# Patient Record
Sex: FEMALE | Race: WHITE | NOT HISPANIC OR LATINO | Employment: FULL TIME | URBAN - METROPOLITAN AREA
[De-identification: names, ages, dates, MRNs, and addresses within clinical notes are randomized per-mention and may not be internally consistent; named-entity substitution may affect disease eponyms.]

---

## 2018-10-18 ENCOUNTER — OFFICE VISIT (OUTPATIENT)
Dept: URGENT CARE | Facility: CLINIC | Age: 68
End: 2018-10-18
Payer: MEDICARE

## 2018-10-18 ENCOUNTER — APPOINTMENT (OUTPATIENT)
Dept: RADIOLOGY | Facility: CLINIC | Age: 68
End: 2018-10-18
Payer: MEDICARE

## 2018-10-18 VITALS
WEIGHT: 125 LBS | HEIGHT: 63 IN | RESPIRATION RATE: 20 BRPM | BODY MASS INDEX: 22.15 KG/M2 | HEART RATE: 74 BPM | SYSTOLIC BLOOD PRESSURE: 120 MMHG | DIASTOLIC BLOOD PRESSURE: 70 MMHG | TEMPERATURE: 98.7 F | OXYGEN SATURATION: 98 %

## 2018-10-18 DIAGNOSIS — R07.81 RIB PAIN: ICD-10-CM

## 2018-10-18 DIAGNOSIS — R07.81 RIB PAIN: Primary | ICD-10-CM

## 2018-10-18 PROCEDURE — 99203 OFFICE O/P NEW LOW 30 MIN: CPT | Performed by: PHYSICIAN ASSISTANT

## 2018-10-18 PROCEDURE — G0463 HOSPITAL OUTPT CLINIC VISIT: HCPCS | Performed by: PHYSICIAN ASSISTANT

## 2018-10-18 PROCEDURE — 71101 X-RAY EXAM UNILAT RIBS/CHEST: CPT

## 2018-10-18 RX ORDER — IBUPROFEN 600 MG/1
600 TABLET ORAL EVERY 8 HOURS SCHEDULED
Qty: 21 TABLET | Refills: 0 | Status: SHIPPED | OUTPATIENT
Start: 2018-10-18 | End: 2019-07-02

## 2018-10-18 RX ORDER — MULTIVITAMIN
TABLET ORAL DAILY
COMMUNITY

## 2018-10-18 NOTE — PROGRESS NOTES
[address] [facility]        NAME: Gianfranco Silvestre is a 77 y o  female  : 1950    MRN: [id number]  DATE: 2018  TIME: 11:05 AM    Assessment and Plan   Rib pain [R07 81]  1  Rib pain  XR ribs right w pa chest min 3 views    ibuprofen (MOTRIN) 600 mg tablet         Patient Instructions     Xray- no rib fracture  Ibuprofen OTC, take three tablets three times a day for 5-7 days  Ice 20min every 3-4 hours  Follow up with PCP in 3-5 days  Proceed to  ER if symptoms worsen  Chief Complaint     Chief Complaint   Patient presents with    Fall     fell on step yesterday and hurt thoracic area of back         History of Present Illness       77 y o  Female presents with right sided rib pain x 1 day  Pt states it is painful to take a deep breath in  She fell on her back deck down a step landing on her back  Did not hit her head  Denies loss of consciousness  Denies chest pain  Or SOB  Review of Systems   Review of Systems   Constitutional: Negative for chills, fatigue and fever  HENT: Negative for congestion, ear pain, sinus pain, sore throat and trouble swallowing  Eyes: Negative for pain, discharge and redness  Respiratory: Negative for cough, chest tightness, shortness of breath and wheezing  Cardiovascular: Negative for chest pain, palpitations and leg swelling  Gastrointestinal: Negative for abdominal pain, diarrhea, nausea and vomiting  Musculoskeletal: Negative for arthralgias, joint swelling and myalgias  Rib pain   Skin: Negative for rash  Neurological: Negative for dizziness, weakness, numbness and headaches           Current Medications       Current Outpatient Prescriptions:     ibuprofen (MOTRIN) 600 mg tablet, Take 1 tablet (600 mg total) by mouth every 8 (eight) hours for 7 days, Disp: 21 tablet, Rfl: 0    Multiple Vitamin (MULTI-VITAMIN DAILY) TABS, Take by mouth daily, Disp: , Rfl:     Current Allergies     Allergies as of 10/18/2018    (No Known Allergies)            The following portions of the patient's history were reviewed and updated as appropriate: allergies, current medications, past family history, past medical history, past social history, past surgical history and problem list      History reviewed  No pertinent past medical history  History reviewed  No pertinent surgical history  No family history on file  Medications have been verified  Objective   /70   Pulse 74   Temp 98 7 °F (37 1 °C) (Tympanic)   Resp 20   Ht 5' 3" (1 6 m)   Wt 56 7 kg (125 lb)   SpO2 98%   BMI 22 14 kg/m²        Physical Exam     Physical Exam   Constitutional: She is oriented to person, place, and time  She appears well-developed and well-nourished  No distress  HENT:   Head: Normocephalic  Right Ear: External ear normal    Left Ear: External ear normal    Mouth/Throat: Oropharynx is clear and moist    Eyes: Pupils are equal, round, and reactive to light  Conjunctivae and EOM are normal    Neck: Normal range of motion  Neck supple  Cardiovascular: Normal rate, regular rhythm and normal heart sounds  No murmur heard  Pulmonary/Chest: Effort normal and breath sounds normal  No respiratory distress  She has no wheezes  Abdominal: Soft  Bowel sounds are normal  There is no tenderness  Musculoskeletal: Normal range of motion  Cervical back: Normal         Thoracic back: Normal         Lumbar back: Normal         Arms:  Lymphadenopathy:     She has no cervical adenopathy  Neurological: She is alert and oriented to person, place, and time  She has normal reflexes  Skin: Skin is warm and dry  Psychiatric: She has a normal mood and affect  Nursing note and vitals reviewed          Xray- no rib fracture or pneumothorax

## 2018-12-27 ENCOUNTER — OFFICE VISIT (OUTPATIENT)
Dept: URGENT CARE | Facility: CLINIC | Age: 68
End: 2018-12-27
Payer: MEDICARE

## 2018-12-27 VITALS
HEART RATE: 73 BPM | WEIGHT: 123 LBS | HEIGHT: 63 IN | SYSTOLIC BLOOD PRESSURE: 116 MMHG | OXYGEN SATURATION: 95 % | BODY MASS INDEX: 21.79 KG/M2 | RESPIRATION RATE: 20 BRPM | DIASTOLIC BLOOD PRESSURE: 70 MMHG | TEMPERATURE: 97.3 F

## 2018-12-27 DIAGNOSIS — J01.10 ACUTE FRONTAL SINUSITIS, RECURRENCE NOT SPECIFIED: Primary | ICD-10-CM

## 2018-12-27 PROCEDURE — G0463 HOSPITAL OUTPT CLINIC VISIT: HCPCS | Performed by: NURSE PRACTITIONER

## 2018-12-27 PROCEDURE — 99213 OFFICE O/P EST LOW 20 MIN: CPT | Performed by: NURSE PRACTITIONER

## 2018-12-27 RX ORDER — AMOXICILLIN 875 MG/1
875 TABLET, COATED ORAL 2 TIMES DAILY
Qty: 20 TABLET | Refills: 0 | Status: SHIPPED | OUTPATIENT
Start: 2018-12-27 | End: 2019-01-06

## 2018-12-27 NOTE — PROGRESS NOTES
[facility]        NAME: Sander Luis is a 77 y o  female  : 1950    MRN: [id number]  DATE: 2018  TIME: 5:24 PM    Assessment and Plan   Acute frontal sinusitis, recurrence not specified [J01 10]  1  Acute frontal sinusitis, recurrence not specified  amoxicillin (AMOXIL) 875 mg tablet         Patient Instructions     I have prescribed an antibiotic for the infection  Please take the antibiotic as prescribed and finish the entire prescription  I recommend that the patient takes an over the counter probiotic or eats yogurt with live cultures in it [place]) to keep good bacteria in the gut and help prevent diarrhea  Wash hands frequently to prevent the spread of infection  Can use over the counter cough and cold medications to help with symptoms  Ibuprofen and/or tylenol as needed for pain or fever  If not improving over the next 7-10 days, follow up with PCP  Follow up with PCP in 3-5 days  Proceed to  ER if symptoms worsen  Chief Complaint     Chief Complaint   Patient presents with    Swollen Glands     swollen gland near right ear with cough for 3 days         History of Present Illness       61-year-old female presents to urgent care with chief complaint of sinus pressure, sinus congestion, postnasal drainage and dry nonproductive cough which is worse at night for the past 3 days  Has used over-the-counter decongestants no relief noted in symptoms denies any fevers or chills  URI    This is a new problem  The current episode started in the past 7 days  The problem has been unchanged  There has been no fever  Associated symptoms include congestion, coughing, a plugged ear sensation, rhinorrhea and sinus pain  Pertinent negatives include no abdominal pain, chest pain, diarrhea, dysuria, ear pain, headaches, joint pain, joint swelling, nausea, neck pain, rash, sneezing, sore throat, swollen glands, vomiting or wheezing   She has tried decongestant for the symptoms  The treatment provided mild relief  Review of Systems   Review of Systems   Constitutional: Negative  HENT: Positive for congestion, postnasal drip, rhinorrhea, sinus pain and sinus pressure  Negative for dental problem, drooling, ear discharge, ear pain, facial swelling, hearing loss, mouth sores, nosebleeds, sneezing, sore throat, tinnitus, trouble swallowing and voice change  Eyes: Negative  Respiratory: Positive for cough  Negative for apnea, choking, chest tightness, shortness of breath, wheezing and stridor  Cardiovascular: Negative for chest pain, palpitations and leg swelling  Gastrointestinal: Negative  Negative for abdominal distention, abdominal pain, anal bleeding, blood in stool, constipation, diarrhea, nausea, rectal pain and vomiting  Endocrine: Negative  Genitourinary: Negative  Negative for dysuria  Musculoskeletal: Negative  Negative for joint pain and neck pain  Skin: Negative  Negative for rash  Allergic/Immunologic: Negative  Neurological: Negative  Negative for headaches  Hematological: Negative  Psychiatric/Behavioral: Negative  Current Medications       Current Outpatient Prescriptions:     Multiple Vitamin (MULTI-VITAMIN DAILY) TABS, Take by mouth daily, Disp: , Rfl:     amoxicillin (AMOXIL) 875 mg tablet, Take 1 tablet (875 mg total) by mouth 2 (two) times a day for 10 days, Disp: 20 tablet, Rfl: 0    ibuprofen (MOTRIN) 600 mg tablet, Take 1 tablet (600 mg total) by mouth every 8 (eight) hours for 7 days, Disp: 21 tablet, Rfl: 0    Current Allergies     Allergies as of 12/27/2018    (No Known Allergies)            The following portions of the patient's history were reviewed and updated as appropriate: allergies, current medications, past family history, past medical history, past social history, past surgical history and problem list      History reviewed  No pertinent past medical history  History reviewed   No pertinent surgical history  History reviewed  No pertinent family history  Medications have been verified  Objective   /70   Pulse 73   Temp (!) 97 3 °F (36 3 °C) (Tympanic)   Resp 20   Ht 5' 3" (1 6 m)   Wt 55 8 kg (123 lb)   SpO2 95%   BMI 21 79 kg/m²        Physical Exam     Physical Exam   Constitutional: She is oriented to person, place, and time  Vital signs are normal  She appears well-developed and well-nourished  HENT:   Head: Normocephalic and atraumatic  Right Ear: Hearing, tympanic membrane, external ear and ear canal normal    Left Ear: Hearing, tympanic membrane, external ear and ear canal normal    Nose: Rhinorrhea and sinus tenderness present  Right sinus exhibits frontal sinus tenderness  Left sinus exhibits frontal sinus tenderness  Mouth/Throat: Uvula is midline and mucous membranes are normal  Posterior oropharyngeal erythema present  Eyes: Pupils are equal, round, and reactive to light  Conjunctivae and EOM are normal    Neck: Trachea normal, normal range of motion and full passive range of motion without pain  Cardiovascular: Normal rate and regular rhythm  Pulmonary/Chest: Effort normal and breath sounds normal    Abdominal: Soft  Normal appearance  Musculoskeletal: Normal range of motion  Lymphadenopathy:     She has cervical adenopathy  Right cervical: Superficial cervical adenopathy present  Left cervical: Superficial cervical adenopathy present  Neurological: She is alert and oriented to person, place, and time  Nursing note and vitals reviewed

## 2019-07-02 ENCOUNTER — HOSPITAL ENCOUNTER (EMERGENCY)
Facility: HOSPITAL | Age: 69
Discharge: HOME | End: 2019-07-02
Attending: FAMILY MEDICINE | Admitting: FAMILY MEDICINE
Payer: MEDICARE

## 2019-07-02 VITALS
RESPIRATION RATE: 28 BRPM | WEIGHT: 123.02 LBS | DIASTOLIC BLOOD PRESSURE: 65 MMHG | HEART RATE: 73 BPM | BODY MASS INDEX: 21.79 KG/M2 | TEMPERATURE: 96.1 F | OXYGEN SATURATION: 96 % | SYSTOLIC BLOOD PRESSURE: 135 MMHG

## 2019-07-02 DIAGNOSIS — T78.40XA ALLERGIC REACTION, INITIAL ENCOUNTER: Primary | ICD-10-CM

## 2019-07-02 PROCEDURE — 99285 EMERGENCY DEPT VISIT HI MDM: CPT

## 2019-07-02 PROCEDURE — 96374 THER/PROPH/DIAG INJ IV PUSH: CPT

## 2019-07-02 PROCEDURE — 94760 N-INVAS EAR/PLS OXIMETRY 1: CPT

## 2019-07-02 PROCEDURE — 93005 ELECTROCARDIOGRAM TRACING: CPT

## 2019-07-02 PROCEDURE — 94640 AIRWAY INHALATION TREATMENT: CPT

## 2019-07-02 PROCEDURE — 96375 TX/PRO/DX INJ NEW DRUG ADDON: CPT

## 2019-07-02 PROCEDURE — 96361 HYDRATE IV INFUSION ADD-ON: CPT

## 2019-07-02 RX ORDER — METHYLPREDNISOLONE SODIUM SUCCINATE 125 MG/2ML
125 INJECTION, POWDER, LYOPHILIZED, FOR SOLUTION INTRAMUSCULAR; INTRAVENOUS ONCE
Status: COMPLETED | OUTPATIENT
Start: 2019-07-02 | End: 2019-07-02

## 2019-07-02 RX ORDER — ALBUTEROL SULFATE 2.5 MG/3ML
2.5 SOLUTION RESPIRATORY (INHALATION) ONCE
Status: COMPLETED | OUTPATIENT
Start: 2019-07-02 | End: 2019-07-02

## 2019-07-02 RX ORDER — DIPHENHYDRAMINE HYDROCHLORIDE 50 MG/ML
25 INJECTION INTRAMUSCULAR; INTRAVENOUS ONCE
Status: COMPLETED | OUTPATIENT
Start: 2019-07-02 | End: 2019-07-02

## 2019-07-02 RX ORDER — PREDNISONE 20 MG/1
20 TABLET ORAL DAILY
Qty: 5 TABLET | Refills: 0 | Status: SHIPPED | OUTPATIENT
Start: 2019-07-02 | End: 2019-07-07

## 2019-07-02 RX ORDER — DIPHENHYDRAMINE HCL 25 MG
25 TABLET ORAL AS NEEDED
COMMUNITY

## 2019-07-02 RX ADMIN — SODIUM CHLORIDE 1000 ML: 0.9 INJECTION, SOLUTION INTRAVENOUS at 21:29

## 2019-07-02 RX ADMIN — METHYLPREDNISOLONE SODIUM SUCCINATE 125 MG: 125 INJECTION, POWDER, FOR SOLUTION INTRAMUSCULAR; INTRAVENOUS at 21:34

## 2019-07-02 RX ADMIN — ALBUTEROL SULFATE 2.5 MG: 2.5 SOLUTION RESPIRATORY (INHALATION) at 21:32

## 2019-07-02 RX ADMIN — DIPHENHYDRAMINE HYDROCHLORIDE 25 MG: 50 INJECTION INTRAMUSCULAR; INTRAVENOUS at 21:31

## 2019-07-03 LAB
ATRIAL RATE: 57 BPM
P AXIS: 76 DEGREES
PR INTERVAL: 150 MS
QRS AXIS: 73 DEGREES
QRSD INTERVAL: 90 MS
QT INTERVAL: 482 MS
QTC INTERVAL: 469 MS
T WAVE AXIS: 67 DEGREES
VENTRICULAR RATE: 57 BPM

## 2019-07-03 PROCEDURE — 93010 ELECTROCARDIOGRAM REPORT: CPT | Performed by: INTERNAL MEDICINE

## 2019-07-03 NOTE — ED PROVIDER NOTES
History  Chief Complaint   Patient presents with    Chest Pain    Allergic Reaction    Dizziness       History provided by:  Patient   used: No    Allergic Reaction   Presenting symptoms: itching, rash and wheezing    Itching:     Location:  Full body    Severity:  Moderate    Onset quality:  Sudden    Duration:  1 hour    Timing:  Constant    Progression:  Improving  Severity:  Mild  Duration:  1 hour  Prior allergic episodes:  No prior episodes  Context: insect bite/sting    Relieved by: Antihistamines  Worsened by:  Nothing  Ineffective treatments:  Antihistamines      Prior to Admission Medications   Prescriptions Last Dose Informant Patient Reported? Taking? Multiple Vitamin (MULTI-VITAMIN DAILY) TABS 7/2/2019 at Unknown time  Yes Yes   Sig: Take by mouth daily   diphenhydrAMINE (BENADRYL) 25 mg tablet  Self Yes Yes   Sig: Take 25 mg by mouth every 6 (six) hours as needed for itching      Facility-Administered Medications: None       History reviewed  No pertinent past medical history  History reviewed  No pertinent surgical history  History reviewed  No pertinent family history  I have reviewed and agree with the history as documented  Social History     Tobacco Use    Smoking status: Current Every Day Smoker     Packs/day: 0 50     Types: Cigarettes    Smokeless tobacco: Never Used   Substance Use Topics    Alcohol use: No    Drug use: No        Review of Systems   Constitutional: Negative  HENT: Negative  Respiratory: Positive for chest tightness and wheezing  Cardiovascular: Negative  Gastrointestinal: Negative  Genitourinary: Negative  Musculoskeletal: Negative  Skin: Positive for itching and rash  Pallor: Hives  Physical Exam  Physical Exam   Constitutional: She appears well-developed and well-nourished  HENT:   Head: Normocephalic and atraumatic  Eyes: Pupils are equal, round, and reactive to light   EOM are normal    Neck: Normal range of motion  Neck supple  Cardiovascular: Normal rate, regular rhythm and normal heart sounds  Pulmonary/Chest: She has wheezes  Abdominal: Soft  Bowel sounds are normal    Skin: Rash (There is diffuse urticaria rash present on the entire body which is improving since patient has been the ED  Rash is consistent with allergic reaction secondary to bee sting ) noted  Psychiatric: She has a normal mood and affect  Nursing note and vitals reviewed  Vital Signs  ED Triage Vitals [07/02/19 2111]   Temperature Pulse Respirations Blood Pressure SpO2   (!) 96 1 °F (35 6 °C) 65 16 90/54 93 %      Temp Source Heart Rate Source Patient Position - Orthostatic VS BP Location FiO2 (%)   Temporal Monitor -- -- --      Pain Score       No Pain           Vitals:    07/02/19 2111   BP: 90/54   Pulse: 65         Visual Acuity      ED Medications  Medications   famotidine (PEPCID) injection 20 mg (has no administration in time range)   methylPREDNISolone sodium succinate (Solu-MEDROL) injection 125 mg (125 mg Intravenous Given 7/2/19 2134)   diphenhydrAMINE (BENADRYL) injection 25 mg (25 mg Intravenous Given 7/2/19 2131)   albuterol inhalation solution 2 5 mg (2 5 mg Nebulization Given 7/2/19 2132)   sodium chloride 0 9 % bolus 1,000 mL (0 mL Intravenous Stopped 7/2/19 2224)       Diagnostic Studies  Results Reviewed     None                 No orders to display              Procedures  Procedures       ED Course  ED Course as of Jul 02 2226   Tue Jul 02, 2019   2157 Patient states she is feeling much better lung exam has improved lungs clear to auscultation  MDM    Disposition  Final diagnoses:    Allergic reaction, initial encounter     Time reflects when diagnosis was documented in both MDM as applicable and the Disposition within this note     Time User Action Codes Description Comment    7/2/2019  9:29 PM Jonas Aly Add [T78 40XA] Allergic reaction, initial encounter       ED Disposition     ED Disposition Condition Date/Time Comment    Discharge Stable Tue Jul 2, 2019 10:25 PM Vy Pichardo discharge to home/self care  Follow-up Information     Follow up With Specialties Details Why Contact Info    Nuria Paredes MD Internal Medicine, Emergency Medicine Schedule an appointment as soon as possible for a visit in 2 days If symptoms worsen [address] [place] [address] [phone]            Patient's Medications   Discharge Prescriptions    PREDNISONE 20 MG TABLET    Take 1 tablet (20 mg total) by mouth daily for 5 days       Start Date: 7/2/2019  End Date: 7/7/2019       Order Dose: 20 mg       Quantity: 5 tablet    Refills: 0     No discharge procedures on file      ED Provider  Electronically Signed by           Raheem Kumar MD  07/02/19 1028

## 2019-07-03 NOTE — ED NOTES
Patient was stung by a bee 2x at 78  Patient developed hives on her back, trunk, and arms  While in the waiting room in the ED the patient developed chest pain/discomfort, dizziness, and weakness        Estela Herndon RN  07/02/19 9583

## 2020-02-03 ENCOUNTER — APPOINTMENT (OUTPATIENT)
Dept: LAB | Facility: CLINIC | Age: 70
End: 2020-02-03
Payer: MEDICARE

## 2020-02-03 ENCOUNTER — APPOINTMENT (OUTPATIENT)
Dept: RADIOLOGY | Facility: CLINIC | Age: 70
End: 2020-02-03
Payer: MEDICARE

## 2020-02-03 ENCOUNTER — TRANSCRIBE ORDERS (OUTPATIENT)
Dept: LAB | Facility: CLINIC | Age: 70
End: 2020-02-03

## 2020-02-03 DIAGNOSIS — R05.9 COUGH: ICD-10-CM

## 2020-02-03 DIAGNOSIS — Z79.899 ENCOUNTER FOR LONG-TERM (CURRENT) USE OF OTHER MEDICATIONS: ICD-10-CM

## 2020-02-03 DIAGNOSIS — R73.9 HYPERGLYCEMIA: ICD-10-CM

## 2020-02-03 DIAGNOSIS — F17.200 SMOKER: ICD-10-CM

## 2020-02-03 DIAGNOSIS — E78.5 HYPERLIPIDEMIA, UNSPECIFIED HYPERLIPIDEMIA TYPE: Primary | ICD-10-CM

## 2020-02-03 DIAGNOSIS — E55.9 VITAMIN D DEFICIENCY: ICD-10-CM

## 2020-02-03 DIAGNOSIS — Z11.59 NEED FOR HEPATITIS C SCREENING TEST: ICD-10-CM

## 2020-02-03 DIAGNOSIS — R53.83 FATIGUE, UNSPECIFIED TYPE: ICD-10-CM

## 2020-02-03 LAB
25(OH)D3 SERPL-MCNC: 21.8 NG/ML (ref 30–100)
ALBUMIN SERPL BCP-MCNC: 4 G/DL (ref 3.5–5)
ALP SERPL-CCNC: 104 U/L (ref 46–116)
ALT SERPL W P-5'-P-CCNC: 21 U/L (ref 12–78)
ANION GAP SERPL CALCULATED.3IONS-SCNC: 3 MMOL/L (ref 4–13)
AST SERPL W P-5'-P-CCNC: 12 U/L (ref 5–45)
BILIRUB SERPL-MCNC: 0.3 MG/DL (ref 0.2–1)
BUN SERPL-MCNC: 16 MG/DL (ref 5–25)
CALCIUM SERPL-MCNC: 9.6 MG/DL (ref 8.3–10.1)
CHLORIDE SERPL-SCNC: 106 MMOL/L (ref 100–108)
CHOLEST SERPL-MCNC: 191 MG/DL (ref 50–200)
CO2 SERPL-SCNC: 27 MMOL/L (ref 21–32)
CREAT SERPL-MCNC: 0.77 MG/DL (ref 0.6–1.3)
ERYTHROCYTE [DISTWIDTH] IN BLOOD BY AUTOMATED COUNT: 14 % (ref 11.6–15.1)
EST. AVERAGE GLUCOSE BLD GHB EST-MCNC: 120 MG/DL
GFR SERPL CREATININE-BSD FRML MDRD: 78 ML/MIN/1.73SQ M
GLUCOSE P FAST SERPL-MCNC: 84 MG/DL (ref 65–99)
HBA1C MFR BLD: 5.8 % (ref 4.2–6.3)
HBV SURFACE AG SER QL: NORMAL
HCT VFR BLD AUTO: 45 % (ref 34.8–46.1)
HDLC SERPL-MCNC: 76 MG/DL
HGB BLD-MCNC: 14.6 G/DL (ref 11.5–15.4)
LDLC SERPL CALC-MCNC: 100 MG/DL (ref 0–100)
MCH RBC QN AUTO: 30 PG (ref 26.8–34.3)
MCHC RBC AUTO-ENTMCNC: 32.4 G/DL (ref 31.4–37.4)
MCV RBC AUTO: 93 FL (ref 82–98)
NONHDLC SERPL-MCNC: 115 MG/DL
PLATELET # BLD AUTO: 216 THOUSANDS/UL (ref 149–390)
PMV BLD AUTO: 9.9 FL (ref 8.9–12.7)
POTASSIUM SERPL-SCNC: 4.1 MMOL/L (ref 3.5–5.3)
PROT SERPL-MCNC: 7.8 G/DL (ref 6.4–8.2)
RBC # BLD AUTO: 4.86 MILLION/UL (ref 3.81–5.12)
SODIUM SERPL-SCNC: 136 MMOL/L (ref 136–145)
T4 FREE SERPL-MCNC: 1.12 NG/DL (ref 0.76–1.46)
TRIGL SERPL-MCNC: 76 MG/DL
TSH SERPL DL<=0.05 MIU/L-ACNC: 2.14 UIU/ML (ref 0.36–3.74)
WBC # BLD AUTO: 8 THOUSAND/UL (ref 4.31–10.16)

## 2020-02-03 PROCEDURE — 85027 COMPLETE CBC AUTOMATED: CPT

## 2020-02-03 PROCEDURE — 36415 COLL VENOUS BLD VENIPUNCTURE: CPT

## 2020-02-03 PROCEDURE — 87340 HEPATITIS B SURFACE AG IA: CPT

## 2020-02-03 PROCEDURE — 84482 T3 REVERSE: CPT

## 2020-02-03 PROCEDURE — 80053 COMPREHEN METABOLIC PANEL: CPT

## 2020-02-03 PROCEDURE — 82306 VITAMIN D 25 HYDROXY: CPT

## 2020-02-03 PROCEDURE — 83036 HEMOGLOBIN GLYCOSYLATED A1C: CPT

## 2020-02-03 PROCEDURE — 71046 X-RAY EXAM CHEST 2 VIEWS: CPT

## 2020-02-03 PROCEDURE — 84443 ASSAY THYROID STIM HORMONE: CPT

## 2020-02-03 PROCEDURE — 84439 ASSAY OF FREE THYROXINE: CPT

## 2020-02-03 PROCEDURE — 80061 LIPID PANEL: CPT

## 2020-02-06 LAB — T3REVERSE SERPL-MCNC: 24.9 NG/DL (ref 9.2–24.1)

## 2020-07-23 ENCOUNTER — APPOINTMENT (OUTPATIENT)
Dept: LAB | Facility: CLINIC | Age: 70
End: 2020-07-23
Payer: MEDICARE

## 2020-07-23 ENCOUNTER — TRANSCRIBE ORDERS (OUTPATIENT)
Dept: LAB | Facility: CLINIC | Age: 70
End: 2020-07-23

## 2020-07-23 DIAGNOSIS — E55.9 VITAMIN D DEFICIENCY: Primary | ICD-10-CM

## 2020-07-23 LAB — 25(OH)D3 SERPL-MCNC: 33.1 NG/ML (ref 30–100)

## 2020-07-23 PROCEDURE — 82306 VITAMIN D 25 HYDROXY: CPT

## 2020-07-23 PROCEDURE — 36415 COLL VENOUS BLD VENIPUNCTURE: CPT

## 2020-09-24 ENCOUNTER — TRANSCRIBE ORDERS (OUTPATIENT)
Dept: ADMINISTRATIVE | Facility: HOSPITAL | Age: 70
End: 2020-09-24

## 2020-09-24 DIAGNOSIS — H53.9 VISUAL DISTURBANCE: ICD-10-CM

## 2020-09-24 DIAGNOSIS — R42 LIGHT HEADEDNESS: Primary | ICD-10-CM

## 2020-09-24 DIAGNOSIS — H53.123 TRANSIENT VISUAL LOSS, BILATERAL: ICD-10-CM

## 2020-09-24 DIAGNOSIS — H53.8 BLURRED VISION: ICD-10-CM

## 2020-09-25 ENCOUNTER — HOSPITAL ENCOUNTER (OUTPATIENT)
Dept: CT IMAGING | Facility: HOSPITAL | Age: 70
Discharge: HOME | End: 2020-09-25
Attending: INTERNAL MEDICINE
Payer: MEDICARE

## 2020-09-25 DIAGNOSIS — H53.8 BLURRED VISION: ICD-10-CM

## 2020-09-25 DIAGNOSIS — R42 LIGHT HEADEDNESS: ICD-10-CM

## 2020-09-25 PROCEDURE — 70450 CT HEAD/BRAIN W/O DYE: CPT

## 2020-09-25 PROCEDURE — G1004 CDSM NDSC: HCPCS

## 2020-09-30 ENCOUNTER — HOSPITAL ENCOUNTER (OUTPATIENT)
Dept: NON INVASIVE DIAGNOSTICS | Facility: HOSPITAL | Age: 70
Discharge: HOME | End: 2020-09-30
Attending: INTERNAL MEDICINE
Payer: MEDICARE

## 2020-09-30 DIAGNOSIS — R42 LIGHT HEADEDNESS: ICD-10-CM

## 2020-09-30 DIAGNOSIS — H53.8 BLURRED VISION: ICD-10-CM

## 2020-09-30 DIAGNOSIS — H53.123 TRANSIENT VISUAL LOSS, BILATERAL: ICD-10-CM

## 2020-09-30 PROCEDURE — 93880 EXTRACRANIAL BILAT STUDY: CPT | Performed by: SURGERY

## 2020-09-30 PROCEDURE — 93880 EXTRACRANIAL BILAT STUDY: CPT

## 2020-10-01 ENCOUNTER — LAB (OUTPATIENT)
Dept: LAB | Facility: CLINIC | Age: 70
End: 2020-10-01
Payer: MEDICARE

## 2020-10-01 ENCOUNTER — TRANSCRIBE ORDERS (OUTPATIENT)
Dept: URGENT CARE | Facility: CLINIC | Age: 70
End: 2020-10-01

## 2020-10-01 DIAGNOSIS — R53.83 FATIGUE, UNSPECIFIED TYPE: ICD-10-CM

## 2020-10-01 DIAGNOSIS — H53.8 BLURRED VISION: ICD-10-CM

## 2020-10-01 DIAGNOSIS — Z79.899 ENCOUNTER FOR LONG-TERM (CURRENT) USE OF OTHER MEDICATIONS: Primary | ICD-10-CM

## 2020-10-01 DIAGNOSIS — E03.9 HYPOTHYROIDISM, UNSPECIFIED TYPE: ICD-10-CM

## 2020-10-01 LAB
ALBUMIN SERPL BCP-MCNC: 3.8 G/DL (ref 3.5–5)
ALP SERPL-CCNC: 103 U/L (ref 46–116)
ALT SERPL W P-5'-P-CCNC: 18 U/L (ref 12–78)
ANION GAP SERPL CALCULATED.3IONS-SCNC: 2 MMOL/L (ref 4–13)
AST SERPL W P-5'-P-CCNC: 11 U/L (ref 5–45)
BILIRUB SERPL-MCNC: 0.3 MG/DL (ref 0.2–1)
BUN SERPL-MCNC: 15 MG/DL (ref 5–25)
CALCIUM SERPL-MCNC: 9.5 MG/DL (ref 8.3–10.1)
CHLORIDE SERPL-SCNC: 107 MMOL/L (ref 100–108)
CO2 SERPL-SCNC: 29 MMOL/L (ref 21–32)
CREAT SERPL-MCNC: 0.8 MG/DL (ref 0.6–1.3)
ERYTHROCYTE [DISTWIDTH] IN BLOOD BY AUTOMATED COUNT: 14.2 % (ref 11.6–15.1)
EST. AVERAGE GLUCOSE BLD GHB EST-MCNC: 111 MG/DL
GFR SERPL CREATININE-BSD FRML MDRD: 74 ML/MIN/1.73SQ M
GLUCOSE SERPL-MCNC: 82 MG/DL (ref 65–140)
HBA1C MFR BLD: 5.5 %
HCT VFR BLD AUTO: 45.1 % (ref 34.8–46.1)
HGB BLD-MCNC: 14.3 G/DL (ref 11.5–15.4)
MCH RBC QN AUTO: 29.8 PG (ref 26.8–34.3)
MCHC RBC AUTO-ENTMCNC: 31.7 G/DL (ref 31.4–37.4)
MCV RBC AUTO: 94 FL (ref 82–98)
PLATELET # BLD AUTO: 253 THOUSANDS/UL (ref 149–390)
PMV BLD AUTO: 9.7 FL (ref 8.9–12.7)
POTASSIUM SERPL-SCNC: 4.2 MMOL/L (ref 3.5–5.3)
PROT SERPL-MCNC: 7.5 G/DL (ref 6.4–8.2)
RBC # BLD AUTO: 4.8 MILLION/UL (ref 3.81–5.12)
SODIUM SERPL-SCNC: 138 MMOL/L (ref 136–145)
T4 FREE SERPL-MCNC: 1.06 NG/DL (ref 0.76–1.46)
TSH SERPL DL<=0.05 MIU/L-ACNC: 1.46 UIU/ML (ref 0.36–3.74)
WBC # BLD AUTO: 7.06 THOUSAND/UL (ref 4.31–10.16)

## 2020-10-01 PROCEDURE — 86618 LYME DISEASE ANTIBODY: CPT

## 2020-10-01 PROCEDURE — 84439 ASSAY OF FREE THYROXINE: CPT

## 2020-10-01 PROCEDURE — 84443 ASSAY THYROID STIM HORMONE: CPT

## 2020-10-01 PROCEDURE — 80053 COMPREHEN METABOLIC PANEL: CPT

## 2020-10-01 PROCEDURE — 85027 COMPLETE CBC AUTOMATED: CPT

## 2020-10-01 PROCEDURE — 36415 COLL VENOUS BLD VENIPUNCTURE: CPT

## 2020-10-01 PROCEDURE — 83036 HEMOGLOBIN GLYCOSYLATED A1C: CPT

## 2020-10-02 LAB — B BURGDOR IGG+IGM SER-ACNC: <0.91 ISR (ref 0–0.9)

## 2020-10-27 ENCOUNTER — HOSPITAL ENCOUNTER (OUTPATIENT)
Dept: MRI IMAGING | Facility: HOSPITAL | Age: 70
Discharge: HOME | End: 2020-10-27
Attending: INTERNAL MEDICINE
Payer: MEDICARE

## 2020-10-27 DIAGNOSIS — H53.9 VISUAL DISTURBANCE: ICD-10-CM

## 2020-10-27 DIAGNOSIS — H53.8 BLURRED VISION: ICD-10-CM

## 2020-10-27 PROCEDURE — 70553 MRI BRAIN STEM W/O & W/DYE: CPT

## 2020-10-27 PROCEDURE — A9577 INJ MULTIHANCE: HCPCS | Performed by: INTERNAL MEDICINE

## 2020-10-27 PROCEDURE — G1004 CDSM NDSC: HCPCS

## 2020-10-27 RX ADMIN — GADOBENATE DIMEGLUMINE 6 ML: 529 INJECTION, SOLUTION INTRAVENOUS at 11:08

## 2020-11-03 ENCOUNTER — TELEPHONE (OUTPATIENT)
Dept: NEUROLOGY | Facility: CLINIC | Age: 70
End: 2020-11-03

## 2021-01-25 ENCOUNTER — TRANSCRIBE ORDERS (OUTPATIENT)
Dept: ADMINISTRATIVE | Facility: HOSPITAL | Age: 71
End: 2021-01-25

## 2021-01-25 DIAGNOSIS — J44.9 COPD (CHRONIC OBSTRUCTIVE PULMONARY DISEASE) (HCC): Primary | ICD-10-CM

## 2021-02-08 ENCOUNTER — HOSPITAL ENCOUNTER (OUTPATIENT)
Dept: CT IMAGING | Facility: HOSPITAL | Age: 71
Discharge: HOME | End: 2021-02-08
Attending: INTERNAL MEDICINE
Payer: MEDICARE

## 2021-02-08 DIAGNOSIS — J44.9 COPD (CHRONIC OBSTRUCTIVE PULMONARY DISEASE) (HCC): ICD-10-CM

## 2021-02-08 PROCEDURE — 71271 CT THORAX LUNG CANCER SCR C-: CPT

## 2021-02-09 ENCOUNTER — LAB (OUTPATIENT)
Dept: LAB | Facility: CLINIC | Age: 71
End: 2021-02-09
Payer: MEDICARE

## 2021-02-09 ENCOUNTER — CONSULT (OUTPATIENT)
Dept: NEUROLOGY | Facility: CLINIC | Age: 71
End: 2021-02-09
Payer: MEDICARE

## 2021-02-09 ENCOUNTER — TRANSCRIBE ORDERS (OUTPATIENT)
Dept: LAB | Facility: CLINIC | Age: 71
End: 2021-02-09

## 2021-02-09 VITALS
HEIGHT: 63 IN | SYSTOLIC BLOOD PRESSURE: 120 MMHG | DIASTOLIC BLOOD PRESSURE: 60 MMHG | RESPIRATION RATE: 14 BRPM | WEIGHT: 121.4 LBS | BODY MASS INDEX: 21.51 KG/M2 | HEART RATE: 60 BPM

## 2021-02-09 DIAGNOSIS — G45.9 TIA (TRANSIENT ISCHEMIC ATTACK): ICD-10-CM

## 2021-02-09 DIAGNOSIS — H54.3 VISION LOSS, BILATERAL: ICD-10-CM

## 2021-02-09 DIAGNOSIS — I82.C13: ICD-10-CM

## 2021-02-09 DIAGNOSIS — H53.9 VISION DISTURBANCE: ICD-10-CM

## 2021-02-09 DIAGNOSIS — H54.3 VISION LOSS, BILATERAL: Primary | ICD-10-CM

## 2021-02-09 LAB
CRP SERPL QL: <3 MG/L
ERYTHROCYTE [SEDIMENTATION RATE] IN BLOOD: 16 MM/HOUR (ref 0–29)

## 2021-02-09 PROCEDURE — 85305 CLOT INHIBIT PROT S TOTAL: CPT

## 2021-02-09 PROCEDURE — 85705 THROMBOPLASTIN INHIBITION: CPT

## 2021-02-09 PROCEDURE — 85732 THROMBOPLASTIN TIME PARTIAL: CPT

## 2021-02-09 PROCEDURE — 85303 CLOT INHIBIT PROT C ACTIVITY: CPT

## 2021-02-09 PROCEDURE — 36415 COLL VENOUS BLD VENIPUNCTURE: CPT

## 2021-02-09 PROCEDURE — 86146 BETA-2 GLYCOPROTEIN ANTIBODY: CPT

## 2021-02-09 PROCEDURE — 86140 C-REACTIVE PROTEIN: CPT

## 2021-02-09 PROCEDURE — 99205 OFFICE O/P NEW HI 60 MIN: CPT | Performed by: PSYCHIATRY & NEUROLOGY

## 2021-02-09 PROCEDURE — 85613 RUSSELL VIPER VENOM DILUTED: CPT

## 2021-02-09 PROCEDURE — 85306 CLOT INHIBIT PROT S FREE: CPT

## 2021-02-09 PROCEDURE — 85670 THROMBIN TIME PLASMA: CPT

## 2021-02-09 PROCEDURE — 85652 RBC SED RATE AUTOMATED: CPT

## 2021-02-09 PROCEDURE — 86147 CARDIOLIPIN ANTIBODY EA IG: CPT

## 2021-02-09 PROCEDURE — 85300 ANTITHROMBIN III ACTIVITY: CPT

## 2021-02-09 RX ORDER — FLUTICASONE FUROATE, UMECLIDINIUM BROMIDE AND VILANTEROL TRIFENATATE 200; 62.5; 25 UG/1; UG/1; UG/1
POWDER RESPIRATORY (INHALATION)
COMMUNITY
Start: 2021-01-25 | End: 2022-07-05 | Stop reason: SDUPTHER

## 2021-02-09 RX ORDER — GLUCOSAMINE/MSM/CHONDROIT SULF 500-166.6
1 TABLET ORAL DAILY
COMMUNITY

## 2021-02-09 RX ORDER — ASPIRIN 81 MG/1
81 TABLET, CHEWABLE ORAL DAILY
COMMUNITY

## 2021-02-09 RX ORDER — ALBUTEROL SULFATE 90 UG/1
AEROSOL, METERED RESPIRATORY (INHALATION)
COMMUNITY

## 2021-02-09 NOTE — PROGRESS NOTES
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, bilateral ([address] )         Relevant Orders    Thrombosis Panel           Mrs Reed  Has presented to [facility] multiple [address] for evaluation of vision loss  Patient described her events to place in 2020, when she was watching TV she lost vision for 10- 15 minutes  Patient described no other focal neurological deficit and since that time she has not had any additional focal neurological deficit  Patient has been taking aspirin 81 mg since then, she has ophthalmology evaluation with known mild dry macular degeneration, with concern was brought for ischemic optic neuropathy  At this point patient concern for transient ischemic attack is very high, she has complete resolution of her symptoms with no other visual dysfunction reported  Patient brain MRI was completed and we personally reviewed her studies, with no intracranial pathology appreciated, no acute or chronic ischemic or hemorrhagic changes has been seen, no neoplasm or demyelination noted  Patient also completed carotid Doppler ultrasound with no significant pathology noted  Patient has no diabetes with  mg/dL,  As well as will control blood pressure at this point  Patient was advised to complete her workup by Neurology with having serological evaluation for ESR/CR /Thrombosis panel    Patient is to continue her current aspirin 81 mg daily and in circumstances patient develops focal neurological deficit, she may take up to 4 aspirins at once  Patient was advised to follow with primary care physician for 2DEcho/ Holter  Monitoring  Patient has never had colonoscopy with last mammogram was completed many years ago  We extensively discussed hypercoagulable state in the setting of underlying cancer, similar presentation to place with her son with DVT  Patient had nonfocal neurologic exam today, non concerning  Patient has intermittent dizziness likely due to visual dysfunction as well as age-related vestibular system malfunctioning,  No concerning  patient is to follow with [facility] on as-needed basis  Patient is to continue practicing safety measures during the novel coronovirus public health emergency  Subjective:    HPI    Mrs Lissette Rutherford is a 64 yo  Female who was referred to [facility] multiple [address] for evaluation of visual dysfunction  Consult for visual changes (eye aura) per pt opthalmologist, neck pain, balance issues, lightheaded and anxiety, no concerns  Patient had brief event of complete loss of vision in both eyes, no scotoma or peripheral vision dysfunction has been described at that time  Patient is so color white for 10-15 minutes with complete resolution of her symptoms reported  Patient described pain in her  Neck 1 week prior to vision loss, with carotid Doppler ultrasound completed and normal vertebral arteries appreciated bilaterally  Patient described no other focal neurological deficit since her vision loss in September 2020  MRI brain 10/27/2020: There is no discrete mass, mass effect or midline shift  There is no intracranial hemorrhage  Normal posterior fossa    Diffusion imaging is unremarkable        Small scattered hyperintensities on T2/FLAIR imaging are noted in the periventricular and subcortical white matter demonstrating an appearance that is statistically most likely to represent mild microangiopathic change      Postcontrast imaging of the brain demonstrates no abnormal enhancement  VAS carotid complete study: <50% stenosis b/l ICA  HbA1C 5 5; TSH 1 460        The following portions of the patient's history were reviewed and updated as appropriate:   She  has no past medical history on file  She   Patient Active Problem List    Diagnosis Date Noted    Vision disturbance 02/09/2021    Vision loss, bilateral 02/09/2021    TIA (transient ischemic attack) 02/09/2021     She  has no past surgical history on file  Her family history is not on file  She  reports that she has been smoking cigarettes  She has been smoking about 0 50 packs per day  She has never used smokeless tobacco  She reports that she does not drink alcohol or use drugs  Current Outpatient Medications   Medication Sig Dispense Refill    albuterol (PROVENTIL HFA,VENTOLIN HFA) 90 mcg/act inhaler albuterol sulfate HFA 90 mcg/actuation aerosol inhaler      Ascorbic Acid (Vitamin C) 125 MG CHEW Chew 1 tablet daily      aspirin 81 mg chewable tablet Chew 81 mg daily      BIOTIN PO Take 1 tablet by mouth daily      Calcium Carbonate-Vit D-Min (CALTRATE 600+D PLUS MINERALS PO) Take 1 tablet by mouth daily      Cetirizine HCl (ZYRTEC ALLERGY PO) Take 1 tablet by mouth as needed      Multiple Vitamin (MULTI-VITAMIN DAILY) TABS Take by mouth daily      Trelegy Ellipta 200-62 5-25 MCG/INH AEPB inhaler       VITAMIN D PO Take 1 tablet by mouth daily      Zinc Sulfate (ZINC-220 PO) Take 1 tablet by mouth daily      diphenhydrAMINE (BENADRYL) 25 mg tablet Take 25 mg by mouth as needed for itching        No current facility-administered medications for this visit        Current Outpatient Medications on File Prior to Visit   Medication Sig    Ascorbic Acid (Vitamin C) 125 MG CHEW Chew 1 tablet daily    aspirin 81 mg chewable tablet Chew 81 mg daily    BIOTIN PO Take 1 tablet by mouth daily    Calcium Carbonate-Vit D-Min (CALTRATE 600+D PLUS MINERALS PO) Take 1 tablet by mouth daily    Cetirizine HCl (ZYRTEC ALLERGY PO) Take 1 tablet by mouth as needed    Multiple Vitamin (MULTI-VITAMIN DAILY) TABS Take by mouth daily    VITAMIN D PO Take 1 tablet by mouth daily    Zinc Sulfate (ZINC-220 PO) Take 1 tablet by mouth daily    diphenhydrAMINE (BENADRYL) 25 mg tablet Take 25 mg by mouth as needed for itching      No current facility-administered medications on file prior to visit  She has No Known Allergies            Objective:    Blood pressure 120/60, pulse 60, resp  rate 14, height 5' 3" (1 6 m), weight 55 1 kg (121 lb 6 4 oz)  Physical Exam    Neurological Exam  CONSTITUTIONAL: NAD, pleasant  NECK: supple, no lymphadenopathy, no thyromegaly, no JVD  CARDIOVASCULAR: RRR, normal S1S2, no murmurs, no rubs  RESP: clear to auscultation bilaterally, no wheezes/rhonchi/rales  ABDOMEN: soft, non tender, non distended  SKIN: no rash or skin lesions  EXTREMITIES: no edema, pulses 2+bilaterally  PSYCH: appropriate mood and affect  NEUROLOGIC COMPREHENSIVE EXAM: Patient is oriented to person, place and time, NAD; appropriate affect  CN II, III, IV, V, VI, VII,VIII,IX,X,XI-XII intact with EOMI, PERRLA, OKN intact, VF grossly intact, fundi poorly visualized secondary to pupillary constriction; symmetric face noted  Motor: 5/5 UE/LE bilateral symmetric; Sensory: intact to light touch and pinprick bilaterally; normal vibration sensation feet bilaterally; Coordination within normal limits on FTN and KIMBERLEE testing; DTR: 2++/4 through, no Babinski, no clonus  Tandem gait is intact  Romberg: negative  ROS:  12 points of review of system was reviewed with the patient and was unremarkable with exception: see HPI  Review of Systems   Constitutional: Negative  Negative for appetite change and fever  HENT: Negative  Negative for hearing loss, tinnitus, trouble swallowing and voice change      Eyes: Positive for visual disturbance  Negative for photophobia and pain  Respiratory: Negative  Negative for shortness of breath  Cardiovascular: Negative  Negative for palpitations  Gastrointestinal: Negative  Negative for nausea and vomiting  Endocrine: Negative  Negative for cold intolerance  Genitourinary: Negative  Negative for dysuria, frequency and urgency  Musculoskeletal: Positive for gait problem and neck pain  Negative for myalgias  Skin: Negative  Negative for rash  Allergic/Immunologic: Negative  Neurological: Positive for light-headedness  Negative for dizziness, tremors, seizures, syncope, facial asymmetry, speech difficulty, weakness, numbness and headaches  Hematological: Negative  Does not bruise/bleed easily  Psychiatric/Behavioral: Negative  Negative for confusion, hallucinations and sleep disturbance          Anxiety

## 2021-02-10 DIAGNOSIS — H54.3 VISION LOSS, BILATERAL: ICD-10-CM

## 2021-02-10 DIAGNOSIS — R76.0 ANTI-CARDIOLIPIN ANTIBODY POSITIVE: Primary | ICD-10-CM

## 2021-02-10 LAB
CARDIOLIPIN IGA SER IA-ACNC: <9 APL U/ML (ref 0–11)
CARDIOLIPIN IGG SER IA-ACNC: 115 GPL U/ML (ref 0–14)
CARDIOLIPIN IGM SER IA-ACNC: <9 MPL U/ML (ref 0–12)
DEPRECATED AT III PPP: 95 % OF NORMAL (ref 92–136)

## 2021-02-11 ENCOUNTER — TELEPHONE (OUTPATIENT)
Dept: NEUROLOGY | Facility: CLINIC | Age: 71
End: 2021-02-11

## 2021-02-11 ENCOUNTER — TELEPHONE (OUTPATIENT)
Dept: SURGICAL ONCOLOGY | Facility: CLINIC | Age: 71
End: 2021-02-11

## 2021-02-11 LAB
APTT SCREEN TO CONFIRM RATIO: 1.06 RATIO (ref 0–1.4)
CONFIRM APTT/NORMAL: 40.2 SEC (ref 0–55)
LA PPP-IMP: NORMAL
PROT C AG ACT/NOR PPP IA: 144 % OF NORMAL (ref 60–150)
PROT S ACT/NOR PPP: 57 % (ref 57–157)
PROT S ACT/NOR PPP: 78 % (ref 68–108)
PROT S PPP-ACNC: 80 % (ref 60–150)
SCREEN APTT: 40.2 SEC (ref 0–51.9)
SCREEN DRVVT: 36.3 SEC (ref 0–47)
THROMBIN TIME: 18.4 SEC (ref 0–23)

## 2021-02-11 NOTE — TELEPHONE ENCOUNTER
----- Message from Benjie Dugan MD sent at 2/10/2021  9:36 PM EST -----  Please review abnormally elevated anti cardiolipin IgG with the patient- she is to follow with hematology and referral is provided

## 2021-02-11 NOTE — TELEPHONE ENCOUNTER
kelsey    Pt called and advised of the below  She verbalized understanding  States that she already took care of this and her hematology appt is scheduled on 3/11/21 w/ Dr Srinivasa Stephens

## 2021-02-11 NOTE — TELEPHONE ENCOUNTER
New Patient Encounter    New Patient Intake Form   Patient Details:  John Ronquillo  1950  [id number]    Background Information:  [address] starts by opening a telephone encounter and gathering the following information   Who is calling to schedule? If not self, relationship to patient? self   Referring Provider Dr Raiza Mueller   What is the diagnosis? R76 0 (ICD-10-CM) - Anti-cardiolipin antibody positive    Is this diagnosis confirmed? Yes   When was the diagnosis? 2/2021   Is there a confirmed diagnosis from a biopsy/tissue reviewed by pathology? Were outside slides requested? NA   Is patient aware of diagnosis? Yes   Is there a personal history and what kind? Is there a family history and what kind? Reason for visit? New Diagnosis   Have you had any imaging or labs done? If so: when, where? yes  [facility]   Are records in [facility]? yes   If patient has a prior history of breast cancer were old records obtained? NA   Was the patient told to bring a disk? no   Does the patient smoke or Vape? no   If yes, how many packs or cartridges per day? Scheduling Information:   Preferred [place]:  [place]     Are there any dates/time the patient cannot be seen? Miscellaneous:    After completing the above information, please route to Financial Counselor and the appropriate Nurse Navigator for review

## 2021-02-12 LAB
B2 GLYCOPROT1 IGA SER-ACNC: <9 GPI IGA UNITS (ref 0–25)
B2 GLYCOPROT1 IGG SER-ACNC: <9 GPI IGG UNITS (ref 0–20)
B2 GLYCOPROT1 IGM SER-ACNC: <9 GPI IGM UNITS (ref 0–32)
F2 GENE MUT ANL BLD/T: NORMAL

## 2021-02-15 LAB — F5 GENE MUT ANL BLD/T: NORMAL

## 2021-02-18 ENCOUNTER — OFFICE VISIT (OUTPATIENT)
Dept: INTERNAL MEDICINE CLINIC | Facility: CLINIC | Age: 71
End: 2021-02-18
Payer: MEDICARE

## 2021-02-18 VITALS
WEIGHT: 121.7 LBS | DIASTOLIC BLOOD PRESSURE: 78 MMHG | SYSTOLIC BLOOD PRESSURE: 108 MMHG | HEIGHT: 63 IN | OXYGEN SATURATION: 96 % | BODY MASS INDEX: 21.56 KG/M2 | HEART RATE: 80 BPM | TEMPERATURE: 97.1 F

## 2021-02-18 DIAGNOSIS — Z12.11 SCREENING FOR COLON CANCER: ICD-10-CM

## 2021-02-18 DIAGNOSIS — E78.5 HYPERLIPIDEMIA, UNSPECIFIED HYPERLIPIDEMIA TYPE: ICD-10-CM

## 2021-02-18 DIAGNOSIS — Z78.0 POSTMENOPAUSAL: ICD-10-CM

## 2021-02-18 DIAGNOSIS — G45.9 TRANSIENT ISCHEMIC ATTACK: Primary | ICD-10-CM

## 2021-02-18 DIAGNOSIS — Z12.31 VISIT FOR SCREENING MAMMOGRAM: ICD-10-CM

## 2021-02-18 DIAGNOSIS — F17.200 SMOKER: ICD-10-CM

## 2021-02-18 DIAGNOSIS — Z00.00 MEDICARE ANNUAL WELLNESS VISIT, SUBSEQUENT: ICD-10-CM

## 2021-02-18 DIAGNOSIS — E55.9 VITAMIN D DEFICIENCY: ICD-10-CM

## 2021-02-18 DIAGNOSIS — R73.9 HYPERGLYCEMIA: ICD-10-CM

## 2021-02-18 DIAGNOSIS — S22.000A COMPRESSION FRACTURE OF BODY OF THORACIC VERTEBRA (HCC): ICD-10-CM

## 2021-02-18 DIAGNOSIS — I49.49 ECTOPIC BEAT: ICD-10-CM

## 2021-02-18 DIAGNOSIS — J44.9 SEVERE CHRONIC OBSTRUCTIVE PULMONARY DISEASE (HCC): ICD-10-CM

## 2021-02-18 PROBLEM — G45.3 AMAUROSIS FUGAX: Status: ACTIVE | Noted: 2021-02-18

## 2021-02-18 PROBLEM — C44.91 BASAL CELL CARCINOMA OF SKIN: Status: ACTIVE | Noted: 2020-02-03

## 2021-02-18 PROCEDURE — 1123F ACP DISCUSS/DSCN MKR DOCD: CPT | Performed by: FAMILY MEDICINE

## 2021-02-18 PROCEDURE — G0438 PPPS, INITIAL VISIT: HCPCS | Performed by: FAMILY MEDICINE

## 2021-02-18 PROCEDURE — 99214 OFFICE O/P EST MOD 30 MIN: CPT | Performed by: FAMILY MEDICINE

## 2021-02-18 NOTE — PATIENT INSTRUCTIONS
Medicare Preventive Visit Patient Instructions  Thank you for completing your Welcome to Medicare Visit or Medicare Annual Wellness Visit today  Your next wellness visit will be due in one year (2/18/2022)  The screening/preventive services that you may require over the next 5-10 years are detailed below  Some tests may not apply to you based off risk factors and/or age  Screening tests ordered at today's visit but not completed yet may show as past due  Also, please note that scanned in results may not display below  Preventive Screenings:  Service Recommendations Previous Testing/Comments   Colorectal Cancer Screening  * Colonoscopy    * Fecal Occult Blood Test (FOBT)/Fecal Immunochemical Test (FIT)  * Fecal DNA/Cologuard Test  * Flexible Sigmoidoscopy Age: 54-65 years old   Colonoscopy: every 10 years (may be performed more frequently if at higher risk)  OR  FOBT/FIT: every 1 year  OR  Cologuard: every 3 years  OR  Sigmoidoscopy: every 5 years  Screening may be recommended earlier than age 48 if at higher risk for colorectal cancer  Also, an individualized decision between you and your healthcare provider will decide whether screening between the ages of 74-80 would be appropriate  Colonoscopy: Not on file  FOBT/FIT: Not on file  Cologuard: Not on file  Sigmoidoscopy: Not on file         Breast Cancer Screening Age: 36 years old  Frequency: every 1-2 years  Not required if history of left and right mastectomy Mammogram: Not on file       Cervical Cancer Screening Between the ages of 21-29, pap smear recommended once every 3 years  Between the ages of 33-67, can perform pap smear with HPV co-testing every 5 years     Recommendations may differ for women with a history of total hysterectomy, cervical cancer, or abnormal pap smears in past  Pap Smear: Not on file    Screening Not Indicated   Hepatitis C Screening Once for adults born between [facility]  More frequently in patients at high risk for Hepatitis C Hep C Antibody: Not on file       Diabetes Screening 1-2 times per year if you're at risk for diabetes or have pre-diabetes Fasting glucose: 84 mg/dL   A1C: 5 5 %    Screening Current   Cholesterol Screening Once every 5 years if you don't have a lipid disorder  May order more often based on risk factors  Lipid panel: 02/03/2020    Screening Current     Other Preventive Screenings Covered by Medicare:  1  Abdominal Aortic Aneurysm (AAA) Screening: covered once if your at risk  You're considered to be at risk if you have a family history of AAA  2  Lung Cancer Screening: covers low dose CT scan once per year if you meet all of the following conditions: (1) Age 50-69; (2) No signs or symptoms of lung cancer; (3) Current smoker or have quit smoking within the last 15 years; (4) You have a tobacco smoking history of at least 30 pack years (packs per day multiplied by number of years you smoked); (5) You get a written order from a healthcare provider  3  Glaucoma Screening: covered annually if you're considered high risk: (1) You have diabetes OR (2) Family history of glaucoma OR (3)  aged 48 and older OR (3)  American aged 72 and older  3  Osteoporosis Screening: covered every 2 years if you meet one of the following conditions: (1) You're estrogen deficient and at risk for osteoporosis based off medical history and other findings; (2) Have a vertebral abnormality; (3) On glucocorticoid therapy for more than 3 months; (4) Have primary hyperparathyroidism; (5) On osteoporosis medications and need to assess response to drug therapy  · Last bone density test (DXA Scan): Not on file  5  HIV Screening: covered annually if you're between the age of 12-76  Also covered annually if you are younger than 13 and older than 72 with risk factors for HIV infection  For pregnant patients, it is covered up to 3 times per pregnancy      Immunizations:  Immunization Recommendations   Influenza Vaccine Annual influenza vaccination during flu season is recommended for all persons aged >= 6 months who do not have contraindications   Pneumococcal Vaccine (Prevnar and Pneumovax)  * Prevnar = PCV13  * Pneumovax = PPSV23   Adults 25-60 years old: 1-3 doses may be recommended based on certain risk factors  Adults 72 years old: Prevnar (PCV13) vaccine recommended followed by Pneumovax (PPSV23) vaccine  If already received PPSV23 since turning 65, then PCV13 recommended at least one year after PPSV23 dose  Hepatitis B Vaccine 3 dose series if at intermediate or high risk (ex: diabetes, end stage renal disease, liver disease)   Tetanus (Td) Vaccine - COST NOT COVERED BY MEDICARE PART B Following completion of primary series, a booster dose should be given every 10 years to maintain immunity against tetanus  Td may also be given as tetanus wound prophylaxis  Tdap Vaccine - COST NOT COVERED BY MEDICARE PART B Recommended at least once for all adults  For pregnant patients, recommended with each pregnancy  Shingles Vaccine (Shingrix) - COST NOT COVERED BY MEDICARE PART B  2 shot series recommended in those aged 48 and above     Health Maintenance Due:      Topic Date Due    Hepatitis C Screening  1950    MAMMOGRAM  1950    Colorectal Cancer Screening  09/03/1998     Immunizations Due:      Topic Date Due    DTaP,Tdap,and Td Vaccines (1 - Tdap) 09/03/1969    Influenza Vaccine (1) 09/01/2020     Advance Directives   What are advance directives? Advance directives are legal documents that state your wishes and plans for medical care  These plans are made ahead of time in case you lose your ability to make decisions for yourself  Advance directives can apply to any medical decision, such as the treatments you want, and if you want to donate organs  What are the types of advance directives? There are many types of advance directives, and each state has rules about how to use them   You may choose a combination of any of the following:  · Living will: This is a written record of the treatment you want  You can also choose which treatments you do not want, which to limit, and which to stop at a certain time  This includes surgery, medicine, IV fluid, and tube feedings  · Durable power of  for healthcare [facility]): This is a written record that states who you want to make healthcare choices for you when you are unable to make them for yourself  This person, called a proxy, is usually a family member or a friend  You may choose more than 1 proxy  · Do not resuscitate (DNR) order:  A DNR order is used in case your heart stops beating or you stop breathing  It is a request not to have certain forms of treatment, such as CPR  A DNR order may be included in other types of advance directives  · Medical directive: This covers the care that you want if you are in a coma, near death, or unable to make decisions for yourself  You can list the treatments you want for each condition  Treatment may include pain medicine, surgery, blood transfusions, dialysis, IV or tube feedings, and a ventilator (breathing machine)  · Values history: This document has questions about your views, beliefs, and how you feel and think about life  This information can help others choose the care that you would choose  Why are advance directives important? An advance directive helps you control your care  Although spoken wishes may be used, it is better to have your wishes written down  Spoken wishes can be misunderstood, or not followed  Treatments may be given even if you do not want them  An advance directive may make it easier for your family to make difficult choices about your care  Urinary Incontinence   Urinary incontinence (UI)  is when you lose control of your bladder  UI develops because your bladder cannot store or empty urine properly   The 3 most common types of UI are stress incontinence, urge incontinence, or both   Medicines:   · May be given to help strengthen your bladder control  Report any side effects of medication to your healthcare provider  Do pelvic muscle exercises often:  Your pelvic muscles help you stop urinating  Squeeze these muscles tight for 5 seconds, then relax for 5 seconds  Gradually work up to squeezing for 10 seconds  Do 3 sets of 15 repetitions a day, or as directed  This will help strengthen your pelvic muscles and improve bladder control  Train your bladder:  Go to the bathroom at set times, such as every 2 hours, even if you do not feel the urge to go  You can also try to hold your urine when you feel the urge to go  For example, hold your urine for 5 minutes when you feel the urge to go  As that becomes easier, hold your urine for 10 minutes  Self-care:   · Keep a UI record  Write down how often you leak urine and how much you leak  Make a note of what you were doing when you leaked urine  · Drink liquids as directed  You may need to limit the amount of liquid you drink to help control your urine leakage  Do not drink any liquid right before you go to bed  Limit or do not have drinks that contain caffeine or alcohol  · Prevent constipation  Eat a variety of high-fiber foods  Good examples are high-fiber cereals, beans, vegetables, and whole-grain breads  Walking is the best way to trigger your intestines to have a bowel movement  · Exercise regularly and maintain a healthy weight  Weight loss and exercise will decrease pressure on your bladder and help you control your leakage  · Use a catheter as directed  to help empty your bladder  A catheter is a tiny, plastic tube that is put into your bladder to drain your urine  · Go to behavior therapy as directed  Behavior therapy may be used to help you learn to control your urge to urinate      Cigarette Smoking and Your Health   Risks to your health if you smoke:  Nicotine and other chemicals found in tobacco damage every cell in your body  Even if you are a light smoker, you have an increased risk for cancer, heart disease, and lung disease  If you are pregnant or have diabetes, smoking increases your risk for complications  Benefits to your health if you stop smoking:   · You decrease respiratory symptoms such as coughing, wheezing, and shortness of breath  · You reduce your risk for cancers of the lung, mouth, throat, kidney, bladder, pancreas, stomach, and cervix  If you already have cancer, you increase the benefits of chemotherapy  You also reduce your risk for cancer returning or a second cancer from developing  · You reduce your risk for heart disease, blood clots, heart attack, and stroke  · You reduce your risk for lung infections, and diseases such as pneumonia, asthma, chronic bronchitis, and emphysema  · Your circulation improves  More oxygen can be delivered to your body  If you have diabetes, you lower your risk for complications, such as kidney, artery, and eye diseases  You also lower your risk for nerve damage  Nerve damage can lead to amputations, poor vision, and blindness  · You improve your body's ability to heal and to fight infections  For more information and support to stop smoking:   · [employer]  gov  Phone: 4- 145 - 822-4982  Web Address: www OrderWithMe  [address] [place] [address] Information is for End User's use only and may not be sold, redistributed or otherwise used for commercial purposes   All illustrations and images included in CareNotes® are the copyrighted property of [employer]  or [address] [place] [address]

## 2021-02-18 NOTE — PROGRESS NOTES
Assessment and Plan:     Problem List Items Addressed This Visit        Respiratory    Severe chronic obstructive pulmonary disease ([address] )    Relevant Orders    CBC and differential    Comprehensive metabolic panel       Cardiovascular and Mediastinum    Transient ischemic attack - Primary    Relevant Orders    Echo complete with contrast if indicated    Holter monitor - 48 hour    CBC and differential    Comprehensive metabolic panel    Lipid panel    TSH, 3rd generation    Ectopic beat    Relevant Orders    Echo complete with contrast if indicated    Holter monitor - 48 hour    CBC and differential    Comprehensive metabolic panel    TSH, 3rd generation       Musculoskeletal and Integument    Compression fracture of body of thoracic vertebra (HCC)    Relevant Orders    CBC and differential    Comprehensive metabolic panel    TSH, 3rd generation    DXA bone density spine hip and pelvis       Other    Hyperglycemia    Relevant Orders    Hemoglobin A1C    Hyperlipidemia    Relevant Orders    Lipid panel    TSH, 3rd generation    Smoker    Relevant Orders    CBC and differential    Comprehensive metabolic panel    Vitamin D deficiency    Relevant Orders    CBC and differential    Comprehensive metabolic panel    Vitamin D 25 hydroxy      Other Visit Diagnoses     Visit for screening mammogram        Relevant Orders    Mammo screening bilateral w 3d & cad    Postmenopausal        Relevant Orders    CBC and differential    Comprehensive metabolic panel    DXA bone density spine hip and pelvis    Screening for colon cancer        Relevant Orders    Cologuard Medicare annual wellness visit, subsequent              Depression Screening and Follow-up Plan: Patient's depression screening was positive with a PHQ-2 score of 0  Clincally patient does not have depression  No treatment is required  Tobacco Cessation Counseling: Tobacco cessation counseling was provided   The patient is sincerely urged to quit consumption of tobacco  She is ready to quit tobacco  Medication options and side effects of medication discussed  Preventive health issues were discussed with patient, and age appropriate screening tests were ordered as noted in patient's After Visit Summary  Personalized health advice and appropriate referrals for health education or preventive services given if needed, as noted in patient's After Visit Summary  History of Present Illness:     Patient presents for Welcome to Medicare visit  Patient Care Team:  Anh Samuel as PCP - General (Family Medicine)     Review of Systems:     Review of Systems   Problem List:     Patient Active Problem List   Diagnosis    Vision disturbance    Vision loss, bilateral    Transient ischemic attack    Basal cell carcinoma of skin    Hyperglycemia    Hyperlipidemia    Severe chronic obstructive pulmonary disease ([facility] )    Smoker    Vitamin D deficiency    Amaurosis fugax    Compression fracture of body of thoracic vertebra (HCC)    Ectopic beat      Past Medical and Surgical History:     Past Medical History:   Diagnosis Date    Clotting disorder ([place] [address] )     COPD (chronic obstructive pulmonary disease) ([place] [address] )      History reviewed  No pertinent surgical history  Family History:     No family history on file  Social History:        Social History     Socioeconomic History    Marital status:       Spouse name: None    Number of children: None    Years of education: None    Highest education level: None   Occupational History    None   Social Needs    Financial resource strain: None    Food insecurity     Worry: None     Inability: None    Transportation needs     Medical: None     Non-medical: None   Tobacco Use    Smoking status: Current Every Day Smoker     Packs/day: 0 50     Types: Cigarettes    Smokeless tobacco: Never Used   Substance and Sexual Activity    Alcohol use: No    Drug use: No    Sexual activity: None   Lifestyle    Physical activity     Days per week: None     Minutes per session: None    Stress: None   Relationships    Social connections     Talks on phone: None     Gets together: None     Attends Baptism service: None     Active member of club or organization: None     Attends meetings of clubs or organizations: None     Relationship status: None    Intimate partner violence     Fear of current or ex partner: None     Emotionally abused: None     Physically abused: None     Forced sexual activity: None   Other Topics Concern    None   Social History Narrative    None      Medications and Allergies:     Current Outpatient Medications   Medication Sig Dispense Refill    albuterol (PROVENTIL HFA,VENTOLIN HFA) 90 mcg/act inhaler albuterol sulfate HFA 90 mcg/actuation aerosol inhaler      Ascorbic Acid (Vitamin C) 125 MG CHEW Chew 1 tablet daily      aspirin 81 mg chewable tablet Chew 81 mg daily      BIOTIN PO Take 1 tablet by mouth daily      Calcium Carbonate-Vit D-Min (CALTRATE 600+D PLUS MINERALS PO) Take 1 tablet by mouth daily      Cetirizine HCl (ZYRTEC ALLERGY PO) Take 1 tablet by mouth as needed      diphenhydrAMINE (BENADRYL) 25 mg tablet Take 25 mg by mouth as needed for itching       Multiple Vitamin (MULTI-VITAMIN DAILY) TABS Take by mouth daily      Trelegy Ellipta 200-62 5-25 MCG/INH AEPB inhaler       VITAMIN D PO Take 1 tablet by mouth daily      vitamin E 100 UNIT capsule Take 100 Units by mouth daily      Zinc Sulfate (ZINC-220 PO) Take 1 tablet by mouth daily       No current facility-administered medications for this visit        No Known Allergies   Immunizations:     Immunization History   Administered Date(s) Administered    Pneumococcal Polysaccharide PPV23 12/25/2014      Health Maintenance:         Topic Date Due    Hepatitis C Screening  1950    MAMMOGRAM  1950    Colorectal Cancer Screening  09/03/1998         Topic Date Due    DTaP,Tdap,and Td Vaccines (1 - Tdap) 09/03/1969    Influenza Vaccine (1) 09/01/2020      Medicare Screening Tests and Risk Assessments:     Mj Elias is here for her Initial Wellness visit  Health Risk Assessment:   Patient rates overall health as excellent  Patient feels that their physical health rating is same  Eyesight was rated as slightly worse  Hearing was rated as same  Patient feels that their emotional and mental health rating is slightly better  Pain experienced in the last 7 days has been none  Patient states that she has experienced no weight loss or gain in last 6 months  Depression Screening:   PHQ-2 Score: 0      Fall Risk Screening: In the past year, patient has experienced: no history of falling in past year      Urinary Incontinence Screening:   Patient has leaked urine accidently in the last six months  Home Safety:  Patient does not have trouble with stairs inside or outside of their home  Patient has working smoke alarms and has working carbon monoxide detector  Home safety hazards include: none  Nutrition:   Current diet is Regular  Medications:   Patient is currently taking over-the-counter supplements  OTC medications include: see medication list  Patient is able to manage medications  Activities of Daily Living (ADLs)/Instrumental Activities of Daily Living (IADLs):   Walk and transfer into and out of bed and chair?: Yes  Dress and groom yourself?: Yes    Bathe or shower yourself?: Yes    Feed yourself?  Yes  Do your laundry/housekeeping?: Yes  Manage your money, pay your bills and track your expenses?: Yes  Make your own meals?: Yes    Do your own shopping?: Yes    Advance Care Planning:   Living will: Yes    Advanced directive: Yes    Provider agrees with end of life decisions: Yes      PREVENTIVE SCREENINGS      Cardiovascular Screening:    General: Screening Current      Diabetes Screening:     General: Screening Current      Colorectal Cancer Screening:     General: Risks and Benefits Discussed Due for: Cologuard      Breast Cancer Screening:     General: Risks and Benefits Discussed    Due for: Mammogram        Cervical Cancer Screening:    General: Screening Not Indicated      Osteoporosis Screening:    General: Risks and Benefits Discussed    Due for: DXA Axial      Abdominal Aortic Aneurysm (AAA) Screening:        General: Screening Not Indicated      Lung Cancer Screening:     General: Screening Not Indicated and Screening Current      Hepatitis C Screening:    General: Risks and Benefits Discussed    No exam data present     Physical Exam:     /78   Pulse 80   Temp (!) 97 1 °F (36 2 °C)   Ht 5' 3" (1 6 m)   Wt 55 2 kg (121 lb 11 2 oz)   SpO2 96%   BMI 21 56 kg/m²     Physical Exam     Jenny Feliciano MD

## 2021-02-19 NOTE — PROGRESS NOTES
Assessment/Plan:    No problem-specific Assessment & Plan notes found for this encounter  Diagnoses and all orders for this visit:    Transient ischemic attack  -     Echo complete with contrast if indicated; Future  -     Holter monitor - 48 hour; Future  -     CBC and differential; Future  -     Comprehensive metabolic panel; Future  -     Lipid panel; Future  -     TSH, 3rd generation; Future    Vitamin D deficiency  -     CBC and differential; Future  -     Comprehensive metabolic panel; Future  -     Vitamin D 25 hydroxy; Future    Severe chronic obstructive pulmonary disease (HCC)  -     CBC and differential; Future  -     Comprehensive metabolic panel; Future    Compression fracture of body of thoracic vertebra (HCC)  -     CBC and differential; Future  -     Comprehensive metabolic panel; Future  -     TSH, 3rd generation; Future  -     DXA bone density spine hip and pelvis; Future    Smoker  -     CBC and differential; Future  -     Comprehensive metabolic panel; Future    Visit for screening mammogram  -     Mammo screening bilateral w 3d & cad; Future    Postmenopausal  -     CBC and differential; Future  -     Comprehensive metabolic panel; Future  -     DXA bone density spine hip and pelvis; Future    Screening for colon cancer  -     Cologuard; Future    Ectopic beat  -     Echo complete with contrast if indicated; Future  -     Holter monitor - 48 hour; Future  -     CBC and differential; Future  -     Comprehensive metabolic panel; Future  -     TSH, 3rd generation; Future    Hyperglycemia  -     Hemoglobin A1C; Future    Hyperlipidemia, unspecified hyperlipidemia type  -     Lipid panel; Future  -     TSH, 3rd generation; Future    Other orders  -     vitamin E 100 UNIT capsule; Take 100 Units by mouth daily        Orders and recommendations as noted above  Available previous records reviewed  Reviewed the symptoms of her TIA from September with her  Discussed the potential causes of this  Some of the evaluation  Has already been completed by her previous physician and Neurology  Discussed with her getting a Holter monitor as well as an echocardiogram for further investigation  She does have some ectopy heard on exam   This is, however, overall regular and does not sound consistent with atrial fibrillation  Continue follow-up with Neurology  Continue follow-up as scheduled with Hematology  Slip given for laboratory testing but advised her to hold off until she sees Hematology since they will likely will order additional laboratory testing  Smoking cessation discussed  Discussed with her methods for smoking cessation but she wishes to just try continuing to cut back rather than patches or other options  Reviewed the testing that she had done previously with her  It did show a compression fracture in the T4 vertebrae  Discussed with her getting a bone density for further investigation  Based on her small body frame as well as her being fair skinned and fair eyed, she is likely at high risk for significant osteoporosis  She has not had a bone density done in many years  Continue with calcium and vitamin-D supplementation  Try to remain as active as possible  Weightbearing exercise such as walking recommended  Be very careful to avoid falls  Will check vitamin-D level with upcoming laboratory testing  Slip given for mammogram since it has been many years since this was done  Discussed with her colorectal cancer screening  She wishes to proceed with the Cologuard  Wishes to hold off on colonoscopy  Discussed with her the Trelegy  She has not had any significant symptoms of COPD at present  She wishes to hold off on this medication  Discussed with her getting pulmonary function testing in the future  Continue follow-up with Ophthalmology on a regular basis  Discussed pneumonia vaccine with her  She has had the 1 pneumonia vaccine    Discussed the Prevnar 13 but she wishes to hold off at present until she can not complete coronavirus vaccine  Will have her follow up in about 3-5 months or sooner if needed depending on the results of the testing  Subjective:      Patient ID: Alvin Esparza is a 65 y o  female  She presents to establish as a new patient to the practice  She has had some issues this past year  She developed sudden onset of vision changes in the fall  Was subsequently diagnosed with TIA to the eye  She had imaging completed and ophthalmology evaluation  Some mild plaquing that was not significant was seen in the carotids  MRI just showed small-vessel changes  She was referred to Neurology and they had done some additional testing which showed some increased risk for a hypercoagulable state  They had referred her to Hematology and she will see them in the near future  Has not had any recurrent symptoms  Vision has not completely returned to normal   She has been trying to cut back on smoking to decrease her risk for subsequent TIAs or strokes  She had cut back to half pack a day but has since gone back up  She is trying again for this  She has occasional shortness of breath in does have some issues with recurrent bronchitis in the past   Denies any significant cough or any recent shortness of breath or changes in activity level  Was given Trelegy by her previous PCP but has not been taking it regularly  She wishes to avoid medications if at all possible  Continues with vitamin supplementation  Does have a history of a low vitamin-D level in the past   She did have 1 pneumonia vaccine but not the 2nd  Does not typically get the flu shot  She is interested in the coronavirus vaccine when it is available to her  Did have of fall over the last few years and had pain in her back  Was not told that there was any abnormalities when she had x-rays done  During the workup for the TIA she was found to have an old T4 compression fracture    Has not had any recent bone densities done  No recent mammograms  Has not had colorectal cancer screening  Denies any chest pain or palpitations  She was told by Neurology that she should have a Holter monitor for further investigation  Has not had echocardiogram      The following portions of the patient's history were reviewed and updated as appropriate:   She  has a past medical history of Clotting disorder ([facility] ) and COPD (chronic obstructive pulmonary disease) ([place] [address] )  She   Patient Active Problem List    Diagnosis Date Noted    Amaurosis fugax 02/18/2021    Compression fracture of body of thoracic vertebra ([facility] ) 02/18/2021    Vision disturbance 02/09/2021    Vision loss, bilateral 02/09/2021    Severe chronic obstructive pulmonary disease ([place] [address] ) 01/25/2021    Transient ischemic attack 10/29/2020    Hyperglycemia 04/27/2020    Hyperlipidemia 04/27/2020    Vitamin D deficiency 04/27/2020    Basal cell carcinoma of skin 02/03/2020    Smoker 02/03/2020     She  has no past surgical history on file  Her family history is not on file  She  reports that she has been smoking cigarettes  She has been smoking about 0 50 packs per day  She has never used smokeless tobacco  She reports that she does not drink alcohol or use drugs    Current Outpatient Medications   Medication Sig Dispense Refill    albuterol (PROVENTIL HFA,VENTOLIN HFA) 90 mcg/act inhaler albuterol sulfate HFA 90 mcg/actuation aerosol inhaler      Ascorbic Acid (Vitamin C) 125 MG CHEW Chew 1 tablet daily      aspirin 81 mg chewable tablet Chew 81 mg daily      BIOTIN PO Take 1 tablet by mouth daily      Calcium Carbonate-Vit D-Min (CALTRATE 600+D PLUS MINERALS PO) Take 1 tablet by mouth daily      Cetirizine HCl (ZYRTEC ALLERGY PO) Take 1 tablet by mouth as needed      diphenhydrAMINE (BENADRYL) 25 mg tablet Take 25 mg by mouth as needed for itching       Multiple Vitamin (MULTI-VITAMIN DAILY) TABS Take by mouth daily      Trelegy Ellipta 200-62 5-25 MCG/INH AEPB inhaler       VITAMIN D PO Take 1 tablet by mouth daily      vitamin E 100 UNIT capsule Take 100 Units by mouth daily      Zinc Sulfate (ZINC-220 PO) Take 1 tablet by mouth daily       No current facility-administered medications for this visit  Current Outpatient Medications on File Prior to Visit   Medication Sig    albuterol (PROVENTIL HFA,VENTOLIN HFA) 90 mcg/act inhaler albuterol sulfate HFA 90 mcg/actuation aerosol inhaler    Ascorbic Acid (Vitamin C) 125 MG CHEW Chew 1 tablet daily    aspirin 81 mg chewable tablet Chew 81 mg daily    BIOTIN PO Take 1 tablet by mouth daily    Calcium Carbonate-Vit D-Min (CALTRATE 600+D PLUS MINERALS PO) Take 1 tablet by mouth daily    Cetirizine HCl (ZYRTEC ALLERGY PO) Take 1 tablet by mouth as needed    diphenhydrAMINE (BENADRYL) 25 mg tablet Take 25 mg by mouth as needed for itching     Multiple Vitamin (MULTI-VITAMIN DAILY) TABS Take by mouth daily    Trelegy Ellipta 200-62 5-25 MCG/INH AEPB inhaler     VITAMIN D PO Take 1 tablet by mouth daily    vitamin E 100 UNIT capsule Take 100 Units by mouth daily    Zinc Sulfate (ZINC-220 PO) Take 1 tablet by mouth daily     No current facility-administered medications on file prior to visit  She has No Known Allergies       Review of Systems   Constitutional: Negative for activity change, appetite change, chills and fever  HENT: Negative for congestion and rhinorrhea  Eyes: Positive for visual disturbance  Respiratory: Negative for cough, chest tightness and shortness of breath  Cardiovascular: Negative for chest pain and palpitations  Gastrointestinal: Negative for abdominal pain, blood in stool, diarrhea, nausea and vomiting  Endocrine: Negative for polydipsia, polyphagia and polyuria  Genitourinary: Negative for dysuria, frequency and urgency  Musculoskeletal: Negative for gait problem  Skin: Negative for color change     Neurological: Negative for dizziness, speech difficulty, weakness, light-headedness, numbness and headaches  Hematological: Does not bruise/bleed easily  Psychiatric/Behavioral: Negative for confusion, decreased concentration and sleep disturbance  The patient is not nervous/anxious  Objective:      /78   Pulse 80   Temp (!) 97 1 °F (36 2 °C)   Ht 5' 3" (1 6 m)   Wt 55 2 kg (121 lb 11 2 oz)   SpO2 96%   BMI 21 56 kg/m²          Physical Exam  Vitals signs and nursing note reviewed  Constitutional:       General: She is not in acute distress  Appearance: She is well-developed, well-groomed and normal weight  HENT:      Head: Normocephalic and atraumatic  Comments:  minimal cerumen  Eyes:      General:         Right eye: No discharge  Left eye: No discharge  Conjunctiva/sclera: Conjunctivae normal       Pupils: Pupils are equal, round, and reactive to light  Neck:      Thyroid: No thyromegaly  Vascular: No carotid bruit  Cardiovascular:      Rate and Rhythm: Normal rate and regular rhythm  Heart sounds: Normal heart sounds  No murmur  No friction rub  No gallop  Pulmonary:      Effort: No respiratory distress  Breath sounds: Decreased breath sounds present  No wheezing or rales  Abdominal:      General: Bowel sounds are normal  There is no distension  Tenderness: There is no abdominal tenderness  Lymphadenopathy:      Cervical: No cervical adenopathy  Skin:     General: Skin is warm and dry  Neurological:      General: No focal deficit present  Mental Status: She is alert and oriented to person, place, and time  Psychiatric:         Mood and Affect: Mood and affect normal          Speech: Speech normal          Behavior: Behavior normal  Behavior is cooperative

## 2021-03-10 ENCOUNTER — TELEPHONE (OUTPATIENT)
Dept: HEMATOLOGY ONCOLOGY | Facility: CLINIC | Age: 71
End: 2021-03-10

## 2021-03-10 DIAGNOSIS — Z23 ENCOUNTER FOR IMMUNIZATION: ICD-10-CM

## 2021-03-10 NOTE — TELEPHONE ENCOUNTER
Patient called to do COVID-19 pre screening   Patient answered no to COVID-19 pre screening questions

## 2021-03-11 ENCOUNTER — CONSULT (OUTPATIENT)
Dept: HEMATOLOGY ONCOLOGY | Facility: CLINIC | Age: 71
End: 2021-03-11
Payer: MEDICARE

## 2021-03-11 VITALS
TEMPERATURE: 97.3 F | HEIGHT: 63 IN | DIASTOLIC BLOOD PRESSURE: 62 MMHG | OXYGEN SATURATION: 97 % | WEIGHT: 120.2 LBS | BODY MASS INDEX: 21.3 KG/M2 | HEART RATE: 69 BPM | SYSTOLIC BLOOD PRESSURE: 132 MMHG | RESPIRATION RATE: 16 BRPM

## 2021-03-11 DIAGNOSIS — H54.3 VISION LOSS, BILATERAL: ICD-10-CM

## 2021-03-11 DIAGNOSIS — R76.0 ANTI-CARDIOLIPIN ANTIBODY POSITIVE: Primary | ICD-10-CM

## 2021-03-11 DIAGNOSIS — R26.81 UNSTEADINESS ON FEET: ICD-10-CM

## 2021-03-11 PROCEDURE — 99204 OFFICE O/P NEW MOD 45 MIN: CPT | Performed by: INTERNAL MEDICINE

## 2021-03-11 NOTE — PROGRESS NOTES
Oncology Consult Note  Ishmael Lind 65 y o  female MRN: [id number]  Unit/Bed#:  Encounter: [id number]      Presenting Complaint: elevated anticardiolipin IgG antibodies, history of TIA    History of Presenting Illness:   70-year-old  female who had brief event of complete loss of vision in both eyes for 10-15 minutes with complete resolution of her symptoms in September 2020 MRI of the brain on 10/27/2020 was reported normal no evidence of intracranial hemorrhage, masses    There was small scattered hyper intensities in the periventricular and subcortical white matter most likely represent mild microangiopathic changes    Carotid studies showed less than 50% stenosis bilaterally    TSH, hemoglobin A1c, CBC, CMP were reported normal    Initiated on aspirin 81 mg p o  daily and diagnosed with TIA    Subsequently she was found to have vitamin-D deficiency, she never had any screening tests in her life including mammogram, colonoscopy    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    Thrombophilia profile showed elevated anticardiolipin IgG antibodies at 115 ( 0-14)    She smokes 1 pack of cigarettes daily, she does not drink ALK    She is to report floaters not anymore     Denies any headache blurred vision nausea vomiting diarrhea constipation dysuria hematuria melena hematochezia vaginal bleeding, weight loss, night sweats       Review of Systems - As stated in the HPI otherwise the fourteen point review of systems was negative  Past Medical History:   Diagnosis Date    Clotting disorder ([address] )     COPD (chronic obstructive pulmonary disease) ([facility])        Social History     Socioeconomic History    Marital status:       Spouse name: None    Number of children: None    Years of education: None    Highest education level: None   Occupational History    None   Social Needs    Financial resource strain: None    Food insecurity     Worry: None     Inability: None    Transportation needs     Medical: None     Non-medical: None   Tobacco Use    Smoking status: Current Every Day Smoker     Packs/day: 0 50     Types: Cigarettes    Smokeless tobacco: Never Used   Substance and Sexual Activity    Alcohol use: No    Drug use: No    Sexual activity: None   Lifestyle    Physical activity     Days per week: None     Minutes per session: None    Stress: None   Relationships    Social connections     Talks on phone: None     Gets together: None     Attends Gnosticism service: None     Active member of club or organization: None     Attends meetings of clubs or organizations: None     Relationship status: None    Intimate partner violence     Fear of current or ex partner: None     Emotionally abused: None     Physically abused: None     Forced sexual activity: None   Other Topics Concern    None   Social History Narrative    None       No family history on file      No Known Allergies      Current Outpatient Medications:     albuterol (PROVENTIL HFA,VENTOLIN HFA) 90 mcg/act inhaler, albuterol sulfate HFA 90 mcg/actuation aerosol inhaler, Disp: , Rfl:     Ascorbic Acid (Vitamin C) 125 MG CHEW, Chew 1 tablet daily, Disp: , Rfl:     aspirin 81 mg chewable tablet, Chew 81 mg daily, Disp: , Rfl:     BIOTIN PO, Take 1 tablet by mouth daily, Disp: , Rfl:     Calcium Carbonate-Vit D-Min (CALTRATE 600+D PLUS MINERALS PO), Take 1 tablet by mouth daily, Disp: , Rfl:     Cetirizine HCl (ZYRTEC ALLERGY PO), Take 1 tablet by mouth as needed, Disp: , Rfl:     diphenhydrAMINE (BENADRYL) 25 mg tablet, Take 25 mg by mouth as needed for itching , Disp: , Rfl:     Multiple Vitamin (MULTI-VITAMIN DAILY) TABS, Take by mouth daily, Disp: , Rfl:     Trelegy Ellipta 200-62 5-25 MCG/INH AEPB inhaler, , Disp: , Rfl:     VITAMIN D PO, Take 1 tablet by mouth daily, Disp: , Rfl:     vitamin E 100 UNIT capsule, Take 100 Units by mouth daily, Disp: , Rfl:     Zinc Sulfate (ZINC-220 PO), Take 1 tablet by mouth daily, Disp: , Rfl:       /62 (BP Location: Left arm, Patient Position: Sitting, Cuff Size: Adult)   Pulse 69   Temp (!) 97 3 °F (36 3 °C) (Tympanic)   Resp 16   Ht 5' 3" (1 6 m)   Wt 54 5 kg (120 lb 3 2 oz)   SpO2 97%   BMI 21 29 kg/m²       General Appearance:    Alert, oriented        Eyes:    PERRL   Ears:    Normal external ear canals, both ears   Nose:   Nares normal, septum midline   Throat:   Mucosa moist  Pharynx without injection  Neck:   Supple       Lungs:     Clear to auscultation bilaterally   Chest Wall:    No tenderness or deformity    Heart:    Regular rate and rhythm       Abdomen:     Soft, non-tender, bowel sounds +, no organomegaly           Extremities:   Extremities no cyanosis or edema       Skin:   no rash or icterus  Lymph nodes:   Cervical, supraclavicular, and axillary nodes normal   Neurologic:   CNII-XII intact, normal strength, sensation and reflexes     Throughout               No results found for this or any previous visit (from the past 48 hour(s))  No results found  ECOG :0      Assessment and plan:     TIA in September 2020 in a patient who never been on aspirin, used to smoke 1 pack of cigarettes daily for many years    Recovered completely     MRI of the brain showed microangiopathic changes    Initiated on aspirin 81 mg p o  daily, thrombophilia profile showed anticardiolipin IgG antibodies at 115     Normal CBC, CMP     1  I agree with screening tests appropriate for her age, she had screening CT scan of the chest showed no evidence of masses    Scheduled for Cologuard and colonoscopy    2  Stop smoking     3   Continue aspirin 81 mg p o  daily and repeat anticardiolipin antibodies IgG in about 3 months, will add homocystine level to rule out elevated homocystine

## 2021-03-15 ENCOUNTER — IMMUNIZATIONS (OUTPATIENT)
Dept: FAMILY MEDICINE CLINIC | Facility: HOSPITAL | Age: 71
End: 2021-03-15

## 2021-03-15 DIAGNOSIS — Z23 ENCOUNTER FOR IMMUNIZATION: Primary | ICD-10-CM

## 2021-03-15 PROCEDURE — 0011A SARS-COV-2 / COVID-19 MRNA VACCINE (MODERNA) 100 MCG: CPT

## 2021-03-15 PROCEDURE — 91301 SARS-COV-2 / COVID-19 MRNA VACCINE (MODERNA) 100 MCG: CPT

## 2021-03-16 ENCOUNTER — HOSPITAL ENCOUNTER (OUTPATIENT)
Dept: NON INVASIVE DIAGNOSTICS | Facility: HOSPITAL | Age: 71
Discharge: HOME | End: 2021-03-16
Payer: MEDICARE

## 2021-03-16 DIAGNOSIS — I49.49 ECTOPIC BEAT: ICD-10-CM

## 2021-03-16 DIAGNOSIS — G45.9 TRANSIENT ISCHEMIC ATTACK: ICD-10-CM

## 2021-03-16 PROCEDURE — 93306 TTE W/DOPPLER COMPLETE: CPT | Performed by: INTERNAL MEDICINE

## 2021-03-16 PROCEDURE — 93225 XTRNL ECG REC<48 HRS REC: CPT

## 2021-03-16 PROCEDURE — 93226 XTRNL ECG REC<48 HR SCAN A/R: CPT

## 2021-03-16 PROCEDURE — 93306 TTE W/DOPPLER COMPLETE: CPT

## 2021-03-30 PROCEDURE — 93227 XTRNL ECG REC<48 HR R&I: CPT | Performed by: INTERNAL MEDICINE

## 2021-04-14 ENCOUNTER — IMMUNIZATIONS (OUTPATIENT)
Dept: FAMILY MEDICINE CLINIC | Facility: HOSPITAL | Age: 71
End: 2021-04-14

## 2021-04-14 DIAGNOSIS — Z23 ENCOUNTER FOR IMMUNIZATION: Primary | ICD-10-CM

## 2021-04-14 PROCEDURE — 0012A SARS-COV-2 / COVID-19 MRNA VACCINE (MODERNA) 100 MCG: CPT

## 2021-04-14 PROCEDURE — 91301 SARS-COV-2 / COVID-19 MRNA VACCINE (MODERNA) 100 MCG: CPT

## 2021-04-19 ENCOUNTER — HOSPITAL ENCOUNTER (OUTPATIENT)
Dept: MAMMOGRAPHY | Facility: HOSPITAL | Age: 71
Discharge: HOME | End: 2021-04-19
Payer: MEDICARE

## 2021-04-19 ENCOUNTER — HOSPITAL ENCOUNTER (OUTPATIENT)
Dept: BONE DENSITY | Facility: HOSPITAL | Age: 71
Discharge: HOME | End: 2021-04-19
Payer: MEDICARE

## 2021-04-19 VITALS — WEIGHT: 120 LBS | HEIGHT: 63 IN | BODY MASS INDEX: 21.26 KG/M2

## 2021-04-19 DIAGNOSIS — Z78.0 POSTMENOPAUSAL: ICD-10-CM

## 2021-04-19 DIAGNOSIS — S22.000A COMPRESSION FRACTURE OF BODY OF THORACIC VERTEBRA (HCC): ICD-10-CM

## 2021-04-19 DIAGNOSIS — Z12.31 VISIT FOR SCREENING MAMMOGRAM: ICD-10-CM

## 2021-04-19 PROCEDURE — 77067 SCR MAMMO BI INCL CAD: CPT

## 2021-04-19 PROCEDURE — 77063 BREAST TOMOSYNTHESIS BI: CPT

## 2021-04-19 PROCEDURE — 77080 DXA BONE DENSITY AXIAL: CPT

## 2021-05-02 DIAGNOSIS — S22.000A COMPRESSION FRACTURE OF BODY OF THORACIC VERTEBRA (HCC): Primary | ICD-10-CM

## 2021-05-02 DIAGNOSIS — M81.0 AGE-RELATED OSTEOPOROSIS WITHOUT CURRENT PATHOLOGICAL FRACTURE: ICD-10-CM

## 2021-05-02 RX ORDER — ALENDRONATE SODIUM 70 MG/1
70 TABLET ORAL
Qty: 12 TABLET | Refills: 3 | Status: SHIPPED | OUTPATIENT
Start: 2021-05-02 | End: 2021-06-21 | Stop reason: SDUPTHER

## 2021-05-02 RX ORDER — ALENDRONATE SODIUM 70 MG/1
70 TABLET ORAL
Qty: 12 TABLET | Refills: 3 | Status: SHIPPED | OUTPATIENT
Start: 2021-05-02 | End: 2021-05-02 | Stop reason: SDUPTHER

## 2021-06-02 ENCOUNTER — APPOINTMENT (OUTPATIENT)
Dept: LAB | Facility: CLINIC | Age: 71
End: 2021-06-02
Payer: MEDICARE

## 2021-06-02 ENCOUNTER — TRANSCRIBE ORDERS (OUTPATIENT)
Dept: LAB | Facility: CLINIC | Age: 71
End: 2021-06-02

## 2021-06-02 DIAGNOSIS — R73.9 HYPERGLYCEMIA: ICD-10-CM

## 2021-06-02 DIAGNOSIS — Z78.0 POSTMENOPAUSAL: ICD-10-CM

## 2021-06-02 DIAGNOSIS — G45.9 TRANSIENT ISCHEMIC ATTACK: ICD-10-CM

## 2021-06-02 DIAGNOSIS — H54.3 VISION LOSS, BILATERAL: ICD-10-CM

## 2021-06-02 DIAGNOSIS — F17.200 SMOKER: ICD-10-CM

## 2021-06-02 DIAGNOSIS — I49.49 ECTOPIC BEAT: ICD-10-CM

## 2021-06-02 DIAGNOSIS — E78.5 HYPERLIPIDEMIA, UNSPECIFIED HYPERLIPIDEMIA TYPE: ICD-10-CM

## 2021-06-02 DIAGNOSIS — R26.81 UNSTEADINESS ON FEET: ICD-10-CM

## 2021-06-02 DIAGNOSIS — E55.9 VITAMIN D DEFICIENCY: ICD-10-CM

## 2021-06-02 DIAGNOSIS — R76.0 ANTI-CARDIOLIPIN ANTIBODY POSITIVE: ICD-10-CM

## 2021-06-02 DIAGNOSIS — J44.9 SEVERE CHRONIC OBSTRUCTIVE PULMONARY DISEASE (HCC): ICD-10-CM

## 2021-06-02 DIAGNOSIS — S22.000A COMPRESSION FRACTURE OF BODY OF THORACIC VERTEBRA (HCC): ICD-10-CM

## 2021-06-02 LAB
25(OH)D3 SERPL-MCNC: 58.7 NG/ML (ref 30–100)
ALBUMIN SERPL BCP-MCNC: 3.6 G/DL (ref 3.5–5)
ALP SERPL-CCNC: 111 U/L (ref 46–116)
ALT SERPL W P-5'-P-CCNC: 25 U/L (ref 12–78)
ANION GAP SERPL CALCULATED.3IONS-SCNC: 5 MMOL/L (ref 4–13)
AST SERPL W P-5'-P-CCNC: 18 U/L (ref 5–45)
BASOPHILS # BLD AUTO: 0.08 THOUSANDS/ΜL (ref 0–0.1)
BASOPHILS NFR BLD AUTO: 1 % (ref 0–1)
BILIRUB SERPL-MCNC: 0.29 MG/DL (ref 0.2–1)
BUN SERPL-MCNC: 13 MG/DL (ref 5–25)
CALCIUM SERPL-MCNC: 8.8 MG/DL (ref 8.3–10.1)
CHLORIDE SERPL-SCNC: 107 MMOL/L (ref 100–108)
CHOLEST SERPL-MCNC: 184 MG/DL (ref 50–200)
CO2 SERPL-SCNC: 26 MMOL/L (ref 21–32)
CREAT SERPL-MCNC: 0.69 MG/DL (ref 0.6–1.3)
EOSINOPHIL # BLD AUTO: 0.48 THOUSAND/ΜL (ref 0–0.61)
EOSINOPHIL NFR BLD AUTO: 6 % (ref 0–6)
ERYTHROCYTE [DISTWIDTH] IN BLOOD BY AUTOMATED COUNT: 15 % (ref 11.6–15.1)
EST. AVERAGE GLUCOSE BLD GHB EST-MCNC: 117 MG/DL
GFR SERPL CREATININE-BSD FRML MDRD: 87 ML/MIN/1.73SQ M
GLUCOSE P FAST SERPL-MCNC: 78 MG/DL (ref 65–99)
HBA1C MFR BLD: 5.7 %
HCT VFR BLD AUTO: 40.5 % (ref 34.8–46.1)
HCYS SERPL-SCNC: 7.9 UMOL/L (ref 3.7–11.2)
HDLC SERPL-MCNC: 78 MG/DL
HGB BLD-MCNC: 12.9 G/DL (ref 11.5–15.4)
IMM GRANULOCYTES # BLD AUTO: 0.01 THOUSAND/UL (ref 0–0.2)
IMM GRANULOCYTES NFR BLD AUTO: 0 % (ref 0–2)
LDLC SERPL CALC-MCNC: 94 MG/DL (ref 0–100)
LYMPHOCYTES # BLD AUTO: 2.56 THOUSANDS/ΜL (ref 0.6–4.47)
LYMPHOCYTES NFR BLD AUTO: 33 % (ref 14–44)
MCH RBC QN AUTO: 28.9 PG (ref 26.8–34.3)
MCHC RBC AUTO-ENTMCNC: 31.9 G/DL (ref 31.4–37.4)
MCV RBC AUTO: 91 FL (ref 82–98)
MONOCYTES # BLD AUTO: 0.67 THOUSAND/ΜL (ref 0.17–1.22)
MONOCYTES NFR BLD AUTO: 9 % (ref 4–12)
NEUTROPHILS # BLD AUTO: 4.04 THOUSANDS/ΜL (ref 1.85–7.62)
NEUTS SEG NFR BLD AUTO: 51 % (ref 43–75)
NONHDLC SERPL-MCNC: 106 MG/DL
NRBC BLD AUTO-RTO: 0 /100 WBCS
PLATELET # BLD AUTO: 273 THOUSANDS/UL (ref 149–390)
PMV BLD AUTO: 9.7 FL (ref 8.9–12.7)
POTASSIUM SERPL-SCNC: 4.3 MMOL/L (ref 3.5–5.3)
PROT SERPL-MCNC: 7.3 G/DL (ref 6.4–8.2)
RBC # BLD AUTO: 4.47 MILLION/UL (ref 3.81–5.12)
SODIUM SERPL-SCNC: 138 MMOL/L (ref 136–145)
TRIGL SERPL-MCNC: 59 MG/DL
TSH SERPL DL<=0.05 MIU/L-ACNC: 1.8 UIU/ML (ref 0.36–3.74)
WBC # BLD AUTO: 7.84 THOUSAND/UL (ref 4.31–10.16)

## 2021-06-02 PROCEDURE — 85025 COMPLETE CBC W/AUTO DIFF WBC: CPT

## 2021-06-02 PROCEDURE — 83090 ASSAY OF HOMOCYSTEINE: CPT

## 2021-06-02 PROCEDURE — 86147 CARDIOLIPIN ANTIBODY EA IG: CPT

## 2021-06-02 PROCEDURE — 82306 VITAMIN D 25 HYDROXY: CPT

## 2021-06-02 PROCEDURE — 84443 ASSAY THYROID STIM HORMONE: CPT

## 2021-06-02 PROCEDURE — 80053 COMPREHEN METABOLIC PANEL: CPT

## 2021-06-02 PROCEDURE — 83036 HEMOGLOBIN GLYCOSYLATED A1C: CPT

## 2021-06-02 PROCEDURE — 80061 LIPID PANEL: CPT

## 2021-06-02 PROCEDURE — 36415 COLL VENOUS BLD VENIPUNCTURE: CPT

## 2021-06-03 LAB
CARDIOLIPIN IGA SER IA-ACNC: 14
CARDIOLIPIN IGG SER IA-ACNC: 127
CARDIOLIPIN IGM SER IA-ACNC: <0.8

## 2021-06-10 ENCOUNTER — OFFICE VISIT (OUTPATIENT)
Dept: HEMATOLOGY ONCOLOGY | Facility: CLINIC | Age: 71
End: 2021-06-10
Payer: MEDICARE

## 2021-06-10 VITALS
WEIGHT: 116.6 LBS | BODY MASS INDEX: 20.66 KG/M2 | RESPIRATION RATE: 16 BRPM | OXYGEN SATURATION: 95 % | HEIGHT: 63 IN | DIASTOLIC BLOOD PRESSURE: 61 MMHG | SYSTOLIC BLOOD PRESSURE: 123 MMHG | TEMPERATURE: 96.4 F | HEART RATE: 72 BPM

## 2021-06-10 DIAGNOSIS — R76.0 ANTI-CARDIOLIPIN ANTIBODY POSITIVE: Primary | ICD-10-CM

## 2021-06-10 PROCEDURE — 99213 OFFICE O/P EST LOW 20 MIN: CPT | Performed by: PHYSICIAN ASSISTANT

## 2021-06-10 NOTE — PROGRESS NOTES
Hematology/Oncology Outpatient Follow- up Note  Armen Harman 65 y o  female MRN: @ Encounter: [id number]        Date:  6/10/2021      Assessment / Plan:    TIA in September 2020 in a patient who never had been on aspirin, recovered completely  MRI of the brain showed microangiopathic changes  Initiated on aspirin 81 mg p o  daily, thrombophilia profile 2/2021 showed anticardiolipin IgG antibodies at 115  Normal CBC, CMP   Age appropriate cancer screening along with  tobacco avoidance encouraged  At her 3/2021 office visit, she was advised to continue aspirin 81 mg p o  daily  Repeat anticardiolipin IgG antibody level was still elevated greater than 120 May 2021  Homocystine level normal        Given that she was not on ASA at the time of TIA and has never had a DVT, will defer prescription anticoagulation  Giver her persistently elevated IgG antibody, however, have a low threshold for prescribing blood thinner in the future if she were to ever have a clotting event  History of Presenting Illness:  Erica Gil is a 75-year-old  female seen initially 3/11/21 regarding elevated anticardiolipin IgG antibodies, history of TIA  She had a brief event of complete loss of vision in both eyes for 10-15 minutes with complete resolution of her symptoms in September 2020  MRI of the brain on 10/27/2020 was reported normal, no evidence of intracranial hemorrhage, masses  There were small scattered hyper intensities in the periventricular and subcortical white matter most likely representative of mild microangiopathic changes  Carotid studies showed less than 50% stenosis bilaterally        TSH, hemoglobin A1c, CBC, CMP were reported normal     Initiated on aspirin 81 mg p o  daily and diagnosed with TIA     Subsequently she was found to have vitamin-D deficiency, she never had any screening tests in her life including mammogram, colonoscopy     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  She states when she was pregnant with her son, born in 65, she may have had a superficial clot  Thrombophilia profile 29/21 showed elevated anticardiolipin IgG antibodies at 115 ( 0-14)     She smokes 1 pack of cigarettes daily, she does not drink alcohol  6/2/21:  Anticardiolipin IgG still elevated at 127  Homocysteine normal 7 9    Interval History:        Test Results:        Labs:   Lab Results   Component Value Date    HGB 12 9 06/02/2021    HCT 40 5 06/02/2021    MCV 91 06/02/2021     06/02/2021    WBC 7 84 06/02/2021    NRBC 0 06/02/2021     Lab Results   Component Value Date    K 4 3 06/02/2021     06/02/2021    CO2 26 06/02/2021    BUN 13 06/02/2021    CREATININE 0 69 06/02/2021    GLUF 78 06/02/2021    CALCIUM 8 8 06/02/2021    AST 18 06/02/2021    ALT 25 06/02/2021    ALKPHOS 111 06/02/2021    EGFR 87 06/02/2021       Imaging: No results found  ROS:  As mentioned in HPI & Interval History otherwise 14 point ROS negative  Allergies: No Known Allergies  Current Medications: Reviewed  PMH/FH/SH:  Reviewed      Physical Exam:    1 54 meters squared    Ht Readings from Last 3 Encounters:   06/10/21 5' 3" (1 6 m)   04/19/21 5' 3" (1 6 m)   03/11/21 5' 3" (1 6 m)        Wt Readings from Last 3 Encounters:   06/10/21 52 9 kg (116 lb 9 6 oz)   04/19/21 54 4 kg (120 lb)   03/11/21 54 5 kg (120 lb 3 2 oz)        Temp Readings from Last 3 Encounters:   06/10/21 (!) 96 4 °F (35 8 °C)   03/11/21 (!) 97 3 °F (36 3 °C) (Tympanic)   02/18/21 (!) 97 1 °F (36 2 °C)        BP Readings from Last 3 Encounters:   03/11/21 132/62   02/18/21 108/78   02/09/21 120/60           Physical Exam  Constitutional:       Appearance: She is well-developed  HENT:      Head: Normocephalic and atraumatic  Cardiovascular:      Rate and Rhythm: Normal rate  Pulmonary:      Effort: Pulmonary effort is normal  No respiratory distress     Skin:     General: Skin is warm and dry  Neurological:      Mental Status: She is alert     Psychiatric:         Behavior: Behavior normal            Emergency Contacts:    KEVIN [place] [address], [phone],

## 2021-06-21 ENCOUNTER — OFFICE VISIT (OUTPATIENT)
Dept: INTERNAL MEDICINE CLINIC | Facility: CLINIC | Age: 71
End: 2021-06-21
Payer: MEDICARE

## 2021-06-21 VITALS
HEIGHT: 63 IN | DIASTOLIC BLOOD PRESSURE: 66 MMHG | BODY MASS INDEX: 20.68 KG/M2 | SYSTOLIC BLOOD PRESSURE: 118 MMHG | TEMPERATURE: 97.5 F | WEIGHT: 116.7 LBS | HEART RATE: 65 BPM | OXYGEN SATURATION: 97 %

## 2021-06-21 DIAGNOSIS — E55.9 VITAMIN D DEFICIENCY: ICD-10-CM

## 2021-06-21 DIAGNOSIS — R73.9 HYPERGLYCEMIA: ICD-10-CM

## 2021-06-21 DIAGNOSIS — M81.0 AGE-RELATED OSTEOPOROSIS WITHOUT CURRENT PATHOLOGICAL FRACTURE: Primary | ICD-10-CM

## 2021-06-21 DIAGNOSIS — Z01.419 ENCOUNTER FOR GYNECOLOGICAL EXAMINATION: ICD-10-CM

## 2021-06-21 DIAGNOSIS — J44.9 SEVERE CHRONIC OBSTRUCTIVE PULMONARY DISEASE (HCC): ICD-10-CM

## 2021-06-21 DIAGNOSIS — R09.89 RIGHT CAROTID BRUIT: ICD-10-CM

## 2021-06-21 DIAGNOSIS — E78.5 HYPERLIPIDEMIA, UNSPECIFIED HYPERLIPIDEMIA TYPE: ICD-10-CM

## 2021-06-21 DIAGNOSIS — S22.000A COMPRESSION FRACTURE OF BODY OF THORACIC VERTEBRA (HCC): ICD-10-CM

## 2021-06-21 DIAGNOSIS — E44.1 MILD PROTEIN-CALORIE MALNUTRITION (HCC): ICD-10-CM

## 2021-06-21 PROBLEM — I34.0 MILD MITRAL REGURGITATION: Status: ACTIVE | Noted: 2021-06-21

## 2021-06-21 PROCEDURE — 99214 OFFICE O/P EST MOD 30 MIN: CPT | Performed by: FAMILY MEDICINE

## 2021-06-21 RX ORDER — ALENDRONATE SODIUM 70 MG/1
70 TABLET ORAL
Qty: 12 TABLET | Refills: 3 | Status: SHIPPED | OUTPATIENT
Start: 2021-06-21

## 2021-06-22 NOTE — PROGRESS NOTES
Assessment/Plan:    No problem-specific Assessment & Plan notes found for this encounter  Diagnoses and all orders for this visit:    Age-related osteoporosis without current pathological fracture  -     alendronate (Fosamax) 70 mg tablet; Take 1 tablet (70 mg total) by mouth every 7 days  -     CBC and differential; Future  -     TSH, 3rd generation; Future    Compression fracture of body of thoracic vertebra (HCC)  -     alendronate (Fosamax) 70 mg tablet; Take 1 tablet (70 mg total) by mouth every 7 days  -     CBC and differential; Future    Mild protein-calorie malnutrition (HCC)  -     CBC and differential; Future  -     Comprehensive metabolic panel; Future    Vitamin D deficiency  -     CBC and differential; Future  -     Vitamin D 25 hydroxy; Future    Right carotid bruit  -     CBC and differential; Future  -     VAS carotid complete study; Future    Severe chronic obstructive pulmonary disease (HCC)  -     CBC and differential; Future    Hyperglycemia  -     CBC and differential; Future  -     TSH, 3rd generation; Future  -     HEMOGLOBIN A1C W/ EAG ESTIMATION; Future    Hyperlipidemia, unspecified hyperlipidemia type  -     CBC and differential; Future  -     Comprehensive metabolic panel; Future  -     Lipid panel; Future  -     TSH, 3rd generation; Future    Encounter for gynecological examination  -     Ambulatory referral to Obstetrics / Gynecology; Future       Orders recommendations as noted above  Reviewed her previous testing with her  Bone density did show significant osteoporosis in both the hips and the back  Discussed with her starting the the Fosamax  She had not started it because pharmacy said that it was not there  Potential side effects discussed with her  Discussed options such as Prolia if she does not tolerate the Fosamax  Be very careful to avoid falls  Continue with calcium and vitamin-D supplementation  Weightbearing exercise such as walking recommended    Weight has been stable but still on the lower side  Increase protein in diet  She does have the carotid bruit on the right  She will be due for follow-up carotid Doppler in September  Echocardiogram showed mild mitral regurgitation  Will follow this yearly  Continue with the Trelegy  Discussed with her using this daily but she does not feel she needs it  Watch for any increased respiratory issues  Discussed goal of having LDL less than 100  She is up-to-date on mammogram and bone density  Will have the Cologuard sent to her since it was not received previously despite being ordered  Referral given for gyn  Will have her follow up in about 4-6 months or sooner if needed  Subjective:      Patient ID: Nguyễn Smith is a 65 y o  female  She presents for routine follow-up  Has generally been doing well  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  Somewhat less stressful now  Her vision has basically returned to normal   Denies any more vision changes  Denies any localized weakness  Breathing has remained stable  Does not use the Trelegy daily  Has only been using it really on an as-needed basis  Denies any significant shortness of breath  Denies any significant cough  Appetite has remained good  Weight is relatively stable  She had lost a significant amount await a few years ago after the death of her  but has been able to maintain her weight since then  Denies any nausea, vomiting, or diarrhea  Some trouble sleeping at times  Never received Cologuard in the mail  She is still interested in completing this  She has been trying to watch to avoid lifting  Denies any chest pain or palpitations  Denies any significant cough  Denies hemoptysis  Vision has improved        The following portions of the patient's history were reviewed and updated as appropriate:   She  has a past medical history of Clotting disorder ([address] ) and COPD (chronic obstructive pulmonary disease) ([facility] )  She   Patient Active Problem List    Diagnosis Date Noted    Mild protein-calorie malnutrition ([facility] ) 06/21/2021    Age-related osteoporosis without current pathological fracture 06/21/2021    Right carotid bruit 06/21/2021    Mild mitral regurgitation 06/21/2021    Anti-cardiolipin antibody positive 03/11/2021    Amaurosis fugax 02/18/2021    Compression fracture of body of thoracic vertebra (HCC) 02/18/2021    Ectopic beat 02/18/2021    Vision disturbance 02/09/2021    Vision loss, bilateral 02/09/2021    Severe chronic obstructive pulmonary disease ([facility] ) 01/25/2021    Transient ischemic attack 10/29/2020    Hyperglycemia 04/27/2020    Hyperlipidemia 04/27/2020    Vitamin D deficiency 04/27/2020    Basal cell carcinoma of skin 02/03/2020    Smoker 02/03/2020     She  has no past surgical history on file  Her family history includes Breast cancer in her maternal uncle; No Known Problems in her daughter, father, maternal aunt, maternal aunt, maternal grandmother, mother, paternal aunt, paternal aunt, paternal aunt, paternal grandmother, and sister  She  reports that she has been smoking cigarettes  She has been smoking about 0 50 packs per day  She has never used smokeless tobacco  She reports that she does not drink alcohol and does not use drugs    Current Outpatient Medications   Medication Sig Dispense Refill    albuterol (PROVENTIL HFA,VENTOLIN HFA) 90 mcg/act inhaler albuterol sulfate HFA 90 mcg/actuation aerosol inhaler      Ascorbic Acid (Vitamin C) 125 MG CHEW Chew 1 tablet daily      aspirin 81 mg chewable tablet Chew 81 mg daily      BIOTIN PO Take 1 tablet by mouth daily      Calcium Carbonate-Vit D-Min (CALTRATE 600+D PLUS MINERALS PO) Take 1 tablet by mouth daily      Cetirizine HCl (ZYRTEC ALLERGY PO) Take 1 tablet by mouth as needed      diphenhydrAMINE (BENADRYL) 25 mg tablet Take 25 mg by mouth as needed for itching       Multiple Vitamin (MULTI-VITAMIN DAILY) TABS Take by mouth daily      Trelegy Ellipta 200-62 5-25 MCG/INH AEPB inhaler       VITAMIN D PO Take 1 tablet by mouth daily      vitamin E 100 UNIT capsule Take 100 Units by mouth daily      Zinc Sulfate (ZINC-220 PO) Take 1 tablet by mouth daily      alendronate (Fosamax) 70 mg tablet Take 1 tablet (70 mg total) by mouth every 7 days 12 tablet 3     No current facility-administered medications for this visit  Current Outpatient Medications on File Prior to Visit   Medication Sig    albuterol (PROVENTIL HFA,VENTOLIN HFA) 90 mcg/act inhaler albuterol sulfate HFA 90 mcg/actuation aerosol inhaler    Ascorbic Acid (Vitamin C) 125 MG CHEW Chew 1 tablet daily    aspirin 81 mg chewable tablet Chew 81 mg daily    BIOTIN PO Take 1 tablet by mouth daily    Calcium Carbonate-Vit D-Min (CALTRATE 600+D PLUS MINERALS PO) Take 1 tablet by mouth daily    Cetirizine HCl (ZYRTEC ALLERGY PO) Take 1 tablet by mouth as needed    diphenhydrAMINE (BENADRYL) 25 mg tablet Take 25 mg by mouth as needed for itching     Multiple Vitamin (MULTI-VITAMIN DAILY) TABS Take by mouth daily    Trelegy Ellipta 200-62 5-25 MCG/INH AEPB inhaler     VITAMIN D PO Take 1 tablet by mouth daily    vitamin E 100 UNIT capsule Take 100 Units by mouth daily    Zinc Sulfate (ZINC-220 PO) Take 1 tablet by mouth daily    [DISCONTINUED] alendronate (Fosamax) 70 mg tablet Take 1 tablet (70 mg total) by mouth every 7 days (Patient not taking: Reported on 6/21/2021)     No current facility-administered medications on file prior to visit  She has No Known Allergies       Review of Systems   Constitutional: Negative for activity change, appetite change, chills, fatigue and fever  HENT: Negative for congestion and rhinorrhea  Eyes: Negative for visual disturbance  Respiratory: Negative for cough, chest tightness and shortness of breath  Cardiovascular: Negative for chest pain and palpitations     Gastrointestinal: Negative for abdominal pain, blood in stool, diarrhea, nausea and vomiting  Endocrine: Negative for polydipsia, polyphagia and polyuria  Genitourinary: Negative for dysuria, frequency and urgency  Musculoskeletal: Negative for gait problem  Skin: Negative for color change  Neurological: Negative for dizziness and headaches  Hematological: Does not bruise/bleed easily  Psychiatric/Behavioral: Negative for confusion and sleep disturbance  The patient is not nervous/anxious  Objective:      /66 (BP Location: Left arm, Patient Position: Sitting, Cuff Size: Standard)   Pulse 65   Temp 97 5 °F (36 4 °C)   Ht 5' 3" (1 6 m)   Wt 52 9 kg (116 lb 11 2 oz)   SpO2 97%   BMI 20 67 kg/m²          Physical Exam  Vitals and nursing note reviewed  Constitutional:       General: She is not in acute distress  Appearance: She is well-developed, well-groomed and underweight  HENT:      Head: Normocephalic and atraumatic  Eyes:      General:         Right eye: No discharge  Left eye: No discharge  Conjunctiva/sclera: Conjunctivae normal       Pupils: Pupils are equal, round, and reactive to light  Neck:      Thyroid: No thyromegaly  Vascular: Carotid bruit present  Cardiovascular:      Rate and Rhythm: Normal rate and regular rhythm  Heart sounds: Murmur heard  Systolic murmur is present with a grade of 1/6  No friction rub  No gallop  Pulmonary:      Effort: No respiratory distress  Breath sounds: Decreased breath sounds present  No wheezing or rales  Abdominal:      General: Bowel sounds are normal  There is no distension  Tenderness: There is no abdominal tenderness  Lymphadenopathy:      Cervical: No cervical adenopathy  Skin:     General: Skin is warm and dry  Neurological:      Mental Status: She is alert and oriented to person, place, and time     Psychiatric:         Mood and Affect: Mood and affect normal          Speech: Speech normal          Behavior: Behavior normal  Behavior is cooperative

## 2021-08-30 NOTE — PROGRESS NOTES
[facility] [address] [place] [address]    ASSESSMENT/PLAN: Triston Savage is a 65 y o   who presents for annual gynecologic exam     Encounter for routine gynecologic examination  - Routine well woman exam completed today  - Cervical Cancer Screening complete Would like pap smear today  - STI screening offered including HIV: not indicated based on hx  - Breast Cancer Screening: Last Mammogram 2021, script for next year given  - Colorectal cancer screening was not ordered  Cologuard negative 2021  - The following were reviewed in today's visit: breast self exam, mammography screening ordered, osteoporosis, adequate intake of calcium and vitamin D, exercise and age related changes  - recommend Kegel exercises, reviewed bladder irritants, to call if worsens or no improvement  - RTO 1 yr    Additional problems addressed at this visit:  1  Encounter for gynecological examination  -     Ambulatory referral to Obstetrics / Gynecology  -     Liquid-based pap, screening    2  Encounter for screening mammogram for breast cancer  -     Mammo screening bilateral w 3d & cad; Future; Expected date: 2021    3  Screening for malignant neoplasm of cervix  -     Liquid-based pap, screening    4  Prolapse of anterior lip of cervix  - very mild prolapse, not symptomatic  - reviewed pelvic floor exercises  - to call if worsens        CC:  Annual Gynecologic Examination    HPI: Triston Savage is a 65 y o  [place] who presents for annual gynecologic examination  Jose Hurst presents today for gyn exam  No vaginal bleeding since onset of menopause  Greater than 20 yrs- last pap smear, 2021 mammogram, and Cologuard   Reports 8 hrs of sleep daily, 2 servings of calcium rich foods daily, take takes 1200 mg calcium and Vitamin D daily  Exercises 3-5 days per week  1 servings of caffeine daily  Performs SBE  Concerns: possible prolapse          The following portions of the patient's history were reviewed and updated as appropriate: allergies, current medications, past family history, past medical history, obstetric history, gynecologic history, past social history, past surgical history and problem list     Review of Systems   Constitutional: Negative for activity change, appetite change, fatigue and fever  Respiratory: Negative for cough and shortness of breath  Cardiovascular: Negative for chest pain, palpitations and leg swelling  Gastrointestinal: Negative for constipation and diarrhea  Genitourinary: Negative for difficulty urinating, dysuria, frequency, urgency, vaginal bleeding, vaginal discharge and vaginal pain  Neurological: Negative for light-headedness and headaches  Psychiatric/Behavioral: The patient is not nervous/anxious  Objective:  /74 (BP Location: Right arm, Patient Position: Sitting, Cuff Size: Standard)   Ht 5' 3" (1 6 m)   Wt 52 9 kg (116 lb 9 6 oz)   LMP  (LMP Unknown)   BMI 20 65 kg/m²    Physical Exam  Vitals reviewed  Constitutional:       Appearance: Normal appearance  HENT:      Head: Normocephalic  Neck:      Thyroid: No thyroid mass or thyroid tenderness  Cardiovascular:      Rate and Rhythm: Normal rate and regular rhythm  Heart sounds: Normal heart sounds  Pulmonary:      Effort: Pulmonary effort is normal       Breath sounds: Normal breath sounds  Chest:      Breasts:         Right: No mass, nipple discharge, skin change or tenderness  Left: No mass, nipple discharge, skin change or tenderness  Abdominal:      General: There is no distension  Palpations: There is no mass  Tenderness: There is no abdominal tenderness  There is no guarding  Genitourinary:     General: Normal vulva  Exam position: Lithotomy position  Labia:         Right: No tenderness or lesion  Left: No tenderness or lesion  Vagina: No vaginal discharge, tenderness, bleeding or lesions  Cervix: No discharge, lesion, erythema or cervical bleeding  Uterus: Normal  Not enlarged and not tender  Adnexa:         Right: No mass, tenderness or fullness  Left: No mass, tenderness or fullness  Comments: Very mild prolapse of cx into vagina  Musculoskeletal:      Cervical back: Normal range of motion  Lymphadenopathy:      Upper Body:      Right upper body: No axillary adenopathy  Left upper body: No axillary adenopathy  Skin:     General: Skin is warm and dry  Neurological:      Mental Status: She is alert     Psychiatric:         Mood and Affect: Mood normal          Behavior: Behavior normal          Judgment: Judgment normal              Vicky Hernandez CNM  [facility]  08/31/21 11:09 AM

## 2021-08-31 ENCOUNTER — OFFICE VISIT (OUTPATIENT)
Dept: OBGYN CLINIC | Facility: CLINIC | Age: 71
End: 2021-08-31
Payer: MEDICARE

## 2021-08-31 VITALS
SYSTOLIC BLOOD PRESSURE: 142 MMHG | HEIGHT: 63 IN | BODY MASS INDEX: 20.66 KG/M2 | WEIGHT: 116.6 LBS | DIASTOLIC BLOOD PRESSURE: 74 MMHG

## 2021-08-31 DIAGNOSIS — Z01.419 ENCOUNTER FOR GYNECOLOGICAL EXAMINATION: Primary | ICD-10-CM

## 2021-08-31 DIAGNOSIS — Z12.31 ENCOUNTER FOR SCREENING MAMMOGRAM FOR BREAST CANCER: ICD-10-CM

## 2021-08-31 DIAGNOSIS — N81.2 PROLAPSE OF ANTERIOR LIP OF CERVIX: ICD-10-CM

## 2021-08-31 DIAGNOSIS — Z12.4 SCREENING FOR MALIGNANT NEOPLASM OF CERVIX: ICD-10-CM

## 2021-08-31 PROCEDURE — G0101 CA SCREEN;PELVIC/BREAST EXAM: HCPCS | Performed by: ADVANCED PRACTICE MIDWIFE

## 2021-08-31 PROCEDURE — G0145 SCR C/V CYTO,THINLAYER,RESCR: HCPCS | Performed by: ADVANCED PRACTICE MIDWIFE

## 2021-08-31 NOTE — PATIENT INSTRUCTIONS
Kegel Exercises for Women   AMBULATORY CARE:   Kegel exercises  help strengthen your pelvic muscles  Pelvic muscles hold your pelvic organs, such as your bladder and uterus, in place  Kegel exercises help prevent or control problems with urine incontinence (leakage)  Incontinence may be caused by pregnancy, childbirth, or menopause  Contact your healthcare provider if:   · You cannot feel your muscles tighten or relax  · You continue to leak urine  · You have questions or concerns about your condition or care  Use the correct muscles:  Pelvic muscles are the muscles you use to control urine flow  To target these muscles, stop and start the flow of urine several times  This will help you become familiar with how it feels to tighten and relax these muscles  How to do Kegel exercises:   · Empty your bladder  You may lie down, stand up, or sit down to do these exercises  When you first try to do these exercises, it may be easier if you lie down  Tighten or squeeze your pelvic muscles slowly  It may feel like you are trying to hold back urine or gas  Hold this position for 3 seconds  Relax for 3 seconds  Repeat this cycle 10 times  · Do 10 sets of Kegel exercises, at least 3 times a day  Do not hold your breath when you do Kegel exercises  Keep your stomach, back, and leg muscles relaxed  · As your muscles get stronger, you will be able to hold the squeeze longer  Your healthcare provider may ask that you increase your pelvic muscle squeeze to 10 seconds  After you squeeze for 10 seconds, relax for 10 seconds  What else you should know:   · Once you know how to do Kegel exercises, use different positions  You can do these exercises while you lie on the floor, sit at your desk or watch TV, and while you stand  · You may notice improved bladder control within about 6 weeks  · Tighten your pelvic muscles before you sneeze, cough, or lift to prevent urine leakage      Follow up with your healthcare provider as directed:  Write down your questions so you remember to ask them during your visits  © Copyright [employer] 2021 Information is for End User's use only and may not be sold, redistributed or otherwise used for commercial purposes  All illustrations and images included in CareNotes® are the copyrighted property of [employer]  or Cristine Miramontes  The above information is an  only  It is not intended as medical advice for individual conditions or treatments  Talk to your doctor, nurse or pharmacist before following any medical regimen to see if it is safe and effective for you

## 2021-09-07 LAB
LAB AP GYN PRIMARY INTERPRETATION: NORMAL
Lab: NORMAL

## 2021-10-04 ENCOUNTER — OFFICE VISIT (OUTPATIENT)
Dept: INTERNAL MEDICINE CLINIC | Facility: CLINIC | Age: 71
End: 2021-10-04
Payer: MEDICARE

## 2021-10-04 VITALS — TEMPERATURE: 98.7 F | OXYGEN SATURATION: 94 % | HEART RATE: 84 BPM

## 2021-10-04 DIAGNOSIS — J40 TRACHEOBRONCHITIS: Primary | ICD-10-CM

## 2021-10-04 DIAGNOSIS — J44.9 SEVERE CHRONIC OBSTRUCTIVE PULMONARY DISEASE (HCC): ICD-10-CM

## 2021-10-04 PROCEDURE — U0003 INFECTIOUS AGENT DETECTION BY NUCLEIC ACID (DNA OR RNA); SEVERE ACUTE RESPIRATORY SYNDROME CORONAVIRUS 2 (SARS-COV-2) (CORONAVIRUS DISEASE [COVID-19]), AMPLIFIED PROBE TECHNIQUE, MAKING USE OF HIGH THROUGHPUT TECHNOLOGIES AS DESCRIBED BY CMS-2020-01-R: HCPCS | Performed by: FAMILY MEDICINE

## 2021-10-04 PROCEDURE — U0005 INFEC AGEN DETEC AMPLI PROBE: HCPCS | Performed by: FAMILY MEDICINE

## 2021-10-04 PROCEDURE — 99213 OFFICE O/P EST LOW 20 MIN: CPT | Performed by: FAMILY MEDICINE

## 2021-10-04 RX ORDER — AZITHROMYCIN 250 MG/1
TABLET, FILM COATED ORAL
Qty: 6 TABLET | Refills: 0 | Status: SHIPPED | OUTPATIENT
Start: 2021-10-04 | End: 2021-10-09

## 2021-10-04 RX ORDER — PREDNISONE 10 MG/1
30 TABLET ORAL DAILY
Qty: 15 TABLET | Refills: 0 | Status: SHIPPED | OUTPATIENT
Start: 2021-10-04 | End: 2021-10-09

## 2021-10-04 RX ORDER — BENZONATATE 200 MG/1
200 CAPSULE ORAL 3 TIMES DAILY PRN
Qty: 30 CAPSULE | Refills: 0 | Status: SHIPPED | OUTPATIENT
Start: 2021-10-04 | End: 2022-05-23

## 2021-10-05 LAB — SARS-COV-2 RNA RESP QL NAA+PROBE: NEGATIVE

## 2021-11-15 ENCOUNTER — APPOINTMENT (OUTPATIENT)
Dept: LAB | Facility: CLINIC | Age: 71
End: 2021-11-15
Payer: MEDICARE

## 2021-11-15 DIAGNOSIS — S22.000A COMPRESSION FRACTURE OF BODY OF THORACIC VERTEBRA (HCC): ICD-10-CM

## 2021-11-15 DIAGNOSIS — E44.1 MILD PROTEIN-CALORIE MALNUTRITION (HCC): ICD-10-CM

## 2021-11-15 DIAGNOSIS — J44.9 SEVERE CHRONIC OBSTRUCTIVE PULMONARY DISEASE (HCC): ICD-10-CM

## 2021-11-15 DIAGNOSIS — R73.9 HYPERGLYCEMIA: ICD-10-CM

## 2021-11-15 DIAGNOSIS — E55.9 VITAMIN D DEFICIENCY: ICD-10-CM

## 2021-11-15 DIAGNOSIS — E78.5 HYPERLIPIDEMIA, UNSPECIFIED HYPERLIPIDEMIA TYPE: ICD-10-CM

## 2021-11-15 DIAGNOSIS — R09.89 RIGHT CAROTID BRUIT: ICD-10-CM

## 2021-11-15 DIAGNOSIS — M81.0 AGE-RELATED OSTEOPOROSIS WITHOUT CURRENT PATHOLOGICAL FRACTURE: ICD-10-CM

## 2021-11-15 LAB
25(OH)D3 SERPL-MCNC: 81.4 NG/ML (ref 30–100)
ALBUMIN SERPL BCP-MCNC: 3.8 G/DL (ref 3.5–5)
ALP SERPL-CCNC: 110 U/L (ref 46–116)
ALT SERPL W P-5'-P-CCNC: 24 U/L (ref 12–78)
ANION GAP SERPL CALCULATED.3IONS-SCNC: 4 MMOL/L (ref 4–13)
AST SERPL W P-5'-P-CCNC: 17 U/L (ref 5–45)
BASOPHILS # BLD AUTO: 0.1 THOUSANDS/ΜL (ref 0–0.1)
BASOPHILS NFR BLD AUTO: 1 % (ref 0–1)
BILIRUB SERPL-MCNC: 0.44 MG/DL (ref 0.2–1)
BUN SERPL-MCNC: 21 MG/DL (ref 5–25)
CALCIUM SERPL-MCNC: 9 MG/DL (ref 8.3–10.1)
CHLORIDE SERPL-SCNC: 107 MMOL/L (ref 100–108)
CHOLEST SERPL-MCNC: 180 MG/DL (ref 50–200)
CO2 SERPL-SCNC: 27 MMOL/L (ref 21–32)
CREAT SERPL-MCNC: 0.91 MG/DL (ref 0.6–1.3)
EOSINOPHIL # BLD AUTO: 0.42 THOUSAND/ΜL (ref 0–0.61)
EOSINOPHIL NFR BLD AUTO: 6 % (ref 0–6)
ERYTHROCYTE [DISTWIDTH] IN BLOOD BY AUTOMATED COUNT: 15.5 % (ref 11.6–15.1)
EST. AVERAGE GLUCOSE BLD GHB EST-MCNC: 114 MG/DL
GFR SERPL CREATININE-BSD FRML MDRD: 63 ML/MIN/1.73SQ M
GLUCOSE P FAST SERPL-MCNC: 83 MG/DL (ref 65–99)
HBA1C MFR BLD: 5.6 %
HCT VFR BLD AUTO: 42.3 % (ref 34.8–46.1)
HDLC SERPL-MCNC: 80 MG/DL
HGB BLD-MCNC: 13.7 G/DL (ref 11.5–15.4)
IMM GRANULOCYTES # BLD AUTO: 0.01 THOUSAND/UL (ref 0–0.2)
IMM GRANULOCYTES NFR BLD AUTO: 0 % (ref 0–2)
LDLC SERPL CALC-MCNC: 87 MG/DL (ref 0–100)
LYMPHOCYTES # BLD AUTO: 2.82 THOUSANDS/ΜL (ref 0.6–4.47)
LYMPHOCYTES NFR BLD AUTO: 40 % (ref 14–44)
MCH RBC QN AUTO: 29.5 PG (ref 26.8–34.3)
MCHC RBC AUTO-ENTMCNC: 32.4 G/DL (ref 31.4–37.4)
MCV RBC AUTO: 91 FL (ref 82–98)
MONOCYTES # BLD AUTO: 0.61 THOUSAND/ΜL (ref 0.17–1.22)
MONOCYTES NFR BLD AUTO: 9 % (ref 4–12)
NEUTROPHILS # BLD AUTO: 3.06 THOUSANDS/ΜL (ref 1.85–7.62)
NEUTS SEG NFR BLD AUTO: 44 % (ref 43–75)
NONHDLC SERPL-MCNC: 100 MG/DL
NRBC BLD AUTO-RTO: 0 /100 WBCS
PLATELET # BLD AUTO: 251 THOUSANDS/UL (ref 149–390)
PMV BLD AUTO: 9.8 FL (ref 8.9–12.7)
POTASSIUM SERPL-SCNC: 4.4 MMOL/L (ref 3.5–5.3)
PROT SERPL-MCNC: 7.7 G/DL (ref 6.4–8.2)
RBC # BLD AUTO: 4.65 MILLION/UL (ref 3.81–5.12)
SODIUM SERPL-SCNC: 138 MMOL/L (ref 136–145)
TRIGL SERPL-MCNC: 67 MG/DL
TSH SERPL DL<=0.05 MIU/L-ACNC: 2.27 UIU/ML (ref 0.36–3.74)
WBC # BLD AUTO: 7.02 THOUSAND/UL (ref 4.31–10.16)

## 2021-11-15 PROCEDURE — 80053 COMPREHEN METABOLIC PANEL: CPT

## 2021-11-15 PROCEDURE — 84443 ASSAY THYROID STIM HORMONE: CPT

## 2021-11-15 PROCEDURE — 83036 HEMOGLOBIN GLYCOSYLATED A1C: CPT

## 2021-11-15 PROCEDURE — 80061 LIPID PANEL: CPT

## 2021-11-15 PROCEDURE — 85025 COMPLETE CBC W/AUTO DIFF WBC: CPT

## 2021-11-15 PROCEDURE — 36415 COLL VENOUS BLD VENIPUNCTURE: CPT

## 2021-11-15 PROCEDURE — 82306 VITAMIN D 25 HYDROXY: CPT

## 2021-11-22 ENCOUNTER — OFFICE VISIT (OUTPATIENT)
Dept: INTERNAL MEDICINE CLINIC | Facility: CLINIC | Age: 71
End: 2021-11-22
Payer: MEDICARE

## 2021-11-22 VITALS
TEMPERATURE: 97.1 F | OXYGEN SATURATION: 95 % | HEART RATE: 69 BPM | DIASTOLIC BLOOD PRESSURE: 70 MMHG | WEIGHT: 121 LBS | BODY MASS INDEX: 21.44 KG/M2 | HEIGHT: 63 IN | SYSTOLIC BLOOD PRESSURE: 122 MMHG

## 2021-11-22 DIAGNOSIS — Z23 NEED FOR PNEUMOCOCCAL VACCINE: ICD-10-CM

## 2021-11-22 DIAGNOSIS — S22.000A COMPRESSION FRACTURE OF BODY OF THORACIC VERTEBRA (HCC): ICD-10-CM

## 2021-11-22 DIAGNOSIS — J44.9 SEVERE CHRONIC OBSTRUCTIVE PULMONARY DISEASE (HCC): Primary | ICD-10-CM

## 2021-11-22 DIAGNOSIS — E78.5 HYPERLIPIDEMIA, UNSPECIFIED HYPERLIPIDEMIA TYPE: ICD-10-CM

## 2021-11-22 DIAGNOSIS — E55.9 VITAMIN D DEFICIENCY: ICD-10-CM

## 2021-11-22 DIAGNOSIS — M81.0 AGE-RELATED OSTEOPOROSIS WITHOUT CURRENT PATHOLOGICAL FRACTURE: ICD-10-CM

## 2021-11-22 PROBLEM — J40 TRACHEOBRONCHITIS: Status: RESOLVED | Noted: 2021-10-04 | Resolved: 2021-11-22

## 2021-11-22 PROBLEM — G45.3 AMAUROSIS FUGAX: Status: RESOLVED | Noted: 2021-02-18 | Resolved: 2021-11-22

## 2021-11-22 PROBLEM — H53.9 VISION DISTURBANCE: Status: RESOLVED | Noted: 2021-02-09 | Resolved: 2021-11-22

## 2021-11-22 PROBLEM — C44.91 BASAL CELL CARCINOMA OF SKIN: Status: RESOLVED | Noted: 2020-02-03 | Resolved: 2021-11-22

## 2021-11-22 PROBLEM — H54.3 VISION LOSS, BILATERAL: Status: RESOLVED | Noted: 2021-02-09 | Resolved: 2021-11-22

## 2021-11-22 PROBLEM — G45.9 TRANSIENT ISCHEMIC ATTACK: Status: RESOLVED | Noted: 2020-10-29 | Resolved: 2021-11-22

## 2021-11-22 PROCEDURE — 90670 PCV13 VACCINE IM: CPT | Performed by: FAMILY MEDICINE

## 2021-11-22 PROCEDURE — 99214 OFFICE O/P EST MOD 30 MIN: CPT | Performed by: FAMILY MEDICINE

## 2021-11-22 PROCEDURE — G0009 ADMIN PNEUMOCOCCAL VACCINE: HCPCS | Performed by: FAMILY MEDICINE

## 2021-12-13 ENCOUNTER — OFFICE VISIT (OUTPATIENT)
Dept: URGENT CARE | Facility: CLINIC | Age: 71
End: 2021-12-13
Payer: MEDICARE

## 2021-12-13 VITALS — OXYGEN SATURATION: 93 % | TEMPERATURE: 97.7 F | RESPIRATION RATE: 20 BRPM | HEART RATE: 76 BPM

## 2021-12-13 DIAGNOSIS — J06.9 UPPER RESPIRATORY INFECTION WITH COUGH AND CONGESTION: Primary | ICD-10-CM

## 2021-12-13 PROCEDURE — 99213 OFFICE O/P EST LOW 20 MIN: CPT | Performed by: NURSE PRACTITIONER

## 2021-12-13 PROCEDURE — G0463 HOSPITAL OUTPT CLINIC VISIT: HCPCS | Performed by: NURSE PRACTITIONER

## 2021-12-13 PROCEDURE — U0005 INFEC AGEN DETEC AMPLI PROBE: HCPCS | Performed by: NURSE PRACTITIONER

## 2021-12-13 PROCEDURE — U0003 INFECTIOUS AGENT DETECTION BY NUCLEIC ACID (DNA OR RNA); SEVERE ACUTE RESPIRATORY SYNDROME CORONAVIRUS 2 (SARS-COV-2) (CORONAVIRUS DISEASE [COVID-19]), AMPLIFIED PROBE TECHNIQUE, MAKING USE OF HIGH THROUGHPUT TECHNOLOGIES AS DESCRIBED BY CMS-2020-01-R: HCPCS | Performed by: NURSE PRACTITIONER

## 2021-12-14 LAB — SARS-COV-2 RNA RESP QL NAA+PROBE: NEGATIVE

## 2021-12-15 ENCOUNTER — TELEPHONE (OUTPATIENT)
Dept: URGENT CARE | Facility: CLINIC | Age: 71
End: 2021-12-15

## 2021-12-21 ENCOUNTER — OFFICE VISIT (OUTPATIENT)
Dept: INTERNAL MEDICINE CLINIC | Facility: CLINIC | Age: 71
End: 2021-12-21
Payer: MEDICARE

## 2021-12-21 VITALS — OXYGEN SATURATION: 94 % | HEART RATE: 72 BPM | TEMPERATURE: 99.1 F

## 2021-12-21 DIAGNOSIS — J01.90 ACUTE NON-RECURRENT SINUSITIS, UNSPECIFIED LOCATION: Primary | ICD-10-CM

## 2021-12-21 DIAGNOSIS — J44.9 SEVERE CHRONIC OBSTRUCTIVE PULMONARY DISEASE (HCC): ICD-10-CM

## 2021-12-21 PROCEDURE — U0005 INFEC AGEN DETEC AMPLI PROBE: HCPCS | Performed by: FAMILY MEDICINE

## 2021-12-21 PROCEDURE — 99213 OFFICE O/P EST LOW 20 MIN: CPT | Performed by: FAMILY MEDICINE

## 2021-12-21 PROCEDURE — U0003 INFECTIOUS AGENT DETECTION BY NUCLEIC ACID (DNA OR RNA); SEVERE ACUTE RESPIRATORY SYNDROME CORONAVIRUS 2 (SARS-COV-2) (CORONAVIRUS DISEASE [COVID-19]), AMPLIFIED PROBE TECHNIQUE, MAKING USE OF HIGH THROUGHPUT TECHNOLOGIES AS DESCRIBED BY CMS-2020-01-R: HCPCS | Performed by: FAMILY MEDICINE

## 2021-12-21 RX ORDER — DOXYCYCLINE 100 MG/1
100 CAPSULE ORAL 2 TIMES DAILY
Qty: 14 CAPSULE | Refills: 0 | Status: SHIPPED | OUTPATIENT
Start: 2021-12-21 | End: 2021-12-28

## 2021-12-22 LAB — SARS-COV-2 RNA RESP QL NAA+PROBE: NEGATIVE

## 2022-01-11 NOTE — PROGRESS NOTES
[facility] [address], [place]    Assessment/Plan:  No problem-specific Assessment & Plan notes found for this encounter  Diagnoses and all orders for this visit:    Vaginal bleeding  -     US pelvis complete w transvaginal; Future    Abnormal uterine bleeding (AUB)  -     US pelvis complete w transvaginal; Future    Incompetence of rectovaginal tissue    Constipation, unspecified constipation type    - will get pelvic ultrasound and plan on EMB  - to continue treatment for constipation  - consider referral to uro-gyn for rectocele repair  - RTO for EMB when ultrasound completed  Subjective:   Mildred Chaudhari is a 66 y o  [id number] female  CC: vaginal bleeding  HPI: States that she had bright red vaginal bleeding in December off and on for 2 weeks  Noted pelvic pressure at that time  Feels that prolapse has changed  Voiding more frequently and does not always feel that she has emptied  At the time of bleeding was leaking urine with coughing and sneezing, because she also had sinus infection at that time  She was on prednisone and antibiotics at that time  Elfego Mendoza was on Doxy in Dec and urinary symptoms resolved with treatment  Elfego Mendoza has not been sexually active since September  Notes that she has chronic constipation and bulge in vagina is worse when constipated  ROS: Review of Systems   Constitutional: Negative for appetite change, chills, fatigue and fever  Respiratory: Negative for shortness of breath  Gastrointestinal: Positive for constipation  Negative for diarrhea  Genitourinary: Positive for frequency and vaginal bleeding  Negative for difficulty urinating, pelvic pain, vaginal discharge and vaginal pain         PFSH: The following portions of the patient's history were reviewed and updated as appropriate: allergies, current medications, past family history, past medical history, obstetric history, gynecologic history, past social history, past surgical history and problem list        Objective:  /76 (BP Location: Right arm, Patient Position: Sitting, Cuff Size: Standard)   Temp 98 °F (36 7 °C)   Ht 5' 3" (1 6 m)   Wt 54 6 kg (120 lb 6 4 oz)   LMP  (LMP Unknown)   BMI 21 33 kg/m²    Physical Exam  Constitutional:       Appearance: Normal appearance  Genitourinary:     General: Normal vulva  Labia:         Right: No rash, tenderness or lesion  Left: No rash, tenderness or lesion  Vagina: No vaginal discharge, erythema, tenderness, bleeding or lesions  Cervix: No discharge, friability, lesion, erythema or cervical bleeding  Comments: Stage 2 prolapse with a moderate rectocele  Increase in rectocele with valsalva  Neurological:      Mental Status: She is alert and oriented to person, place, and time     Psychiatric:         Mood and Affect: Mood normal          Behavior: Behavior normal          Judgment: Judgment normal

## 2022-01-14 ENCOUNTER — OFFICE VISIT (OUTPATIENT)
Dept: OBGYN CLINIC | Facility: CLINIC | Age: 72
End: 2022-01-14
Payer: MEDICARE

## 2022-01-14 VITALS
TEMPERATURE: 98 F | HEIGHT: 63 IN | DIASTOLIC BLOOD PRESSURE: 76 MMHG | WEIGHT: 120.4 LBS | SYSTOLIC BLOOD PRESSURE: 148 MMHG | BODY MASS INDEX: 21.33 KG/M2

## 2022-01-14 DIAGNOSIS — K59.00 CONSTIPATION, UNSPECIFIED CONSTIPATION TYPE: ICD-10-CM

## 2022-01-14 DIAGNOSIS — N93.9 ABNORMAL UTERINE BLEEDING (AUB): ICD-10-CM

## 2022-01-14 DIAGNOSIS — N93.9 VAGINAL BLEEDING: Primary | ICD-10-CM

## 2022-01-14 DIAGNOSIS — N81.83 INCOMPETENCE OF RECTOVAGINAL TISSUE: ICD-10-CM

## 2022-01-14 PROCEDURE — 99213 OFFICE O/P EST LOW 20 MIN: CPT | Performed by: ADVANCED PRACTICE MIDWIFE

## 2022-01-24 ENCOUNTER — HOSPITAL ENCOUNTER (OUTPATIENT)
Dept: ULTRASOUND IMAGING | Facility: HOSPITAL | Age: 72
Discharge: HOME | End: 2022-01-24
Payer: MEDICARE

## 2022-01-24 DIAGNOSIS — N93.9 ABNORMAL UTERINE BLEEDING (AUB): ICD-10-CM

## 2022-01-24 DIAGNOSIS — N93.9 VAGINAL BLEEDING: ICD-10-CM

## 2022-01-24 PROCEDURE — 76830 TRANSVAGINAL US NON-OB: CPT

## 2022-01-24 PROCEDURE — 76856 US EXAM PELVIC COMPLETE: CPT

## 2022-01-31 ENCOUNTER — TELEPHONE (OUTPATIENT)
Dept: OBGYN CLINIC | Facility: CLINIC | Age: 72
End: 2022-01-31

## 2022-01-31 NOTE — TELEPHONE ENCOUNTER
Reviewed u/s results with Trent Connell  She states that she has not had anymore bleeding  U/s shows a normal endometrial lining of 1 mm  She states that the prolapse has also been better  At this time Declined PT for pelvic exercises, will continue with Kegel exercises  Does not desire surgical intervention for the prolapse at this time

## 2022-05-19 ENCOUNTER — APPOINTMENT (OUTPATIENT)
Dept: LAB | Facility: CLINIC | Age: 72
End: 2022-05-19
Payer: MEDICARE

## 2022-05-19 DIAGNOSIS — E55.9 VITAMIN D DEFICIENCY: ICD-10-CM

## 2022-05-19 DIAGNOSIS — J44.9 SEVERE CHRONIC OBSTRUCTIVE PULMONARY DISEASE (HCC): ICD-10-CM

## 2022-05-19 DIAGNOSIS — E78.5 HYPERLIPIDEMIA, UNSPECIFIED HYPERLIPIDEMIA TYPE: ICD-10-CM

## 2022-05-19 LAB
25(OH)D3 SERPL-MCNC: 95 NG/ML (ref 30–100)
ALBUMIN SERPL BCP-MCNC: 3.7 G/DL (ref 3.5–5)
ALP SERPL-CCNC: 93 U/L (ref 46–116)
ALT SERPL W P-5'-P-CCNC: 26 U/L (ref 12–78)
ANION GAP SERPL CALCULATED.3IONS-SCNC: 5 MMOL/L (ref 4–13)
AST SERPL W P-5'-P-CCNC: 17 U/L (ref 5–45)
BASOPHILS # BLD AUTO: 0.08 THOUSANDS/ΜL (ref 0–0.1)
BASOPHILS NFR BLD AUTO: 1 % (ref 0–1)
BILIRUB SERPL-MCNC: 0.44 MG/DL (ref 0.2–1)
BUN SERPL-MCNC: 12 MG/DL (ref 5–25)
CALCIUM SERPL-MCNC: 9.1 MG/DL (ref 8.3–10.1)
CHLORIDE SERPL-SCNC: 106 MMOL/L (ref 100–108)
CHOLEST SERPL-MCNC: 178 MG/DL
CO2 SERPL-SCNC: 26 MMOL/L (ref 21–32)
CREAT SERPL-MCNC: 0.78 MG/DL (ref 0.6–1.3)
EOSINOPHIL # BLD AUTO: 0.46 THOUSAND/ΜL (ref 0–0.61)
EOSINOPHIL NFR BLD AUTO: 7 % (ref 0–6)
ERYTHROCYTE [DISTWIDTH] IN BLOOD BY AUTOMATED COUNT: 14.8 % (ref 11.6–15.1)
GFR SERPL CREATININE-BSD FRML MDRD: 75 ML/MIN/1.73SQ M
GLUCOSE P FAST SERPL-MCNC: 91 MG/DL (ref 65–99)
HCT VFR BLD AUTO: 41.5 % (ref 34.8–46.1)
HDLC SERPL-MCNC: 73 MG/DL
HGB BLD-MCNC: 13.9 G/DL (ref 11.5–15.4)
IMM GRANULOCYTES # BLD AUTO: 0.01 THOUSAND/UL (ref 0–0.2)
IMM GRANULOCYTES NFR BLD AUTO: 0 % (ref 0–2)
LDLC SERPL CALC-MCNC: 91 MG/DL (ref 0–100)
LYMPHOCYTES # BLD AUTO: 2.49 THOUSANDS/ΜL (ref 0.6–4.47)
LYMPHOCYTES NFR BLD AUTO: 35 % (ref 14–44)
MCH RBC QN AUTO: 30.8 PG (ref 26.8–34.3)
MCHC RBC AUTO-ENTMCNC: 33.5 G/DL (ref 31.4–37.4)
MCV RBC AUTO: 92 FL (ref 82–98)
MONOCYTES # BLD AUTO: 0.84 THOUSAND/ΜL (ref 0.17–1.22)
MONOCYTES NFR BLD AUTO: 12 % (ref 4–12)
NEUTROPHILS # BLD AUTO: 3.18 THOUSANDS/ΜL (ref 1.85–7.62)
NEUTS SEG NFR BLD AUTO: 45 % (ref 43–75)
NONHDLC SERPL-MCNC: 105 MG/DL
NRBC BLD AUTO-RTO: 0 /100 WBCS
PLATELET # BLD AUTO: 248 THOUSANDS/UL (ref 149–390)
PMV BLD AUTO: 9.4 FL (ref 8.9–12.7)
POTASSIUM SERPL-SCNC: 4.4 MMOL/L (ref 3.5–5.3)
PROT SERPL-MCNC: 7.2 G/DL (ref 6.4–8.2)
RBC # BLD AUTO: 4.52 MILLION/UL (ref 3.81–5.12)
SODIUM SERPL-SCNC: 137 MMOL/L (ref 136–145)
TRIGL SERPL-MCNC: 69 MG/DL
TSH SERPL DL<=0.05 MIU/L-ACNC: 2.12 UIU/ML (ref 0.45–4.5)
WBC # BLD AUTO: 7.06 THOUSAND/UL (ref 4.31–10.16)

## 2022-05-19 PROCEDURE — 84443 ASSAY THYROID STIM HORMONE: CPT

## 2022-05-19 PROCEDURE — 36415 COLL VENOUS BLD VENIPUNCTURE: CPT

## 2022-05-19 PROCEDURE — 80061 LIPID PANEL: CPT

## 2022-05-19 PROCEDURE — 80053 COMPREHEN METABOLIC PANEL: CPT

## 2022-05-19 PROCEDURE — 85025 COMPLETE CBC W/AUTO DIFF WBC: CPT

## 2022-05-19 PROCEDURE — 82306 VITAMIN D 25 HYDROXY: CPT

## 2022-07-05 ENCOUNTER — OFFICE VISIT (OUTPATIENT)
Dept: INTERNAL MEDICINE CLINIC | Facility: CLINIC | Age: 72
End: 2022-07-05
Payer: MEDICARE

## 2022-07-05 VITALS
BODY MASS INDEX: 20.91 KG/M2 | DIASTOLIC BLOOD PRESSURE: 74 MMHG | HEIGHT: 63 IN | HEART RATE: 58 BPM | TEMPERATURE: 97.3 F | SYSTOLIC BLOOD PRESSURE: 126 MMHG | WEIGHT: 118 LBS | OXYGEN SATURATION: 93 %

## 2022-07-05 DIAGNOSIS — J44.9 SEVERE CHRONIC OBSTRUCTIVE PULMONARY DISEASE (HCC): Primary | ICD-10-CM

## 2022-07-05 DIAGNOSIS — Z00.00 MEDICARE ANNUAL WELLNESS VISIT, SUBSEQUENT: ICD-10-CM

## 2022-07-05 DIAGNOSIS — E55.9 VITAMIN D DEFICIENCY: ICD-10-CM

## 2022-07-05 DIAGNOSIS — R09.89 RIGHT CAROTID BRUIT: ICD-10-CM

## 2022-07-05 DIAGNOSIS — M25.552 LEFT-SIDED ISCHIAL PAIN: ICD-10-CM

## 2022-07-05 DIAGNOSIS — E44.1 MILD PROTEIN-CALORIE MALNUTRITION (HCC): ICD-10-CM

## 2022-07-05 DIAGNOSIS — M81.0 AGE-RELATED OSTEOPOROSIS WITHOUT CURRENT PATHOLOGICAL FRACTURE: ICD-10-CM

## 2022-07-05 PROCEDURE — G0439 PPPS, SUBSEQ VISIT: HCPCS | Performed by: FAMILY MEDICINE

## 2022-07-05 PROCEDURE — 1123F ACP DISCUSS/DSCN MKR DOCD: CPT | Performed by: FAMILY MEDICINE

## 2022-07-05 PROCEDURE — 99214 OFFICE O/P EST MOD 30 MIN: CPT | Performed by: FAMILY MEDICINE

## 2022-07-05 NOTE — PROGRESS NOTES
Assessment/Plan:    No problem-specific Assessment & Plan notes found for this encounter  Diagnoses and all orders for this visit:    Severe chronic obstructive pulmonary disease ([address] )  -     Trelegy Ellipta 200-62 5-25 MCG/INH AEPB inhaler; Inhale 1 puff daily  -     Comprehensive metabolic panel; Future  -     Lipid panel; Future  -     CBC and differential; Future    Age-related osteoporosis without current pathological fracture  -     Comprehensive metabolic panel; Future  -     Lipid panel; Future  -     CBC and differential; Future    Mild protein-calorie malnutrition (HCC)  -     Comprehensive metabolic panel; Future  -     Lipid panel; Future  -     CBC and differential; Future    Vitamin D deficiency  -     Comprehensive metabolic panel; Future  -     Lipid panel; Future  -     CBC and differential; Future  -     Vitamin D 25 hydroxy; Future    Right carotid bruit  -     Comprehensive metabolic panel; Future  -     Lipid panel; Future  -     CBC and differential; Future        Subjective:      Patient ID: Verdis Ganser is a 66 y o  female  HPI    The following portions of the patient's history were reviewed and updated as appropriate:   She  has a past medical history of Amaurosis fugax (2/18/2021), Basal cell carcinoma of skin (2/3/2020), Clotting disorder ([address] ), COPD (chronic obstructive pulmonary disease) ([address] ), Tracheobronchitis (10/4/2021), Transient ischemic attack (10/29/2020), Vision disturbance (2/9/2021), and Vision loss, bilateral (2/9/2021)    She   Patient Active Problem List    Diagnosis Date Noted    Acute non-recurrent sinusitis 12/21/2021    Mild protein-calorie malnutrition ([address] ) 06/21/2021    Age-related osteoporosis without current pathological fracture 06/21/2021    Right carotid bruit 06/21/2021    Mild mitral regurgitation 06/21/2021    Anti-cardiolipin antibody positive 03/11/2021    Compression fracture of body of thoracic vertebra ([address] ) 02/18/2021    Ectopic beat 02/18/2021    Severe chronic obstructive pulmonary disease ([facility] [address] ) 01/25/2021    Hyperglycemia 04/27/2020    Hyperlipidemia 04/27/2020    Vitamin D deficiency 04/27/2020    Smoker 02/03/2020     She  has no past surgical history on file  Her family history includes Breast cancer in her maternal uncle; No Known Problems in her daughter, father, maternal aunt, maternal aunt, maternal grandmother, mother, paternal aunt, paternal aunt, paternal aunt, paternal grandmother, and sister  She  reports that she has been smoking cigarettes  She has been smoking about 0 50 packs per day  She has never used smokeless tobacco  She reports that she does not drink alcohol and does not use drugs  Current Outpatient Medications   Medication Sig Dispense Refill    albuterol (PROVENTIL HFA,VENTOLIN HFA) 90 mcg/act inhaler albuterol sulfate HFA 90 mcg/actuation aerosol inhaler      alendronate (Fosamax) 70 mg tablet Take 1 tablet (70 mg total) by mouth every 7 days 12 tablet 3    Ascorbic Acid (Vitamin C) 125 MG CHEW Chew 1 tablet daily      aspirin 81 mg chewable tablet Chew 81 mg daily      B Complex Vitamins (VITAMIN B COMPLEX 100 IJ)       BIOTIN PO Take 1 tablet by mouth daily      Calcium Carbonate-Vit D-Min (CALTRATE 600+D PLUS MINERALS PO) Take 1 tablet by mouth daily      Cetirizine HCl (ZYRTEC ALLERGY PO) Take 1 tablet by mouth as needed        diphenhydrAMINE (BENADRYL) 25 mg tablet Take 25 mg by mouth as needed for itching      Multiple Vitamin (MULTI-VITAMIN DAILY) TABS Take by mouth daily      Trelegy Ellipta 200-62 5-25 MCG/INH AEPB inhaler Inhale 1 puff daily 60 blister 5    VITAMIN D PO Take 1 tablet by mouth daily      vitamin E 100 UNIT capsule Take 100 Units by mouth daily      Zinc Sulfate (ZINC-220 PO) Take 1 tablet by mouth daily       No current facility-administered medications for this visit       Current Outpatient Medications on File Prior to Visit   Medication Sig    albuterol (PROVENTIL HFA,VENTOLIN HFA) 90 mcg/act inhaler albuterol sulfate HFA 90 mcg/actuation aerosol inhaler    alendronate (Fosamax) 70 mg tablet Take 1 tablet (70 mg total) by mouth every 7 days    Ascorbic Acid (Vitamin C) 125 MG CHEW Chew 1 tablet daily    aspirin 81 mg chewable tablet Chew 81 mg daily    B Complex Vitamins (VITAMIN B COMPLEX 100 IJ)     BIOTIN PO Take 1 tablet by mouth daily    Calcium Carbonate-Vit D-Min (CALTRATE 600+D PLUS MINERALS PO) Take 1 tablet by mouth daily    Cetirizine HCl (ZYRTEC ALLERGY PO) Take 1 tablet by mouth as needed      diphenhydrAMINE (BENADRYL) 25 mg tablet Take 25 mg by mouth as needed for itching    Multiple Vitamin (MULTI-VITAMIN DAILY) TABS Take by mouth daily    VITAMIN D PO Take 1 tablet by mouth daily    vitamin E 100 UNIT capsule Take 100 Units by mouth daily    Zinc Sulfate (ZINC-220 PO) Take 1 tablet by mouth daily    [DISCONTINUED] Trelegy Ellipta 200-62 5-25 MCG/INH AEPB inhaler      No current facility-administered medications on file prior to visit  She has No Known Allergies       Review of Systems      Objective:      /74 (BP Location: Left arm, Patient Position: Sitting, Cuff Size: Standard)   Pulse 58   Temp (!) 97 3 °F (36 3 °C)   Ht 5' 3" (1 6 m)   Wt 53 5 kg (118 lb)   LMP  (LMP Unknown)   SpO2 93%   BMI 20 90 kg/m²          Physical Exam

## 2022-07-05 NOTE — PATIENT INSTRUCTIONS
Medicare Preventive Visit Patient Instructions  Thank you for completing your Welcome to Medicare Visit or Medicare Annual Wellness Visit today  Your next wellness visit will be due in one year (7/6/2023)  The screening/preventive services that you may require over the next 5-10 years are detailed below  Some tests may not apply to you based off risk factors and/or age  Screening tests ordered at today's visit but not completed yet may show as past due  Also, please note that scanned in results may not display below  Preventive Screenings:  Service Recommendations Previous Testing/Comments   Colorectal Cancer Screening  * Colonoscopy    * Fecal Occult Blood Test (FOBT)/Fecal Immunochemical Test (FIT)  * Fecal DNA/Cologuard Test  * Flexible Sigmoidoscopy Age: 54-65 years old   Colonoscopy: every 10 years (may be performed more frequently if at higher risk)  OR  FOBT/FIT: every 1 year  OR  Cologuard: every 3 years  OR  Sigmoidoscopy: every 5 years  Screening may be recommended earlier than age 48 if at higher risk for colorectal cancer  Also, an individualized decision between you and your healthcare provider will decide whether screening between the ages of 74-80 would be appropriate  Colonoscopy: Not on file  FOBT/FIT: Not on file  Cologuard: 07/19/2021  Sigmoidoscopy: Not on file    Screening Current     Breast Cancer Screening Age: 36 years old  Frequency: every 1-2 years  Not required if history of left and right mastectomy Mammogram: 04/19/2021    Screening Current   Cervical Cancer Screening Between the ages of 21-29, pap smear recommended once every 3 years  Between the ages of 33-67, can perform pap smear with HPV co-testing every 5 years     Recommendations may differ for women with a history of total hysterectomy, cervical cancer, or abnormal pap smears in past  Pap Smear: 08/31/2021    Screening Not Indicated   Hepatitis C Screening Once for adults born between 1945 and 1965  More frequently in patients at high risk for Hepatitis C Hep C Antibody: Not on file        Diabetes Screening 1-2 times per year if you're at risk for diabetes or have pre-diabetes Fasting glucose: 91 mg/dL   A1C: 5 6 %    Screening Current   Cholesterol Screening Once every 5 years if you don't have a lipid disorder  May order more often based on risk factors  Lipid panel: 05/19/2022    Screening Not Indicated  History Lipid Disorder     Other Preventive Screenings Covered by Medicare:  1  Abdominal Aortic Aneurysm (AAA) Screening: covered once if your at risk  You're considered to be at risk if you have a family history of AAA  2  Lung Cancer Screening: covers low dose CT scan once per year if you meet all of the following conditions: (1) Age 50-69; (2) No signs or symptoms of lung cancer; (3) Current smoker or have quit smoking within the last 15 years; (4) You have a tobacco smoking history of at least 30 pack years (packs per day multiplied by number of years you smoked); (5) You get a written order from a healthcare provider  3  Glaucoma Screening: covered annually if you're considered high risk: (1) You have diabetes OR (2) Family history of glaucoma OR (3)  aged 48 and older OR (3)  American aged 72 and older  3  Osteoporosis Screening: covered every 2 years if you meet one of the following conditions: (1) You're estrogen deficient and at risk for osteoporosis based off medical history and other findings; (2) Have a vertebral abnormality; (3) On glucocorticoid therapy for more than 3 months; (4) Have primary hyperparathyroidism; (5) On osteoporosis medications and need to assess response to drug therapy  · Last bone density test (DXA Scan): 04/19/2021   5  HIV Screening: covered annually if you're between the age of 15-65  Also covered annually if you are younger than 13 and older than 72 with risk factors for HIV infection   For pregnant patients, it is covered up to 3 times per pregnancy  Immunizations:  Immunization Recommendations   Influenza Vaccine Annual influenza vaccination during flu season is recommended for all persons aged >= 6 months who do not have contraindications   Pneumococcal Vaccine (Prevnar and Pneumovax)  * Prevnar = PCV13  * Pneumovax = PPSV23   Adults 25-60 years old: 1-3 doses may be recommended based on certain risk factors  Adults 72 years old: Prevnar (PCV13) vaccine recommended followed by Pneumovax (PPSV23) vaccine  If already received PPSV23 since turning 65, then PCV13 recommended at least one year after PPSV23 dose  Hepatitis B Vaccine 3 dose series if at intermediate or high risk (ex: diabetes, end stage renal disease, liver disease)   Tetanus (Td) Vaccine - COST NOT COVERED BY MEDICARE PART B Following completion of primary series, a booster dose should be given every 10 years to maintain immunity against tetanus  Td may also be given as tetanus wound prophylaxis  Tdap Vaccine - COST NOT COVERED BY MEDICARE PART B Recommended at least once for all adults  For pregnant patients, recommended with each pregnancy  Shingles Vaccine (Shingrix) - COST NOT COVERED BY MEDICARE PART B  2 shot series recommended in those aged 48 and above     Health Maintenance Due:      Topic Date Due    Hepatitis C Screening  Never done    Breast Cancer Screening: Mammogram  04/19/2023    Colorectal Cancer Screening  07/19/2024     Immunizations Due:      Topic Date Due    DTaP,Tdap,and Td Vaccines (1 - Tdap) Never done    COVID-19 Vaccine (3 - Booster for Moderna series) 09/14/2021    Influenza Vaccine (1) 09/01/2022     Advance Directives   What are advance directives? Advance directives are legal documents that state your wishes and plans for medical care  These plans are made ahead of time in case you lose your ability to make decisions for yourself   Advance directives can apply to any medical decision, such as the treatments you want, and if you want to donate organs  What are the types of advance directives? There are many types of advance directives, and each state has rules about how to use them  You may choose a combination of any of the following:  · Living will: This is a written record of the treatment you want  You can also choose which treatments you do not want, which to limit, and which to stop at a certain time  This includes surgery, medicine, IV fluid, and tube feedings  · Durable power of  for healthcare [facility]): This is a written record that states who you want to make healthcare choices for you when you are unable to make them for yourself  This person, called a proxy, is usually a family member or a friend  You may choose more than 1 proxy  · Do not resuscitate (DNR) order:  A DNR order is used in case your heart stops beating or you stop breathing  It is a request not to have certain forms of treatment, such as CPR  A DNR order may be included in other types of advance directives  · Medical directive: This covers the care that you want if you are in a coma, near death, or unable to make decisions for yourself  You can list the treatments you want for each condition  Treatment may include pain medicine, surgery, blood transfusions, dialysis, IV or tube feedings, and a ventilator (breathing machine)  · Values history: This document has questions about your views, beliefs, and how you feel and think about life  This information can help others choose the care that you would choose  Why are advance directives important? An advance directive helps you control your care  Although spoken wishes may be used, it is better to have your wishes written down  Spoken wishes can be misunderstood, or not followed  Treatments may be given even if you do not want them  An advance directive may make it easier for your family to make difficult choices about your care     Cigarette Smoking and Your Health   Risks to your health if you smoke:  Nicotine and other chemicals found in tobacco damage every cell in your body  Even if you are a light smoker, you have an increased risk for cancer, heart disease, and lung disease  If you are pregnant or have diabetes, smoking increases your risk for complications  Benefits to your health if you stop smoking:   · You decrease respiratory symptoms such as coughing, wheezing, and shortness of breath  · You reduce your risk for cancers of the lung, mouth, throat, kidney, bladder, pancreas, stomach, and cervix  If you already have cancer, you increase the benefits of chemotherapy  You also reduce your risk for cancer returning or a second cancer from developing  · You reduce your risk for heart disease, blood clots, heart attack, and stroke  · You reduce your risk for lung infections, and diseases such as pneumonia, asthma, chronic bronchitis, and emphysema  · Your circulation improves  More oxygen can be delivered to your body  If you have diabetes, you lower your risk for complications, such as kidney, artery, and eye diseases  You also lower your risk for nerve damage  Nerve damage can lead to amputations, poor vision, and blindness  · You improve your body's ability to heal and to fight infections  For more information and support to stop smoking:   · Smokefree  gov  Phone: 6- 678 - 943-4839  Web Address: www [employer]  [address] [place] [address] 2018 Information is for End User's use only and may not be sold, redistributed or otherwise used for commercial purposes   All illustrations and images included in CareNotes® are the copyrighted property of [employer]  or [address] [place] [address]

## 2022-07-05 NOTE — PROGRESS NOTES
Assessment and Plan:     Problem List Items Addressed This Visit        Respiratory    Severe chronic obstructive pulmonary disease ([address] ) - Primary    Relevant Medications    Trelegy Ellipta 200-62 5-25 MCG/INH AEPB inhaler    Other Relevant Orders    Comprehensive metabolic panel    Lipid panel    CBC and differential       Musculoskeletal and Integument    Age-related osteoporosis without current pathological fracture    Relevant Orders    Comprehensive metabolic panel    Lipid panel    CBC and differential       Other    Vitamin D deficiency    Relevant Orders    Comprehensive metabolic panel    Lipid panel    CBC and differential    Vitamin D 25 hydroxy    Mild protein-calorie malnutrition (HCC)    Relevant Orders    Comprehensive metabolic panel    Lipid panel    CBC and differential    Right carotid bruit    Relevant Orders    Comprehensive metabolic panel    Lipid panel    CBC and differential      Other Visit Diagnoses     Medicare annual wellness visit, subsequent        Left-sided ischial pain        Relevant Orders    XR hip/pelv 4+ vw left if performed        Orders and recommendations as noted above  Discussed the left ischial pain with her  This seems to be likely related to her hamstring but with her history of the osteoporosis, she should consider getting an x-ray if the symptoms persist or worsen  Discussed the risk for stress fractures  Discussed trying heat to the area to see if this is affective  Gentle nonweightbearing stretching exercises discussed  COPD symptoms relatively stable  Continue with the Trelegy on a daily basis and the albuterol if needed  Watch for any worsening  Recommend flu shot in the fall  Weight on the lower side but stable  Try to increase protein intake  There is a slip for carotid Dopplers in her chart  Recommend mammograms yearly  Recommend bone densities every other year  Continue with the Fosamax for the osteoporosis    Continue with calcium and vitamin-D supplementation  Be very careful to avoid falls  Will have her follow up in about 3-6 months or sooner if needed  Previous laboratory testing reviewed with her  Vitamin-D level at the top limits of goal   Advised her to hold her vitamin-D 1 day a week  Depression Screening and Follow-up Plan: Patient was screened for depression during today's encounter  They screened negative with a PHQ-2 score of 0  Preventive health issues were discussed with patient, and age appropriate screening tests were ordered as noted in patient's After Visit Summary  Personalized health advice and appropriate referrals for health education or preventive services given if needed, as noted in patient's After Visit Summary  History of Present Illness:     Patient presents for a Medicare Wellness Visit    She presents for routine follow-up as well as Medicare wellness visit  Has generally been doing relatively well  She has had some pain into the left buttocks area and left proximal, posterior thigh  Denies any injuries  Much worse when trying to go up stairs  Appetite has been generally stable  States that she eats well  Weight has been relatively stable  Continues with the Trelegy and the albuterol  Denies any significant shortness of breath at present  Tolerating her Fosamax well  Denies any GI side effects from this  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  This can be stressful at times  Denies any recent respiratory illnesses  Denies any nausea, vomiting, or diarrhea  Patient Care Team:  Pawan Gonzalez MD as PCP - General (Family Medicine)  Nina Roman CNM (Midwifery)     Review of Systems:     Review of Systems   Constitutional: Positive for fatigue  Negative for activity change, appetite change, chills and fever  HENT: Negative for congestion and rhinorrhea  Eyes: Negative for visual disturbance     Respiratory: Negative for cough, chest tightness and shortness of breath  Cardiovascular: Negative for chest pain and palpitations  Gastrointestinal: Negative for abdominal pain, blood in stool, diarrhea, nausea and vomiting  Endocrine: Negative for polydipsia, polyphagia and polyuria  Genitourinary: Negative for dysuria, frequency and urgency  Musculoskeletal: Positive for gait problem and myalgias  Skin: Negative for color change  Neurological: Negative for dizziness and headaches  Hematological: Does not bruise/bleed easily  Psychiatric/Behavioral: Negative for confusion and sleep disturbance  The patient is not nervous/anxious  Problem List:     Patient Active Problem List   Diagnosis    Hyperglycemia    Hyperlipidemia    Severe chronic obstructive [facility] disease [facility]    Smoker    Vitamin D deficiency    Compression fracture of body of thoracic vertebra (HCC)    Ectopic beat    Anti-cardiolipin antibody positive    Mild protein-calorie malnutrition (HCC)    Age-related osteoporosis without current pathological fracture    Right carotid bruit    Mild mitral regurgitation    Acute non-recurrent sinusitis      Past Medical and Surgical History:     Past Medical History:   Diagnosis Date    Amaurosis fugax 2/18/2021    Basal cell carcinoma of skin 2/3/2020    [facility]     COPD (chronic obstructive pulmonary disease) ([facility] )     Tracheobronchitis 10/4/2021    Transient ischemic attack 10/29/2020    Vision disturbance 2/9/2021    Vision loss, bilateral 2/9/2021     History reviewed  No pertinent surgical history     Family History:     Family History   Problem Relation Age of Onset    No Known Problems Mother     No Known Problems Father     No Known Problems Sister     No Known Problems Daughter     No Known Problems Maternal Grandmother     No Known Problems Paternal Grandmother     Breast cancer Maternal Uncle     No Known Problems Maternal Aunt     No Known Problems Maternal Aunt     No Known Problems Paternal Aunt     No Known Problems Paternal Aunt     No Known Problems Paternal Aunt       Social History:     Social History     Socioeconomic History    Marital status:       Spouse name: None    Number of children: None    Years of education: None    Highest education level: None   Occupational History    None   Tobacco Use    Smoking status: Current Every Day Smoker     Packs/day: 0 50     Types: Cigarettes    Smokeless tobacco: Never Used   Vaping Use    Vaping Use: Never used   Substance and Sexual Activity    Alcohol use: No    Drug use: No    Sexual activity: Yes     Partners: Male     Birth control/protection: Post-menopausal   Other Topics Concern    None   Social History Narrative    None     Social Determinants of Health     Financial Resource Strain: Not on file   Food Insecurity: Not on file   Transportation Needs: Not on file   Physical Activity: Not on file   Stress: Not on file   Social Connections: Not on file   Intimate Partner Violence: Not on file   Housing Stability: Not on file      Medications and Allergies:     Current Outpatient Medications   Medication Sig Dispense Refill    albuterol (PROVENTIL HFA,VENTOLIN HFA) 90 mcg/act inhaler albuterol sulfate HFA 90 mcg/actuation aerosol inhaler      alendronate (Fosamax) 70 mg tablet Take 1 tablet (70 mg total) by mouth every 7 days 12 tablet 3    Ascorbic Acid (Vitamin C) 125 MG CHEW Chew 1 tablet daily      aspirin 81 mg chewable tablet Chew 81 mg daily      B Complex Vitamins (VITAMIN B COMPLEX 100 IJ)       BIOTIN PO Take 1 tablet by mouth daily      Calcium Carbonate-Vit D-Min (CALTRATE 600+D PLUS MINERALS PO) Take 1 tablet by mouth daily      Cetirizine HCl (ZYRTEC ALLERGY PO) Take 1 tablet by mouth as needed        diphenhydrAMINE (BENADRYL) 25 mg tablet Take 25 mg by mouth as needed for itching      Multiple Vitamin (MULTI-VITAMIN DAILY) TABS Take by mouth daily      Trelegy Ellipta 200-62 5-25 MCG/INH AEPB inhaler Inhale 1 puff daily 60 blister 5    VITAMIN D PO Take 1 tablet by mouth daily      vitamin E 100 UNIT capsule Take 100 Units by mouth daily      Zinc Sulfate (ZINC-220 PO) Take 1 tablet by mouth daily       No current facility-administered medications for this visit  No Known Allergies   Immunizations:     Immunization History   Administered Date(s) Administered    COVID-19 MODERNA VACC 0 5 ML IM 03/15/2021, 04/14/2021    Pneumococcal Conjugate 13-Valent 11/22/2021    Pneumococcal Polysaccharide PPV23 12/25/2014      Health Maintenance:         Topic Date Due    Hepatitis C Screening  Never done    Breast Cancer Screening: Mammogram  04/19/2023    Colorectal Cancer Screening  07/19/2024         Topic Date Due    DTaP,Tdap,and Td Vaccines (1 - Tdap) Never done    COVID-19 Vaccine (3 - Booster for Moderna series) 09/14/2021    Influenza Vaccine (1) 09/01/2022      Medicare Screening Tests and Risk Assessments:     Fernanda Garcia is here for her Subsequent Wellness visit  Last Medicare Wellness visit information reviewed, patient interviewed and updates made to the record today  Health Risk Assessment:   Patient rates overall health as very good  Patient feels that their physical health rating is same  Patient is satisfied with their life  Eyesight was rated as same  Hearing was rated as slightly worse  Patient feels that their emotional and mental health rating is same  Patients states they are never, rarely angry  Patient states they are sometimes unusually tired/fatigued  Pain experienced in the last 7 days has been none  Patient states that she has experienced no weight loss or gain in last 6 months  Depression Screening:   PHQ-2 Score: 0      Fall Risk Screening: In the past year, patient has experienced: no history of falling in past year      Urinary Incontinence Screening:   Patient has not leaked urine accidently in the last six months       Home Safety:  Patient does not have trouble with stairs inside or outside of their home  Patient has working smoke alarms and has working carbon monoxide detector  Home safety hazards include: none  Nutrition:   Current diet is Regular  Medications:   Patient is currently taking over-the-counter supplements  OTC medications include: see medication list  Patient is able to manage medications  Activities of Daily Living (ADLs)/Instrumental Activities of Daily Living (IADLs):   Walk and transfer into and out of bed and chair?: Yes  Dress and groom yourself?: Yes    Bathe or shower yourself?: Yes    Feed yourself? Yes  Do your laundry/housekeeping?: Yes  Manage your money, pay your bills and track your expenses?: Yes  Make your own meals?: Yes    Do your own shopping?: Yes    Previous Hospitalizations:   Any hospitalizations or ED visits within the last 12 months?: No      Advance Care Planning:   Living will: Yes    Durable POA for healthcare:  Yes    Advanced directive: Yes    Provider agrees with end of life decisions: Yes      Cognitive Screening:   Provider or family/friend/caregiver concerned regarding cognition?: No    PREVENTIVE SCREENINGS      Cardiovascular Screening:    General: Screening Not Indicated and History Lipid Disorder      Diabetes Screening:     General: Screening Current      Colorectal Cancer Screening:     General: Screening Current      Breast Cancer Screening:     General: Screening Current      Cervical Cancer Screening:    General: Screening Not Indicated      Osteoporosis Screening:    General: Screening Not Indicated and History Osteoporosis      Abdominal Aortic Aneurysm (AAA) Screening:        General: Screening Not Indicated      Lung Cancer Screening:     General: Screening Not Indicated      Hepatitis C Screening:    General: Screening Not Indicated    Screening, Brief Intervention, and Referral to Treatment (SBIRT)    Screening  Typical number of drinks in a day: 0  Typical number of drinks in a week: 0  Interpretation: Low risk drinking behavior  Single Item Drug Screening:  How often have you used an illegal drug (including marijuana) or a prescription medication for non-medical reasons in the past year? never    Single Item Drug Screen Score: 0  Interpretation: Negative screen for possible drug use disorder    Brief Intervention  Alcohol & drug use screenings were reviewed  No concerns regarding substance use disorder identified  No exam data present     Physical Exam:     /74 (BP Location: Left arm, Patient Position: Sitting, Cuff Size: Standard)   Pulse 58   Temp (!) 97 3 °F (36 3 °C)   Ht 5' 3" (1 6 m)   Wt 53 5 kg (118 lb)   LMP  (LMP Unknown)   SpO2 93%   BMI 20 90 kg/m²     Physical Exam  Vitals and nursing note reviewed  Constitutional:       General: She is not in acute distress  Appearance: She is well-developed and well-groomed  HENT:      Head: Normocephalic and atraumatic  Eyes:      Conjunctiva/sclera: Conjunctivae normal    Cardiovascular:      Rate and Rhythm: Normal rate and regular rhythm  Heart sounds: No murmur heard  Pulmonary:      Effort: Pulmonary effort is normal  No respiratory distress  Breath sounds: Decreased breath sounds present  Abdominal:      Palpations: Abdomen is soft  Tenderness: There is no abdominal tenderness  Musculoskeletal:      Cervical back: Neck supple  Skin:     General: Skin is warm and dry  Neurological:      Mental Status: She is alert  Psychiatric:         Behavior: Behavior is cooperative            Mary Stock MD

## 2022-08-29 DIAGNOSIS — M81.0 AGE-RELATED OSTEOPOROSIS WITHOUT CURRENT PATHOLOGICAL FRACTURE: ICD-10-CM

## 2022-08-29 DIAGNOSIS — S22.000A COMPRESSION FRACTURE OF BODY OF THORACIC VERTEBRA (HCC): ICD-10-CM

## 2022-08-29 RX ORDER — ALENDRONATE SODIUM 70 MG/1
70 TABLET ORAL
Qty: 12 TABLET | Refills: 3 | Status: SHIPPED | OUTPATIENT
Start: 2022-08-29

## 2022-10-12 PROBLEM — J01.90 ACUTE NON-RECURRENT SINUSITIS: Status: RESOLVED | Noted: 2021-12-21 | Resolved: 2022-10-12

## 2023-01-03 ENCOUNTER — APPOINTMENT (OUTPATIENT)
Dept: LAB | Facility: CLINIC | Age: 73
End: 2023-01-03

## 2023-01-03 DIAGNOSIS — R09.89 RIGHT CAROTID BRUIT: ICD-10-CM

## 2023-01-03 DIAGNOSIS — E55.9 VITAMIN D DEFICIENCY: ICD-10-CM

## 2023-01-03 DIAGNOSIS — J44.9 SEVERE CHRONIC OBSTRUCTIVE PULMONARY DISEASE (HCC): ICD-10-CM

## 2023-01-03 DIAGNOSIS — M81.0 AGE-RELATED OSTEOPOROSIS WITHOUT CURRENT PATHOLOGICAL FRACTURE: ICD-10-CM

## 2023-01-03 DIAGNOSIS — E44.1 MILD PROTEIN-CALORIE MALNUTRITION (HCC): ICD-10-CM

## 2023-01-03 LAB
BASOPHILS # BLD AUTO: 0.12 THOUSANDS/ÂΜL (ref 0–0.1)
BASOPHILS NFR BLD AUTO: 2 % (ref 0–1)
EOSINOPHIL # BLD AUTO: 0.5 THOUSAND/ÂΜL (ref 0–0.61)
EOSINOPHIL NFR BLD AUTO: 7 % (ref 0–6)
ERYTHROCYTE [DISTWIDTH] IN BLOOD BY AUTOMATED COUNT: 14.7 % (ref 11.6–15.1)
HCT VFR BLD AUTO: 41.2 % (ref 34.8–46.1)
HGB BLD-MCNC: 13.4 G/DL (ref 11.5–15.4)
IMM GRANULOCYTES # BLD AUTO: 0.02 THOUSAND/UL (ref 0–0.2)
IMM GRANULOCYTES NFR BLD AUTO: 0 % (ref 0–2)
LYMPHOCYTES # BLD AUTO: 2.78 THOUSANDS/ÂΜL (ref 0.6–4.47)
LYMPHOCYTES NFR BLD AUTO: 37 % (ref 14–44)
MCH RBC QN AUTO: 30.5 PG (ref 26.8–34.3)
MCHC RBC AUTO-ENTMCNC: 32.5 G/DL (ref 31.4–37.4)
MCV RBC AUTO: 94 FL (ref 82–98)
MONOCYTES # BLD AUTO: 0.82 THOUSAND/ÂΜL (ref 0.17–1.22)
MONOCYTES NFR BLD AUTO: 11 % (ref 4–12)
NEUTROPHILS # BLD AUTO: 3.3 THOUSANDS/ÂΜL (ref 1.85–7.62)
NEUTS SEG NFR BLD AUTO: 43 % (ref 43–75)
NRBC BLD AUTO-RTO: 0 /100 WBCS
PLATELET # BLD AUTO: 251 THOUSANDS/UL (ref 149–390)
PMV BLD AUTO: 10.3 FL (ref 8.9–12.7)
RBC # BLD AUTO: 4.4 MILLION/UL (ref 3.81–5.12)
WBC # BLD AUTO: 7.54 THOUSAND/UL (ref 4.31–10.16)

## 2023-01-04 LAB
25(OH)D3 SERPL-MCNC: 72.8 NG/ML (ref 30–100)
ALBUMIN SERPL BCP-MCNC: 3.6 G/DL (ref 3.5–5)
ALP SERPL-CCNC: 88 U/L (ref 46–116)
ALT SERPL W P-5'-P-CCNC: 22 U/L (ref 12–78)
ANION GAP SERPL CALCULATED.3IONS-SCNC: 7 MMOL/L (ref 4–13)
AST SERPL W P-5'-P-CCNC: 17 U/L (ref 5–45)
BILIRUB SERPL-MCNC: 0.35 MG/DL (ref 0.2–1)
BUN SERPL-MCNC: 26 MG/DL (ref 5–25)
CALCIUM SERPL-MCNC: 9.2 MG/DL (ref 8.3–10.1)
CHLORIDE SERPL-SCNC: 111 MMOL/L (ref 96–108)
CHOLEST SERPL-MCNC: 165 MG/DL
CO2 SERPL-SCNC: 24 MMOL/L (ref 21–32)
CREAT SERPL-MCNC: 0.77 MG/DL (ref 0.6–1.3)
GFR SERPL CREATININE-BSD FRML MDRD: 76 ML/MIN/1.73SQ M
GLUCOSE P FAST SERPL-MCNC: 77 MG/DL (ref 65–99)
HDLC SERPL-MCNC: 72 MG/DL
LDLC SERPL CALC-MCNC: 81 MG/DL (ref 0–100)
NONHDLC SERPL-MCNC: 93 MG/DL
POTASSIUM SERPL-SCNC: 4.4 MMOL/L (ref 3.5–5.3)
PROT SERPL-MCNC: 7.1 G/DL (ref 6.4–8.4)
SODIUM SERPL-SCNC: 142 MMOL/L (ref 135–147)
TRIGL SERPL-MCNC: 58 MG/DL

## 2023-01-26 ENCOUNTER — OFFICE VISIT (OUTPATIENT)
Dept: INTERNAL MEDICINE CLINIC | Facility: CLINIC | Age: 73
End: 2023-01-26

## 2023-01-26 VITALS
WEIGHT: 118.9 LBS | HEIGHT: 63 IN | BODY MASS INDEX: 21.07 KG/M2 | DIASTOLIC BLOOD PRESSURE: 78 MMHG | HEART RATE: 62 BPM | SYSTOLIC BLOOD PRESSURE: 126 MMHG | OXYGEN SATURATION: 94 % | TEMPERATURE: 97.3 F

## 2023-01-26 DIAGNOSIS — E78.5 HYPERLIPIDEMIA, UNSPECIFIED HYPERLIPIDEMIA TYPE: ICD-10-CM

## 2023-01-26 DIAGNOSIS — E55.9 VITAMIN D DEFICIENCY: ICD-10-CM

## 2023-01-26 DIAGNOSIS — M81.0 AGE-RELATED OSTEOPOROSIS WITHOUT CURRENT PATHOLOGICAL FRACTURE: ICD-10-CM

## 2023-01-26 DIAGNOSIS — M65.331 TRIGGER MIDDLE FINGER OF RIGHT HAND: ICD-10-CM

## 2023-01-26 DIAGNOSIS — Z12.31 VISIT FOR SCREENING MAMMOGRAM: ICD-10-CM

## 2023-01-26 DIAGNOSIS — J44.9 SEVERE CHRONIC OBSTRUCTIVE PULMONARY DISEASE (HCC): Primary | ICD-10-CM

## 2023-01-26 DIAGNOSIS — E44.1 MILD PROTEIN-CALORIE MALNUTRITION (HCC): ICD-10-CM

## 2023-01-26 DIAGNOSIS — N81.4 UTERINE PROLAPSE: ICD-10-CM

## 2023-01-27 NOTE — PROGRESS NOTES
Assessment/Plan:    No problem-specific Assessment & Plan notes found for this encounter  Diagnoses and all orders for this visit:    Severe chronic obstructive pulmonary disease (HCC)  -     CBC and differential; Future    Age-related osteoporosis without current pathological fracture  -     CBC and differential; Future    Vitamin D deficiency  -     Vitamin D 25 hydroxy; Future    Trigger middle finger of right hand  -     Ambulatory Referral to Hand Surgery; Future    Uterine prolapse  -     Ambulatory Referral to Gynecology; Future    Hyperlipidemia, unspecified hyperlipidemia type  -     CBC and differential; Future  -     Comprehensive metabolic panel; Future  -     Lipid panel; Future  -     TSH, 3rd generation; Future    Visit for screening mammogram  -     Mammo screening bilateral w 3d & cad; Future    Mild protein-calorie malnutrition ([address] )    Other orders  -     Multiple Vitamins-Minerals (HAIR SKIN NAILS PO); Take by mouth   Orders and recommendations as noted above  Recent issues with her right third finger discussed  We will refer her to a hand specialist for further investigation and treatment  Referral placed for her to follow-up again with GYN regarding the uterine prolapse  Options regarding this discussed  Continue with the Fosamax  Continue with bone densities every 2 years  Be very careful to avoid falls  Continue with calcium and vitamin D supplementation  Watch for any worsening of respiratory symptoms  Watch for any respiratory illnesses  Weight is stable but on the low side  Continue to eat regularly which she does  Up-to-date on pneumonia vaccinations  Does not typically get flu shot  COVID booster recommended  We will have her follow-up in about 6 months or sooner if needed  Subjective:      Patient ID: Tiffanie Miller is a 74 y o  female  She presents for routine follow-up  Has generally been doing overall well    Has had some cold symptoms on and off but not into her chest   Breathing has been relatively stable  Denies any significant shortness of breath at present  Tolerating the Fosamax without difficulty  No recent falls  Continues with calcium and vitamin D supplementation  Has been having some issues with the uterine prolapse  Is planning to follow-up with GYN regarding this again  Has been having some pain and some locking into her right third finger  Is painful at times especially at the base of the finger  Appetite has been generally stable  Eats well  Denies any nausea, vomiting, or diarrhea  Continues to work  The following portions of the patient's history were reviewed and updated as appropriate:   She  has a past medical history of Amaurosis fugax (2/18/2021), Basal cell carcinoma of skin (2/3/2020), Clotting disorder ([address] ), COPD (chronic obstructive pulmonary disease) ([facility] ), Tracheobronchitis (10/4/2021), Transient ischemic attack (10/29/2020), Vision disturbance (2/9/2021), and Vision loss, bilateral (2/9/2021)  She   Patient Active Problem List    Diagnosis Date Noted   • Trigger middle finger of right hand 01/26/2023   • Uterine prolapse 01/26/2023   • Mild protein-calorie malnutrition ([address] ) 06/21/2021   • Age-related osteoporosis without current pathological fracture 06/21/2021   • Right carotid bruit 06/21/2021   • Mild mitral regurgitation 06/21/2021   • Anti-cardiolipin antibody positive 03/11/2021   • Compression fracture of body of thoracic vertebra ([facility] ) 02/18/2021   • Ectopic beat 02/18/2021   • Severe chronic obstructive pulmonary disease ([facility] ) 01/25/2021   • Hyperglycemia 04/27/2020   • Hyperlipidemia 04/27/2020   • Vitamin D deficiency 04/27/2020   • Smoker 02/03/2020     She  has no past surgical history on file  Her family history includes Breast cancer in her maternal uncle;  No Known Problems in her daughter, father, maternal aunt, maternal aunt, maternal grandmother, mother, paternal aunt, paternal aunt, paternal aunt, paternal grandmother, and sister  She  reports that she has been smoking cigarettes  She has been smoking an average of  5 packs per day  She has never used smokeless tobacco  She reports that she does not drink alcohol and does not use drugs  Current Outpatient Medications   Medication Sig Dispense Refill   • alendronate (Fosamax) 70 mg tablet Take 1 tablet (70 mg total) by mouth every 7 days 12 tablet 3   • Ascorbic Acid (Vitamin C) 125 MG CHEW Chew 1 tablet daily     • aspirin 81 mg chewable tablet Chew 81 mg daily     • B Complex Vitamins (VITAMIN B COMPLEX 100 IJ)      • Calcium Carbonate-Vit D-Min (CALTRATE 600+D PLUS MINERALS PO) Take 1 tablet by mouth daily     • Cetirizine HCl (ZYRTEC ALLERGY PO) Take 1 tablet by mouth as needed       • Multiple Vitamin (MULTI-VITAMIN DAILY) TABS Take by mouth daily     • Multiple Vitamins-Minerals (HAIR SKIN NAILS PO) Take by mouth     • Trelegy Ellipta 200-62 5-25 MCG/INH AEPB inhaler Inhale 1 puff daily 60 blister 5   • VITAMIN D PO Take 1 tablet by mouth daily     • vitamin E 100 UNIT capsule Take 100 Units by mouth daily     • Zinc Sulfate (ZINC-220 PO) Take 1 tablet by mouth daily       No current facility-administered medications for this visit       Current Outpatient Medications on File Prior to Visit   Medication Sig   • alendronate (Fosamax) 70 mg tablet Take 1 tablet (70 mg total) by mouth every 7 days   • Ascorbic Acid (Vitamin C) 125 MG CHEW Chew 1 tablet daily   • aspirin 81 mg chewable tablet Chew 81 mg daily   • B Complex Vitamins (VITAMIN B COMPLEX 100 IJ)    • Calcium Carbonate-Vit D-Min (CALTRATE 600+D PLUS MINERALS PO) Take 1 tablet by mouth daily   • Cetirizine HCl (ZYRTEC ALLERGY PO) Take 1 tablet by mouth as needed     • Multiple Vitamin (MULTI-VITAMIN DAILY) TABS Take by mouth daily   • Multiple Vitamins-Minerals (HAIR SKIN NAILS PO) Take by mouth   • Trelegy Ellipta 200-62 5-25 MCG/INH AEPB inhaler Inhale 1 puff daily   • VITAMIN D PO Take 1 tablet by mouth daily   • vitamin E 100 UNIT capsule Take 100 Units by mouth daily   • Zinc Sulfate (ZINC-220 PO) Take 1 tablet by mouth daily     No current facility-administered medications on file prior to visit  She has No Known Allergies       Review of Systems   Constitutional: Negative for activity change, appetite change, chills and fever  HENT: Negative for congestion and rhinorrhea  Eyes: Negative for visual disturbance  Respiratory: Negative for chest tightness and shortness of breath  Cardiovascular: Negative for chest pain and palpitations  Gastrointestinal: Negative for abdominal pain, blood in stool, diarrhea, nausea and vomiting  Endocrine: Negative for polydipsia, polyphagia and polyuria  Genitourinary: Negative for dysuria, frequency and urgency  Musculoskeletal: Negative for gait problem  Skin: Negative for color change  Neurological: Negative for dizziness and headaches  Hematological: Does not bruise/bleed easily  Psychiatric/Behavioral: Negative for confusion and sleep disturbance  The patient is not nervous/anxious  Objective:      /78 (BP Location: Left arm, Patient Position: Sitting, Cuff Size: Large)   Pulse 62   Temp (!) 97 3 °F (36 3 °C)   Ht 5' 3" (1 6 m)   Wt 53 9 kg (118 lb 14 4 oz)   LMP  (LMP Unknown)   SpO2 94%   BMI 21 06 kg/m²          Physical Exam  Vitals and nursing note reviewed  Constitutional:       General: She is not in acute distress  Appearance: She is well-developed, well-groomed and underweight  HENT:      Head: Normocephalic and atraumatic  Eyes:      General:         Right eye: No discharge  Left eye: No discharge  Conjunctiva/sclera: Conjunctivae normal       Pupils: Pupils are equal, round, and reactive to light  Cardiovascular:      Rate and Rhythm: Normal rate and regular rhythm  Heart sounds: Normal heart sounds  No murmur heard  No friction rub  No gallop     Pulmonary: Effort: No respiratory distress  Breath sounds: Decreased breath sounds present  No wheezing or rales  Abdominal:      General: Bowel sounds are normal  There is no distension  Tenderness: There is no abdominal tenderness  Musculoskeletal:      Comments: Right third finger with slight flexion to the finger and tenderness over the base of the finger with some tightness to the tendon palpable   Lymphadenopathy:      Cervical: No cervical adenopathy  Skin:     General: Skin is warm and dry  Neurological:      Mental Status: She is alert and oriented to person, place, and time  Psychiatric:         Mood and Affect: Mood and affect normal          Speech: Speech normal          Behavior: Behavior normal  Behavior is cooperative  Cognition and Memory: Cognition and memory normal            Depression Screening and Follow-up Plan: Patient was screened for depression during today's encounter  They screened negative with a PHQ-2 score of 0

## 2023-02-06 ENCOUNTER — OFFICE VISIT (OUTPATIENT)
Dept: OBGYN CLINIC | Facility: CLINIC | Age: 73
End: 2023-02-06

## 2023-02-06 VITALS
SYSTOLIC BLOOD PRESSURE: 124 MMHG | HEIGHT: 63 IN | WEIGHT: 122 LBS | DIASTOLIC BLOOD PRESSURE: 68 MMHG | BODY MASS INDEX: 21.62 KG/M2

## 2023-02-06 DIAGNOSIS — N81.4 UTERINE PROLAPSE: ICD-10-CM

## 2023-02-06 DIAGNOSIS — N81.6 RECTOCELE: Primary | ICD-10-CM

## 2023-02-06 RX ORDER — ESTRADIOL 0.1 MG/G
0.5 CREAM VAGINAL 2 TIMES WEEKLY
Qty: 42.5 G | Refills: 1 | Status: SHIPPED | OUTPATIENT
Start: 2023-02-06

## 2023-02-06 NOTE — ASSESSMENT & PLAN NOTE
- We reviewed her diagnosis of POP-Q stage 3 rectocele  There is also an apical component to the prolapse but her symptoms are predominantly rectocele-related  We discussed management options  I don't think Pessary will help with her symptoms  We discussed a referral to urogynecology and she is interested  In the mean time she can start estrace for vaginal dryness which may be helpful prior to a rectocele repair  She is sexually active and wants to maintain vaginal patency

## 2023-02-06 NOTE — PROGRESS NOTES
[facility] [address] [place] [address]    Assessment/Plan:  Sondra Kothari is a 74 y o  [id number] who presents with symptomatic rectocele  Rectocele  - We reviewed her diagnosis of POP-Q stage 3 rectocele  There is also an apical component to the prolapse but her symptoms are predominantly rectocele-related  We discussed management options  I don't think Pessary will help with her symptoms  We discussed a referral to urogynecology and she is interested  In the mean time she can start estrace for vaginal dryness which may be helpful prior to a rectocele repair  She is sexually active and wants to maintain vaginal patency  Diagnoses and all orders for this visit:    Rectocele  -     Ambulatory Referral to Urogynecology; Future  -     estradiol (ESTRACE VAGINAL) 0 1 mg/g vaginal cream; Insert 0 5 g into the vagina 2 (two) times a week    Uterine prolapse  -     Ambulatory Referral to Gynecology          Subjective:   Sondra Kothari is a 74 y o   female  CC: prolapse    HPI: Jatin Givens presents for consult regarding prolapse  She has developed worsening symptoms over the last year  She suffers from occasional stool trapping and has to reduce the bulge to defecate  She has very occasional stress UI but doesn't wear a pad or liner  She is sexually active with one male partner  ROS: Review of Systems   Constitutional: Negative for chills and fever  Respiratory: Negative for cough and shortness of breath  Cardiovascular: Negative for chest pain and leg swelling  Gastrointestinal: Negative for abdominal pain, nausea and vomiting  Genitourinary: Negative for dysuria, frequency and urgency  Neurological: Negative for dizziness, light-headedness and headaches         PFSH: The following portions of the patient's history were reviewed and updated as appropriate: allergies, current medications, past family history, past medical history, obstetric history, gynecologic history, past social history, past surgical history and problem list        Objective:  /68   Ht 5' 3" (1 6 m)   Wt 55 3 kg (122 lb)   LMP  (LMP Unknown)   BMI 21 61 kg/m²    Physical Exam  Constitutional:       Appearance: Normal appearance  HENT:      Head: Normocephalic and atraumatic  Cardiovascular:      Rate and Rhythm: Normal rate  Pulmonary:      Effort: Pulmonary effort is normal    Abdominal:      General: There is no distension  Tenderness: There is no abdominal tenderness  There is no guarding  Genitourinary:     Exam position: Lithotomy position  Pubic Area: No rash  Labia:         Right: No rash, tenderness or lesion  Left: No rash, tenderness or lesion  Urethra: No prolapse, urethral swelling or urethral lesion  Vagina: No vaginal discharge, erythema, tenderness, bleeding or lesions  Cervix: No cervical motion tenderness, discharge, friability or erythema  Comments: POP-Q Stage 3 rectocele  Bp = +2,  TVL 8  C = -3  Lymphadenopathy:      Lower Body: No right inguinal adenopathy  No left inguinal adenopathy  Neurological:      Mental Status: She is alert             Haritha Ackerman MD  [address] [place] [address]  2/6/2023 10:35 AM

## 2023-02-08 ENCOUNTER — HOSPITAL ENCOUNTER (OUTPATIENT)
Dept: MAMMOGRAPHY | Facility: HOSPITAL | Age: 73
Discharge: HOME | End: 2023-02-08

## 2023-02-08 VITALS — BODY MASS INDEX: 21.62 KG/M2 | HEIGHT: 63 IN | WEIGHT: 122 LBS

## 2023-02-08 DIAGNOSIS — Z12.31 VISIT FOR SCREENING MAMMOGRAM: ICD-10-CM

## 2023-03-30 ENCOUNTER — APPOINTMENT (OUTPATIENT)
Dept: LAB | Facility: CLINIC | Age: 73
End: 2023-03-30

## 2023-03-30 DIAGNOSIS — Z01.812 PRE-OPERATIVE LABORATORY EXAMINATION: ICD-10-CM

## 2023-03-30 DIAGNOSIS — R39.15 URGENCY OF URINATION: ICD-10-CM

## 2023-03-30 LAB
ANION GAP SERPL CALCULATED.3IONS-SCNC: 1 MMOL/L (ref 4–13)
BASOPHILS # BLD AUTO: 0.1 THOUSANDS/ÂΜL (ref 0–0.1)
BASOPHILS NFR BLD AUTO: 1 % (ref 0–1)
BUN SERPL-MCNC: 12 MG/DL (ref 5–25)
CALCIUM SERPL-MCNC: 9.7 MG/DL (ref 8.3–10.1)
CHLORIDE SERPL-SCNC: 104 MMOL/L (ref 96–108)
CO2 SERPL-SCNC: 27 MMOL/L (ref 21–32)
CREAT SERPL-MCNC: 0.7 MG/DL (ref 0.6–1.3)
EOSINOPHIL # BLD AUTO: 0.19 THOUSAND/ÂΜL (ref 0–0.61)
EOSINOPHIL NFR BLD AUTO: 2 % (ref 0–6)
ERYTHROCYTE [DISTWIDTH] IN BLOOD BY AUTOMATED COUNT: 15.6 % (ref 11.6–15.1)
GFR SERPL CREATININE-BSD FRML MDRD: 85 ML/MIN/1.73SQ M
GLUCOSE P FAST SERPL-MCNC: 85 MG/DL (ref 65–99)
HCT VFR BLD AUTO: 43.9 % (ref 34.8–46.1)
HGB BLD-MCNC: 13.8 G/DL (ref 11.5–15.4)
IMM GRANULOCYTES # BLD AUTO: 0.02 THOUSAND/UL (ref 0–0.2)
IMM GRANULOCYTES NFR BLD AUTO: 0 % (ref 0–2)
LYMPHOCYTES # BLD AUTO: 2.63 THOUSANDS/ÂΜL (ref 0.6–4.47)
LYMPHOCYTES NFR BLD AUTO: 33 % (ref 14–44)
MCH RBC QN AUTO: 29.9 PG (ref 26.8–34.3)
MCHC RBC AUTO-ENTMCNC: 31.4 G/DL (ref 31.4–37.4)
MCV RBC AUTO: 95 FL (ref 82–98)
MONOCYTES # BLD AUTO: 0.67 THOUSAND/ÂΜL (ref 0.17–1.22)
MONOCYTES NFR BLD AUTO: 8 % (ref 4–12)
NEUTROPHILS # BLD AUTO: 4.44 THOUSANDS/ÂΜL (ref 1.85–7.62)
NEUTS SEG NFR BLD AUTO: 56 % (ref 43–75)
NRBC BLD AUTO-RTO: 0 /100 WBCS
PLATELET # BLD AUTO: 255 THOUSANDS/UL (ref 149–390)
PMV BLD AUTO: 9.9 FL (ref 8.9–12.7)
POTASSIUM SERPL-SCNC: 4.5 MMOL/L (ref 3.5–5.3)
RBC # BLD AUTO: 4.61 MILLION/UL (ref 3.81–5.12)
SODIUM SERPL-SCNC: 132 MMOL/L (ref 135–147)
WBC # BLD AUTO: 8.05 THOUSAND/UL (ref 4.31–10.16)

## 2023-04-12 ENCOUNTER — ANESTHESIA EVENT (OUTPATIENT)
Dept: PERIOP | Facility: HOSPITAL | Age: 73
End: 2023-04-12

## 2023-04-17 PROBLEM — Z01.818 PRE-OP EVALUATION: Status: ACTIVE | Noted: 2023-04-17

## 2023-04-17 PROBLEM — M65.30 ACQUIRED TRIGGER FINGER: Status: ACTIVE | Noted: 2023-01-26

## 2023-04-18 NOTE — PRE-PROCEDURE INSTRUCTIONS
Pre-Surgery Instructions:   Medication Instructions   • alendronate (Fosamax) 70 mg tablet Continue normal schedule   • Ascorbic Acid (Vitamin C) 125 MG CHEW Stop taking 7 days prior to surgery  • aspirin 81 mg chewable tablet Stop taking 7 days prior to surgery  • B Complex Vitamins (VITAMIN B COMPLEX 100 IJ) Stop taking 7 days prior to surgery  • Calcium Carbonate-Vit D-Min (CALTRATE 600+D PLUS MINERALS PO) Stop taking 7 days prior to surgery  • Cetirizine HCl (ZYRTEC ALLERGY PO) Uses PRN- OK to take day of surgery   • estradiol (ESTRACE VAGINAL) 0 1 mg/g vaginal cream Hold day of surgery  • Multiple Vitamin (MULTI-VITAMIN DAILY) TABS Stop taking 7 days prior to surgery  • Multiple Vitamins-Minerals (HAIR SKIN NAILS PO) Stop taking 7 days prior to surgery  • Trelegy Ellipta 200-62 5-25 MCG/INH AEPB inhaler Take day of surgery  • Zinc Sulfate (ZINC-220 PO) Stop taking 7 days prior to surgery  Medication instructions for day surgery reviewed  Please use only a sip of water to take your instructed medications  Avoid all over the counter vitamins, supplements and NSAIDS for one week prior to surgery per anesthesia guidelines  Tylenol is ok to take as needed  You will receive a call one business day prior to surgery with an arrival time and hospital directions  If your surgery is scheduled on a Monday, the hospital will be calling you on the Friday prior to your surgery  If you have not heard from anyone by 8pm, please call the hospital supervisor through the hospital  at [phone]  Emeli Sees [phone])  Do not eat or drink anything after midnight the night before your surgery, including candy, mints, lifesavers, or chewing gum  Do not drink alcohol 24hrs before your surgery  Try not to smoke at least 24hrs before your surgery  Follow the pre surgery showering instructions as listed in the [facility] Surgical Experience Booklet” or otherwise provided by your surgeon's office   Do not shave the surgical area 24 hours before surgery  Do not apply any lotions, creams, including makeup, cologne, deodorant, or perfumes after showering on the day of your surgery  No contact lenses, eye make-up, or artificial eyelashes  Remove nail polish, including gel polish, and any artificial, gel, or acrylic nails if possible  Remove all jewelry including rings and body piercing jewelry  Wear causal clothing that is easy to take on and off  Consider your type of surgery  Keep any valuables, jewelry, piercings at home  Please bring any specially ordered equipment (sling, braces) if indicated  Arrange for a responsible person to drive you to and from the hospital on the day of your surgery  Visitor Guidelines discussed  Call the surgeon's office with any new illnesses, exposures, or additional questions prior to surgery  Please reference your [facility] Surgical Experience Booklet” for additional information to prepare for your upcoming surgery

## 2023-04-20 ENCOUNTER — TELEPHONE (OUTPATIENT)
Dept: INTERNAL MEDICINE CLINIC | Facility: CLINIC | Age: 73
End: 2023-04-20

## 2023-04-20 NOTE — TELEPHONE ENCOUNTER
"Received the following message on patients upcoming surgery  Please advise  \"Hi, this is [facility] calling from [facility] pre admission testing department regarding patient Sonam Serranoma date of birth [birth year]  She's having surgery on Monday the 24th  Doctor Erickson England did her medical clearance visit but it was pending chest X-ray and EKG results  Can you please have Doctor Akhil Benitez review the chest X-ray and EKG results and provide an addendum to her medical clearance that the patient is cleared for surgery Monday? Thank you so much and if you have any questions, please feel free to call me back [phone]  Thank you    \"  "

## 2023-04-24 ENCOUNTER — ANESTHESIA (OUTPATIENT)
Dept: PERIOP | Facility: HOSPITAL | Age: 73
End: 2023-04-24

## 2023-04-24 ENCOUNTER — HOSPITAL ENCOUNTER (OUTPATIENT)
Facility: HOSPITAL | Age: 73
Setting detail: OUTPATIENT SURGERY
Discharge: HOME | End: 2023-04-24
Attending: OBSTETRICS & GYNECOLOGY | Admitting: OBSTETRICS & GYNECOLOGY

## 2023-04-24 VITALS
HEART RATE: 54 BPM | WEIGHT: 118.83 LBS | OXYGEN SATURATION: 95 % | BODY MASS INDEX: 21.05 KG/M2 | HEIGHT: 63 IN | TEMPERATURE: 97.2 F | SYSTOLIC BLOOD PRESSURE: 109 MMHG | RESPIRATION RATE: 16 BRPM | DIASTOLIC BLOOD PRESSURE: 67 MMHG

## 2023-04-24 DIAGNOSIS — Z98.890 POSTOPERATIVE STATE: Primary | ICD-10-CM

## 2023-04-24 PROBLEM — N81.84 PELVIC MUSCLE WASTING: Status: ACTIVE | Noted: 2023-04-24

## 2023-04-24 PROBLEM — N36.41 HYPERMOBILITY OF URETHRA: Status: ACTIVE | Noted: 2023-04-24

## 2023-04-24 PROBLEM — N39.3 STRESS INCONTINENCE (FEMALE) (MALE): Status: ACTIVE | Noted: 2023-04-24

## 2023-04-24 PROBLEM — N81.11 CYSTOCELE, MIDLINE: Status: ACTIVE | Noted: 2023-04-24

## 2023-04-24 DEVICE — MID-URETHRAL SLING ASSEMBLY, FLAT NEEDLES
Type: IMPLANTABLE DEVICE | Status: FUNCTIONAL
Brand: ATHENA SURGICAL RMUS SYSTEM

## 2023-04-24 DEVICE — THE NEUGUIDE™ IS A SINGLE USE TRANS-VAGINAL DEVICE FOR THE REPAIR OF PELVIC ORGAN PROLAPSE (POP) BY ANCHORING SUTURES TO LIGAMENTS OF THE PELVIC FLOOR.
Type: IMPLANTABLE DEVICE | Status: FUNCTIONAL
Brand: NEUGUIDE™

## 2023-04-24 RX ORDER — IBUPROFEN 600 MG/1
600 TABLET ORAL EVERY 6 HOURS PRN
Status: DISCONTINUED | OUTPATIENT
Start: 2023-04-24 | End: 2023-04-24 | Stop reason: HOSPADM

## 2023-04-24 RX ORDER — PROPOFOL 10 MG/ML
INJECTION, EMULSION INTRAVENOUS CONTINUOUS PRN
Status: DISCONTINUED | OUTPATIENT
Start: 2023-04-24 | End: 2023-04-24

## 2023-04-24 RX ORDER — DOCUSATE SODIUM 100 MG/1
100 CAPSULE, LIQUID FILLED ORAL 2 TIMES DAILY
Qty: 30 CAPSULE | Refills: 0 | Status: SHIPPED | OUTPATIENT
Start: 2023-04-24

## 2023-04-24 RX ORDER — GLYCOPYRROLATE 0.2 MG/ML
INJECTION INTRAMUSCULAR; INTRAVENOUS AS NEEDED
Status: DISCONTINUED | OUTPATIENT
Start: 2023-04-24 | End: 2023-04-24

## 2023-04-24 RX ORDER — ONDANSETRON 2 MG/ML
INJECTION INTRAMUSCULAR; INTRAVENOUS AS NEEDED
Status: DISCONTINUED | OUTPATIENT
Start: 2023-04-24 | End: 2023-04-24

## 2023-04-24 RX ORDER — DEXAMETHASONE SODIUM PHOSPHATE 10 MG/ML
INJECTION, SOLUTION INTRAMUSCULAR; INTRAVENOUS AS NEEDED
Status: DISCONTINUED | OUTPATIENT
Start: 2023-04-24 | End: 2023-04-24

## 2023-04-24 RX ORDER — FENTANYL CITRATE 50 UG/ML
INJECTION, SOLUTION INTRAMUSCULAR; INTRAVENOUS AS NEEDED
Status: DISCONTINUED | OUTPATIENT
Start: 2023-04-24 | End: 2023-04-24

## 2023-04-24 RX ORDER — FENTANYL CITRATE/PF 50 MCG/ML
25 SYRINGE (ML) INJECTION
Status: DISCONTINUED | OUTPATIENT
Start: 2023-04-24 | End: 2023-04-24 | Stop reason: HOSPADM

## 2023-04-24 RX ORDER — LIDOCAINE HYDROCHLORIDE 20 MG/ML
INJECTION, SOLUTION EPIDURAL; INFILTRATION; INTRACAUDAL; PERINEURAL AS NEEDED
Status: DISCONTINUED | OUTPATIENT
Start: 2023-04-24 | End: 2023-04-24

## 2023-04-24 RX ORDER — GABAPENTIN 400 MG/1
400 CAPSULE ORAL ONCE
Status: COMPLETED | OUTPATIENT
Start: 2023-04-24 | End: 2023-04-24

## 2023-04-24 RX ORDER — OXYCODONE HYDROCHLORIDE 5 MG/1
5 TABLET ORAL EVERY 4 HOURS PRN
Status: DISCONTINUED | OUTPATIENT
Start: 2023-04-24 | End: 2023-04-24 | Stop reason: HOSPADM

## 2023-04-24 RX ORDER — PROPOFOL 10 MG/ML
INJECTION, EMULSION INTRAVENOUS AS NEEDED
Status: DISCONTINUED | OUTPATIENT
Start: 2023-04-24 | End: 2023-04-24

## 2023-04-24 RX ORDER — ONDANSETRON 2 MG/ML
4 INJECTION INTRAMUSCULAR; INTRAVENOUS ONCE AS NEEDED
Status: DISCONTINUED | OUTPATIENT
Start: 2023-04-24 | End: 2023-04-24 | Stop reason: HOSPADM

## 2023-04-24 RX ORDER — EPHEDRINE SULFATE 50 MG/ML
INJECTION INTRAVENOUS AS NEEDED
Status: DISCONTINUED | OUTPATIENT
Start: 2023-04-24 | End: 2023-04-24

## 2023-04-24 RX ORDER — SODIUM CHLORIDE 9 MG/ML
125 INJECTION, SOLUTION INTRAVENOUS CONTINUOUS
Status: DISCONTINUED | OUTPATIENT
Start: 2023-04-24 | End: 2023-04-24 | Stop reason: HOSPADM

## 2023-04-24 RX ORDER — SODIUM CHLORIDE, SODIUM LACTATE, POTASSIUM CHLORIDE, CALCIUM CHLORIDE 600; 310; 30; 20 MG/100ML; MG/100ML; MG/100ML; MG/100ML
INJECTION, SOLUTION INTRAVENOUS CONTINUOUS PRN
Status: DISCONTINUED | OUTPATIENT
Start: 2023-04-24 | End: 2023-04-24

## 2023-04-24 RX ORDER — SENNOSIDES 8.6 MG
650 CAPSULE ORAL EVERY 8 HOURS PRN
Qty: 30 TABLET | Refills: 0 | Status: SHIPPED | OUTPATIENT
Start: 2023-04-24

## 2023-04-24 RX ORDER — KETOROLAC TROMETHAMINE 30 MG/ML
INJECTION, SOLUTION INTRAMUSCULAR; INTRAVENOUS AS NEEDED
Status: DISCONTINUED | OUTPATIENT
Start: 2023-04-24 | End: 2023-04-24

## 2023-04-24 RX ORDER — FUROSEMIDE 10 MG/ML
INJECTION INTRAMUSCULAR; INTRAVENOUS AS NEEDED
Status: DISCONTINUED | OUTPATIENT
Start: 2023-04-24 | End: 2023-04-24

## 2023-04-24 RX ORDER — ONDANSETRON 2 MG/ML
4 INJECTION INTRAMUSCULAR; INTRAVENOUS EVERY 6 HOURS PRN
Status: DISCONTINUED | OUTPATIENT
Start: 2023-04-24 | End: 2023-04-24 | Stop reason: HOSPADM

## 2023-04-24 RX ORDER — ACETAMINOPHEN 325 MG/1
975 TABLET ORAL EVERY 6 HOURS PRN
Status: DISCONTINUED | OUTPATIENT
Start: 2023-04-24 | End: 2023-04-24 | Stop reason: HOSPADM

## 2023-04-24 RX ORDER — IBUPROFEN 600 MG/1
600 TABLET ORAL EVERY 6 HOURS PRN
Qty: 30 TABLET | Refills: 0 | Status: SHIPPED | OUTPATIENT
Start: 2023-04-24

## 2023-04-24 RX ORDER — KETAMINE HCL IN NACL, ISO-OSM 100MG/10ML
SYRINGE (ML) INJECTION AS NEEDED
Status: DISCONTINUED | OUTPATIENT
Start: 2023-04-24 | End: 2023-04-24

## 2023-04-24 RX ORDER — ACETAMINOPHEN 325 MG/1
975 TABLET ORAL ONCE
Status: COMPLETED | OUTPATIENT
Start: 2023-04-24 | End: 2023-04-24

## 2023-04-24 RX ORDER — CEFAZOLIN SODIUM 2 G/50ML
2000 SOLUTION INTRAVENOUS ONCE
Status: COMPLETED | OUTPATIENT
Start: 2023-04-24 | End: 2023-04-24

## 2023-04-24 RX ADMIN — FUROSEMIDE 10 MG: 10 INJECTION, SOLUTION INTRAVENOUS at 11:37

## 2023-04-24 RX ADMIN — GLYCOPYRROLATE 0.2 MG: 0.2 INJECTION INTRAMUSCULAR; INTRAVENOUS at 10:14

## 2023-04-24 RX ADMIN — KETOROLAC TROMETHAMINE 15 MG: 30 INJECTION, SOLUTION INTRAMUSCULAR; INTRAVENOUS at 11:54

## 2023-04-24 RX ADMIN — FENTANYL CITRATE 50 MCG: 50 INJECTION INTRAMUSCULAR; INTRAVENOUS at 10:07

## 2023-04-24 RX ADMIN — EPHEDRINE SULFATE 10 MG: 50 INJECTION, SOLUTION INTRAVENOUS at 10:16

## 2023-04-24 RX ADMIN — DEXAMETHASONE SODIUM PHOSPHATE 10 MG: 10 INJECTION INTRAMUSCULAR; INTRAVENOUS at 10:07

## 2023-04-24 RX ADMIN — Medication 10 MG: at 10:45

## 2023-04-24 RX ADMIN — LIDOCAINE HYDROCHLORIDE 40 MG: 20 INJECTION, SOLUTION EPIDURAL; INFILTRATION; INTRACAUDAL; PERINEURAL at 10:07

## 2023-04-24 RX ADMIN — FENTANYL CITRATE 50 MCG: 50 INJECTION INTRAMUSCULAR; INTRAVENOUS at 09:59

## 2023-04-24 RX ADMIN — CEFAZOLIN SODIUM 2000 MG: 2 SOLUTION INTRAVENOUS at 09:50

## 2023-04-24 RX ADMIN — GABAPENTIN 400 MG: 400 CAPSULE ORAL at 07:42

## 2023-04-24 RX ADMIN — ACETAMINOPHEN 325MG 975 MG: 325 TABLET ORAL at 07:41

## 2023-04-24 RX ADMIN — Medication 10 MG: at 11:11

## 2023-04-24 RX ADMIN — ONDANSETRON 4 MG: 2 INJECTION INTRAMUSCULAR; INTRAVENOUS at 10:07

## 2023-04-24 RX ADMIN — PROPOFOL 100 MCG/KG/MIN: 10 INJECTION, EMULSION INTRAVENOUS at 10:07

## 2023-04-24 RX ADMIN — PROPOFOL 10 MG: 10 INJECTION, EMULSION INTRAVENOUS at 10:26

## 2023-04-24 RX ADMIN — Medication 10 MG: at 10:07

## 2023-04-24 RX ADMIN — SODIUM CHLORIDE, SODIUM LACTATE, POTASSIUM CHLORIDE, AND CALCIUM CHLORIDE: .6; .31; .03; .02 INJECTION, SOLUTION INTRAVENOUS at 10:34

## 2023-04-24 RX ADMIN — SODIUM CHLORIDE: 0.9 INJECTION, SOLUTION INTRAVENOUS at 09:31

## 2023-04-24 RX ADMIN — Medication 10 MG: at 11:25

## 2023-04-24 RX ADMIN — Medication 10 MG: at 10:25

## 2023-04-24 NOTE — ANESTHESIA POSTPROCEDURE EVALUATION
"Post-Op Assessment Note    CV Status:  Stable    Pain management: adequate     Mental Status:  Alert and awake   Hydration Status:  Euvolemic   PONV Controlled:  Controlled   Airway Patency:  Patent      Post Op Vitals Reviewed: Yes      Staff: Anesthesiologist         No notable events documented      BP      Temp     Pulse     Resp      SpO2      /61   Pulse 64   Temp (!) 97 °F (36 1 °C)   Resp 12   Ht 5' 3\" (1 6 m)   Wt 53 9 kg (118 lb 13 3 oz)   LMP  (LMP Unknown)   SpO2 94%   BMI 21 05 kg/m²     "

## 2023-04-24 NOTE — OP NOTE
OPERATIVE REPORT  PATIENT NAME: Fausto García    :  1950  MRN: [id number]  Pt Location: [facility] OR ROOM 04    SURGERY DATE: 2023    Surgeon(s) and Role: * Chris [place] MD - Primary     * Awilda Garrison MD - Assisting     * [place] Sands MD - Fellow     * Kayla Lopez MD - Observing     Preop Diagnosis:  Incomplete uterovaginal prolapse [N81 2]  Cystocele, midline [N81 11]  Rectocele [N81 6]  Pelvic muscle wasting [N81 84]  Other female genital prolapse [N81 89]  Hypermobility of urethra [N36 41]  Intrinsic sphincter deficiency (ISD) [N36 42]  Stress incontinence (female) (male) [N39 3]    Post-Op Diagnosis Codes:     * Incomplete uterovaginal prolapse [N81 2]     * Cystocele, midline [N81 11]     * Rectocele [N81 6]     * Pelvic muscle wasting [N81 84]     * Other female genital prolapse [N81 89]     * Hypermobility of urethra [N36 41]     * Intrinsic sphincter deficiency (ISD) [N36 42]     * Stress incontinence (female) (male) [N39 3]    Procedure(s):  COLPOSUSPENSION VAGINAL EXTRAPERITONEAL(ENPLACE)  A&P COLPORRHAPHY  PV SLING (RITESH)  CYSTO    Estimated Blood Loss: Minimal      Anesthesia Type:   IV Sedation with Anesthesia    Operative Indications:  Incomplete uterovaginal prolapse [N81 2]  Cystocele, midline [N81 11]  Rectocele [N81 6]  Pelvic muscle wasting [N81 84]  Other female genital prolapse [N81 89]  Hypermobility of urethra [N36 41]  Intrinsic sphincter deficiency (ISD) [N36 42]  Stress incontinence (female) (male) [N39 3]    Operative Findings:  - Incomplete uterovaginal prolapse with C at +3   - Right SSL anchor placed at 30 yard line, Left anchor at the 2 yard line   - Relaxed vaginal outlet   - Cystoscopy: efflux noted from bilateral ureteral orifices  No mesh, suture material, or injury noted to bladder lumen  Complications:   None    Procedure and Technique:  Appropriate preoperative antibiotics chosen per ACOG guidelines were given    Bilateral SCDs were placed in the lower extremities for DVT prevention prior to the institution of anesthesia  The patient was identified in the holding area by the operating room staff and attending physician  She was taken to the operating room where anesthesia was instituted without complications  She was placed in the dorsal lithotomy position with the legs in [address] [place] [address] with care taken to avoid excessive flexion or extension of her lower extremities  The patient was prepped and draped in the usual sterile fashion  A Millard catheter was inserted  A finger was placed in the lateral vagina, with careful palpation of the ischial spines and sacrospinous ligaments bilaterally  The right finger sleeve was then applied to the surgeon's index finger  These landmarks were then again palpated with the finger sleeve in place  Location along the sacrospinous ligament approximately in the middle 1/3 of the ligament as well as the lower 1/3 of the ligament was carefully palpated, and the trocar device loaded with the trocar device loaded with a 7 inch 22G spinal needle to inject the trocar site initially with Marcaine/Lidocaine/Epinephrine local totaling 1 5 cc injection  The trocar with 0 Prolene suture at this location while the surgeons finger was firmly pressed against the ligament, approximately 2 cm medial to the ischial spine using the sacrum as a guide  Once the suture was anchored in place, which was confirmed by tenting of the ligament with gentle tugging, all instruments were removed from the vagina  Rectal exam confirmed proper location as well  She had the exact same procedure performed on the contralateral side  Next local was injected into the cervico-vaginal junction and a 3 cm anterior vaginal epithelial incision was made along the anterior cervico-vaginal junction, until the cervical stroma was encountered   Next, one strand of the Prolene suture was passed backwards using a large renae needle through its entering point in the vaginal wall and medially through the cervical stromal tissue  The second strand on the same side was passed caudally and proximal to the cervical os using the same technique  The exact same procedure was performed on the contralateral side  Attention was turned to the anterior wall where a persistent cystocele was noted  Two Allis clamps were applied horizontally along the vaginal incision to grasp the dependent portion of the cystocele for traction  The epithelium was further  from the vaginal muscularis sharply with tenotomy and metzenbaum scissors and bluntly with peanut sponges and a raytec  Excess vaginal mucosal skin was trimmed  The vaginal wall was then plicated in a vertical imbricating fashion using 2-0 PDS  We then began closure of the anterior vaginal epithelial incision with a 2-0 Vicryl sutures in a running locked stitch from lateral to medial with two separate sutures from each vaginal apex  The incision was intentionally left open after the first few throws from each side  The EnPlace prolene sutures were then tied down separately, one at a time until proper apical support was obtained and then they were tied together pushing the prolapse up to the anatomic position of the vaginal apex  The 2-0 PDS stitches were then tied down  We then finished closure of the anterior vaginal epithelial incision with the two 2-0 Vicryl sutures in a running locked stitch from lateral to medial  The incision was inspected and noted to be hemostatic  We started the retropubic sling procedure  We identified two areas on the anterior abdominal wall 2 fingerbreadths above the level of pubic bone 2 fingerbreadths lateral to the midline of the pubic bone  We put two skin marks and then we infiltrated with local anesthetic solution at that area and then underneath the pubic bone  We then turned our attention to the anterior vaginal wall   The mid urethral level was identified at the level of the Millard catheter, and local anesthetic solution was injected into the anterior vaginal wall at the midurethral level for hydrodissection and vasoconstriction  Then, local was  injected at the midline and to the left and right of midline directing the infiltration laterally towards the cephalad aspect of the inferior pubic ramus bilaterally  Care was taken to ensure that the sulcus was flattened and free of infiltration to minimize the chance for erosion  After completion of hydrodissection, 2 Allis clamps were used to grasp the anterior vaginal wall for traction, and a 1 5 cm incision was made at the midline  Two Allis clamps were then placed on each cut edge of the incision for stabilization  Tenotomy scissors were used to create a small vaginal tunnel with sharp dissection in the anterior vaginal wall directing the dissection lateral to the midline, but going towards the pubic bone  Dissection was carried out to the edge of the pubic bone itself, and we did this bilaterally  Once the dissection was complete, the Millard was used to drain the bladder The rigid catheter guide stylette was placed through the Millard and used for deflection of the bladder, and this was placed without complications  We deviated the bladder to the left side of the patient, and we passed the Chery introducer trocar into the pre-dissected tract on the right side of the patient  Once we pierced the pubocervical fascia and passed the bone, we directed our trajectory of the trocar towards the anterior abdominal wall towards the premarked skin on the right side the patient  Once we were at the level of the skin, we incised the skin at the karuna with a scalpel, and the trocar was passed through that incision  The handle  was removed leaving the trocar in place  We removed the Millard and we performed a cystoscopy by instilling 300 mL of fluid inside the bladder   At this time, we saw the tract outside the bladder of the previously placed trocar and we did not see any lacerations or injuries to the bladder  The trocar was rolled without puckering of the bladder mucosa  We reinserted the Millard catheter to drain the bladder  A Shirlene clamp was placed over the sling arm and the introducer was cut off with scissors  We replaced the Millard catheter and the bladder was drained  We repeated the same sequence of steps for the placement of the left side  Once the cystoscopy was repeated, we did not see any laceration on the left side upon the bladder being filled with fluid again up to 300 mL  A shirlene was placed to hold the sling arm and the introducer was detached from the sling arm with scissors  The sling was set empirically  Once the sling was tensioned properly we removed the plastic sleeves surrounding the sling arms by gently pulling them up through the skin incisions bilaterally with a Shirlene clamp  During this step we had a hemostat placed suburethrally to ensure the sling did not tension more than the position it was in prior to pulling on the sling arms  The sling arms were then trimmed to the level of the skin  The incision in the vaginal mucosa was closed with 2-0 Vicryl suture in a running locked stitch  Good hemostasis was achieved  The puncture sites over the mons were cleaned and closed with Exofin skin glue  At this time, we placed the Millard back in the bladder  Attention was then turned to the posterior compartment where 2 Allis clamps were placed in the posterior fourchette over the mucocutaneous border to reduce the markedly relaxed vaginal outlet  Dilute Marcaine solution was injected into the perineum  A benton-shaped incision was made extending from the vaginal mucosa onto the perineum  The overlying skin was removed en bloc  We then began closure of the posterior colporrhaphy with a 2-0 Vicryl suture in a running locked stitch  We reapproximated the perineal body with a 0-Vicryl interrupted sutures x 2   We closed the remainder of the colporrhaphy to the level of the hymen  We closed the perineal skin with 2-0 Vicryl in a subcuticular fashion  No bladder, ureteral, viscus, or solid organ injury were noted at the end of the procedure  Irrigation was performed  We completed the procedure  Vaginal packing was placed in the vagina  There were no complications  The sponge, needle and instrument count were correct x2  The patient tolerated the procedure well and went to the recovery room in stable condition and she is stable at the moment of this dictation  Dr Bahman Castro was present for the entire procedure      Patient Disposition:  PACU         SIGNATURE: Krissy Almonte MD  DATE: April 24, 2023  TIME: 12:31 PM

## 2023-04-24 NOTE — DISCHARGE INSTRUCTIONS
Apical Vaginal Repair (EnPlace System)    WHAT YOU NEED TO KNOW:   An apical vaginal repair is a procedure to lift the cervix, vagina  and surrounding structures to the normal anatomical position  This can help prevent you from having a bulge sensation in the vagina  The procedure is also called an EnPlace procedure  **** VAGINAL PACKING WAS PLACED IN THE VAGINA- This packing will need to be removed at home on postoperative #3 ****      DISCHARGE INSTRUCTIONS:   Medicines:   Pain medicine: You may need medicine to take away or decrease pain  Learn how to take your medicine  Ask what medicine and how much you should take  Be sure you know how, when, and how often to take it  Do not wait until the pain is severe before you take your medicine  Tell caregivers if your pain does not decrease  Pain medicine can make you dizzy or sleepy  Prevent falls by calling someone when you get out of bed or if you need help  Antibiotics: This medicine is given to fight or prevent an infection caused by bacteria  Always take your antibiotics exactly as ordered by your healthcare provider  Do not stop taking your medicine unless directed by your healthcare provider  Never save antibiotics or take leftover antibiotics that were given to you for another illness  Take your medicine as directed  Contact your healthcare provider if you think your medicine is not helping or if you have side effects  Tell him or her if you are allergic to any medicine  Keep a list of the medicines, vitamins, and herbs you take  Include the amounts, and when and why you take them  Bring the list or the pill bottles to follow-up visits  Carry your medicine list with you in case of an emergency  Follow up with your healthcare provider as directed:  Write down your questions so you remember to ask them during your visits  Self-care:   Vaginal packing: Vaginal packing was placed into the vagina   You will remove this packing on postoperative #3 early in the morning  (This is the 3rd day after your surgery)  The office will call to remind you to remove this packing  Sex:  Do not have sex until your healthcare provider says it is okay  Kegel exercises: To do kegel exercises, squeeze your pelvic floor muscles for 5 to 10 seconds, then release  Regular kegel exercises will help your pelvic floor muscles become stronger  This will help prevent you from leaking urine  Ask your healthcare provider when to start these exercises and how often to do them  Sanitary pad:  Change your sanitary pad regularly  Keep track of how often you change the pad  Millard catheter:  Keep the bag below your waist  This will help prevent infection and other problems caused by urine flowing back into your bladder  Do not pull on the catheter because this can cause pain and bleeding, and the catheter could come out  Keep the catheter tubing free of kinks so your urine will flow into the bag  Your healthcare provider will remove the catheter as soon as possible, to help prevent infection  Wound care:  When you are allowed to bathe or shower, carefully wash your vaginal area with soap and water  Do not put pressure on your abdomen: This will help prevent damage to your surgery area  Do not strain, lift heavy objects, or stand for a very long time  Do not perform strenuous exercises, such as running and weight lifting  Activity:  You may need to start walking within a few days after your procedure  Ask your healthcare provider when to start and how long you should walk  Ask about any other exercises that may be right for you  Support socks: You may need to wear support socks  These are tight socks that help increase the circulation in your legs until you are more active  This helps prevent blood clots  Contact your healthcare provider if:   You soak a sanitary pad with blood every hour for 4 hours      You have vaginal pain that does not go away even after you take pain medicine  You have pus or a foul-smelling discharge from your genital area  You see blood in your urine  You have pain during sex  You have a fever, chills, a cough, or feel weak and achy  You have nausea and vomiting  You have questions or concerns about your condition or care  Seek care immediately or call 911 if: You feel something is bulging out into your vagina or rectum and not going back in  You cannot urinate  Your arm or leg feels warm, tender, and painful  It may look swollen and red  You suddenly feel lightheaded and short of breath  You have chest pain  You may have more pain when you take a deep breath or cough  You may cough up blood  © 2017 [facility] Information is for End User's use only and may not be sold, redistributed or otherwise used for commercial purposes  All illustrations and images included in CareNotes® are the copyrighted property of [employer]  or [employer] Arias  The above information is an  only  It is not intended as medical advice for individual conditions or treatments  Talk to your doctor, nurse or pharmacist before following any medical regimen to see if it is safe and effective for you

## 2023-04-24 NOTE — ANESTHESIA PREPROCEDURE EVALUATION
Procedure:  COLPOSUSPENSION VAGINAL EXTRAPERITONEAL(ENPLACE) (Vagina )  A&P COLPORRHAPHY (Perineum)  PV SLING (RITESH) (Vagina )  CYSTO (Bladder)    Relevant Problems   CARDIO   (+) Ectopic beat   (+) Hyperlipidemia   (+) Mild mitral regurgitation      PULMONARY   (+) Severe chronic obstructive pulmonary disease (HCC)   (+) Smoker        Physical Exam    Airway    Mallampati score: II  TM Distance: >3 FB  Neck ROM: full     Dental   No notable dental hx     Cardiovascular  Rhythm: regular, Rate: normal, Cardiovascular exam normal    Pulmonary  Pulmonary exam normal Breath sounds clear to auscultation,     Other Findings        Anesthesia Plan  ASA Score- 3     Anesthesia Type- IV sedation with anesthesia with ASA Monitors  Additional Monitors:   Airway Plan:     Comment: GA prn  Plan Factors-    Chart reviewed  Patient summary reviewed  Patient is a current smoker  Patient instructed to abstain from smoking on day of procedure  Patient did not smoke on day of surgery  Induction- intravenous  Postoperative Plan-     Informed Consent- Anesthetic plan and risks discussed with patient and daughter

## 2023-06-26 ENCOUNTER — APPOINTMENT (OUTPATIENT)
Dept: RADIOLOGY | Facility: CLINIC | Age: 73
End: 2023-06-26
Payer: MEDICARE

## 2023-06-26 ENCOUNTER — OFFICE VISIT (OUTPATIENT)
Dept: URGENT CARE | Facility: CLINIC | Age: 73
End: 2023-06-26
Payer: MEDICARE

## 2023-06-26 VITALS
TEMPERATURE: 98.2 F | HEART RATE: 80 BPM | SYSTOLIC BLOOD PRESSURE: 159 MMHG | DIASTOLIC BLOOD PRESSURE: 73 MMHG | RESPIRATION RATE: 18 BRPM | OXYGEN SATURATION: 96 %

## 2023-06-26 DIAGNOSIS — J02.9 SORE THROAT: ICD-10-CM

## 2023-06-26 DIAGNOSIS — J06.9 VIRAL UPPER RESPIRATORY TRACT INFECTION WITH COUGH: Primary | ICD-10-CM

## 2023-06-26 DIAGNOSIS — R05.1 ACUTE COUGH: ICD-10-CM

## 2023-06-26 DIAGNOSIS — Z87.09 HISTORY OF COPD: ICD-10-CM

## 2023-06-26 DIAGNOSIS — Z72.0 TOBACCO USE: ICD-10-CM

## 2023-06-26 LAB
S PYO AG THROAT QL: NEGATIVE
SARS-COV-2 AG UPPER RESP QL IA: NEGATIVE
VALID CONTROL: ABNORMAL

## 2023-06-26 PROCEDURE — 99214 OFFICE O/P EST MOD 30 MIN: CPT | Performed by: NURSE PRACTITIONER

## 2023-06-26 PROCEDURE — G0463 HOSPITAL OUTPT CLINIC VISIT: HCPCS | Performed by: NURSE PRACTITIONER

## 2023-06-26 PROCEDURE — 71046 X-RAY EXAM CHEST 2 VIEWS: CPT

## 2023-06-26 PROCEDURE — 87811 SARS-COV-2 COVID19 W/OPTIC: CPT | Performed by: NURSE PRACTITIONER

## 2023-06-26 PROCEDURE — 87880 STREP A ASSAY W/OPTIC: CPT | Performed by: NURSE PRACTITIONER

## 2023-06-26 RX ORDER — ESTRADIOL 0.1 MG/G
CREAM VAGINAL
COMMUNITY
Start: 2023-06-13

## 2023-06-26 RX ORDER — ALBUTEROL SULFATE 90 UG/1
2 AEROSOL, METERED RESPIRATORY (INHALATION) EVERY 6 HOURS PRN
Qty: 8.5 G | Refills: 0 | Status: SHIPPED | OUTPATIENT
Start: 2023-06-26

## 2023-06-26 RX ORDER — PREDNISONE 10 MG/1
TABLET ORAL
Qty: 24 TABLET | Refills: 0 | Status: SHIPPED | OUTPATIENT
Start: 2023-06-26

## 2023-06-26 NOTE — PATIENT INSTRUCTIONS
Your xray was preliminarily read by your provider  A radiologist will read the xray and you will be notified if it is abnormal     Your strep A is negative  You are to do warm salt water gargles 4 x daily  Drink warm tea with honey and lemon  Take tylenol or motrin as able for pain or fever  Chloraseptic throat spray, cough drops  Do not share utensils  Change your tooth brush in 3 days  You have been prescribed prednisone for chest tightness, sorethroat  You have been prescribed an albuterol MDI for shortness of breath or wheezing  Stop smoking     Follow up with your PCP in 2-3 days  Go to the ED if symptoms worsen

## 2023-06-26 NOTE — PROGRESS NOTES
"  [facility]        NAME: Linda Verde is a 74 y o  female  : 1950    MRN: [id number]  DATE: 2023  TIME: 4:16 PM    Assessment and Plan   Viral upper respiratory tract infection with cough [J06 9]  1  Viral upper respiratory tract infection with cough  predniSONE 10 mg tablet    albuterol (ProAir HFA) 90 mcg/act inhaler      2  Sore throat  POCT rapid strepA      3  Acute cough  Poct Covid 19 Rapid Antigen Test    XR chest pa & lateral    predniSONE 10 mg tablet    albuterol (ProAir HFA) 90 mcg/act inhaler      4  History of COPD  predniSONE 10 mg tablet    albuterol (ProAir HFA) 90 mcg/act inhaler      5  Tobacco use  predniSONE 10 mg tablet    albuterol (ProAir HFA) 90 mcg/act inhaler            Patient Instructions       Follow up with PCP in 3-5 days  Proceed to  ER if symptoms worsen  Your xray was preliminarily read by your provider  A radiologist will read the xray and you will be notified if it is abnormal     Your strep A is negative  You are to do warm salt water gargles 4 x daily  Drink warm tea with honey and lemon  Take tylenol or motrin as able for pain or fever  Chloraseptic throat spray, cough drops  Do not share utensils  Change your tooth brush in 3 days  You have been prescribed prednisone for chest tightness, sorethroat  You have been prescribed an albuterol MDI for shortness of breath or wheezing  Stop smoking  Follow up with your PCP in 2-3 days  Go to the ED if symptoms worsen          Chief Complaint     Chief Complaint   Patient presents with   • Cough     C/o sore throat, nonproductive cough, and nasal congestion onset \"last week\"  Denies PCP visit  Utilizing Mucinex and Tylenol - reports no improvement  Denies fall/trauma  • Sore Throat   • Nasal Congestion         History of Present Illness       This is a 74year old female who states has sorethroat, non productive cough, nasal congestion that started 3 days ago   He has been taking mucinex " DM w/o relief  She denies fevers, chills, n/v/d   + tobacco use and COPD and uses an inhaler at times  She denies having a pulmonologist     She tried to get an appt with PCP but no answer  Review of Systems   Review of Systems   Constitutional: Negative  HENT: Positive for congestion and sore throat  Eyes: Negative  Respiratory: Positive for cough and chest tightness  Cardiovascular: Negative  Gastrointestinal: Negative  Endocrine: Negative  Genitourinary: Negative  Musculoskeletal: Negative  Skin: Negative  Allergic/Immunologic: Negative  Neurological: Negative  Hematological: Negative  Psychiatric/Behavioral: Negative  Current Medications       Current Outpatient Medications:   •  albuterol (ProAir HFA) 90 mcg/act inhaler, Inhale 2 puffs every 6 (six) hours as needed for wheezing or shortness of breath, Disp: 8 5 g, Rfl: 0  •  predniSONE 10 mg tablet, Take 5 tabs po x 2 days; 4 tabs po x 2 days; 3 tabs po x 1 day; 2 tabs po x 1 day   1 tab po x 1 day , Disp: 24 tablet, Rfl: 0  •  acetaminophen (TYLENOL) 650 mg CR tablet, Take 1 tablet (650 mg total) by mouth every 8 (eight) hours as needed for mild pain, Disp: 30 tablet, Rfl: 0  •  alendronate (Fosamax) 70 mg tablet, Take 1 tablet (70 mg total) by mouth every 7 days, Disp: 12 tablet, Rfl: 3  •  Ascorbic Acid (Vitamin C) 125 MG CHEW, Chew 1 tablet daily, Disp: , Rfl:   •  aspirin 81 mg chewable tablet, Chew 81 mg daily, Disp: , Rfl:   •  B Complex Vitamins (VITAMIN B COMPLEX 100 IJ), , Disp: , Rfl:   •  Calcium Carbonate-Vit D-Min (CALTRATE 600+D PLUS MINERALS PO), Take 1 tablet by mouth daily, Disp: , Rfl:   •  Cetirizine HCl (ZYRTEC ALLERGY PO), Take 1 tablet by mouth as needed  , Disp: , Rfl:   •  docusate sodium (COLACE) 100 mg capsule, Take 1 capsule (100 mg total) by mouth 2 (two) times a day, Disp: 30 capsule, Rfl: 0  •  estradiol (ESTRACE) 0 1 mg/g vaginal cream, , Disp: , Rfl:   •  ibuprofen (MOTRIN) 600 mg tablet, Take 1 tablet (600 mg total) by mouth every 6 (six) hours as needed for mild pain, Disp: 30 tablet, Rfl: 0  •  Multiple Vitamin (MULTI-VITAMIN DAILY) TABS, Take by mouth daily, Disp: , Rfl:   •  Multiple Vitamins-Minerals (HAIR SKIN NAILS PO), Take by mouth, Disp: , Rfl:   •  Trelegy Ellipta 200-62 5-25 MCG/INH AEPB inhaler, Inhale 1 puff daily, Disp: 60 blister, Rfl: 5  •  VITAMIN D PO, Take 1 tablet by mouth every other day, Disp: , Rfl:   •  Zinc Sulfate (ZINC-220 PO), Take 1 tablet by mouth daily, Disp: , Rfl:     Current Allergies     Allergies as of 06/26/2023   • (No Known Allergies)            The following portions of the patient's history were reviewed and updated as appropriate: allergies, current medications, past family history, past medical history, past social history, past surgical history and problem list      Past Medical History:   Diagnosis Date   • Amaurosis fugax 02/18/2021   • Basal cell carcinoma of skin 02/03/2020   • Clotting disorder (HCC)     + cardiolipins antibodies   • COPD (chronic obstructive pulmonary disease) ([facility] [address] )    • Tracheobronchitis 10/04/2021   • Transient ischemic attack 10/29/2020   • Vision disturbance 02/09/2021   • Vision loss, bilateral 02/09/2021       Past Surgical History:   Procedure Laterality Date   • HEMORRHOID SURGERY     • KY CMBND ANTERPOST COLPORRAPHY W/CYSTO N/A 4/24/2023    Procedure: A&P COLPORRHAPHY;  Surgeon: Chapis Carmona MD;  Location: [facility];  Service: UroGynecology          • KY COLPOPEXY VAGINAL EXTRAPERITONEAL APPROACH N/A 4/24/2023    Procedure: COLPOSUSPENSION VAGINAL EXTRAPERITONEAL(ENPLACE); Surgeon: Chapis Carmona MD;  Location: [facility];  Service: UroGynecology          • KY CYSTOURETHROSCOPY N/A 4/24/2023    Procedure: Jonna Hedy;  Surgeon: Chapis Carmona MD;  Location: [facility];  Service: UroGynecology          • KY SLING OPERATION STRESS INCONTINENCE N/A 4/24/2023    Procedure: PV SLING (RITESH);   Surgeon: Lolis Giron Lauren Gonzalez MD;  Location: [facility];  Service: UroGynecology              Family History   Problem Relation Age of Onset   • No Known Problems Mother    • No Known Problems Father    • No Known Problems Sister    • No Known Problems Daughter    • No Known Problems Maternal Grandmother    • No Known Problems Paternal Grandmother    • Breast cancer Maternal Uncle    • No Known Problems Maternal Aunt    • No Known Problems Maternal Aunt    • No Known Problems Paternal Aunt    • No Known Problems Paternal Aunt    • No Known Problems Paternal Aunt          Medications have been verified  Objective   /73 (BP Location: Right arm, Patient Position: Sitting)   Pulse 80   Temp 98 2 °F (36 8 °C) (Temporal)   Resp 18   LMP  (LMP Unknown)   SpO2 96%   No LMP recorded (lmp unknown)  Patient is postmenopausal        Physical Exam     Physical Exam  Vitals and nursing note reviewed  Constitutional:       General: She is not in acute distress  Appearance: She is well-developed and normal weight  She is not ill-appearing, toxic-appearing or diaphoretic  HENT:      Head: Normocephalic and atraumatic  Right Ear: Tympanic membrane and ear canal normal       Left Ear: Tympanic membrane and ear canal normal       Nose: Congestion and rhinorrhea present  Mouth/Throat:      Mouth: Mucous membranes are moist  No oral lesions  Pharynx: Oropharynx is clear  Uvula midline  No pharyngeal swelling, oropharyngeal exudate, posterior oropharyngeal erythema or uvula swelling  Tonsils: No tonsillar exudate or tonsillar abscesses  0 on the right  0 on the left  Eyes:      Extraocular Movements:      Right eye: Normal extraocular motion  Left eye: Normal extraocular motion  Cardiovascular:      Rate and Rhythm: Normal rate and regular rhythm  Heart sounds: Normal heart sounds  No murmur heard  Pulmonary:      Effort: Pulmonary effort is normal  No respiratory distress  Breath sounds:  No stridor  No wheezing, rhonchi or rales  Comments: Decreased breath sounds through out   Chest:      Chest wall: No tenderness  Musculoskeletal:      Cervical back: Normal range of motion and neck supple  Lymphadenopathy:      Cervical: No cervical adenopathy  Skin:     General: Skin is warm and dry  Capillary Refill: Capillary refill takes less than 2 seconds  Neurological:      General: No focal deficit present  Mental Status: She is alert and oriented to person, place, and time     Psychiatric:         Mood and Affect: Mood normal          Behavior: Behavior normal            Preliminary reading CXR  No acute process seen   Waiting on rad read

## 2023-06-29 ENCOUNTER — OFFICE VISIT (OUTPATIENT)
Dept: INTERNAL MEDICINE CLINIC | Facility: CLINIC | Age: 73
End: 2023-06-29
Payer: MEDICARE

## 2023-06-29 DIAGNOSIS — J40 TRACHEOBRONCHITIS: Primary | ICD-10-CM

## 2023-06-29 DIAGNOSIS — J44.9 SEVERE CHRONIC OBSTRUCTIVE PULMONARY DISEASE (HCC): ICD-10-CM

## 2023-06-29 PROCEDURE — 99213 OFFICE O/P EST LOW 20 MIN: CPT | Performed by: FAMILY MEDICINE

## 2023-06-29 RX ORDER — BENZONATATE 200 MG/1
200 CAPSULE ORAL 3 TIMES DAILY PRN
Qty: 20 CAPSULE | Refills: 0 | Status: SHIPPED | OUTPATIENT
Start: 2023-06-29

## 2023-06-29 RX ORDER — DOXYCYCLINE 100 MG/1
100 CAPSULE ORAL 2 TIMES DAILY
Qty: 20 CAPSULE | Refills: 0 | Status: SHIPPED | OUTPATIENT
Start: 2023-06-29 | End: 2023-07-09

## 2023-06-30 ENCOUNTER — APPOINTMENT (OUTPATIENT)
Dept: RADIOLOGY | Facility: CLINIC | Age: 73
End: 2023-06-30
Payer: MEDICARE

## 2023-06-30 ENCOUNTER — TELEPHONE (OUTPATIENT)
Dept: INTERNAL MEDICINE CLINIC | Facility: CLINIC | Age: 73
End: 2023-06-30

## 2023-06-30 VITALS — TEMPERATURE: 98.7 F | OXYGEN SATURATION: 95 % | HEART RATE: 84 BPM

## 2023-06-30 DIAGNOSIS — J40 TRACHEOBRONCHITIS: ICD-10-CM

## 2023-06-30 DIAGNOSIS — J44.9 SEVERE CHRONIC OBSTRUCTIVE PULMONARY DISEASE (HCC): ICD-10-CM

## 2023-06-30 PROCEDURE — 71046 X-RAY EXAM CHEST 2 VIEWS: CPT

## 2023-06-30 NOTE — TELEPHONE ENCOUNTER
Linda Horn [phone] I just wanted to let Doctor GEOVANNY [facility] know that when I went to the chest X-ray, the girl at the  said I was positive for COVID, so I don't know if I should get another test  So I had the X-ray done and I did find the pills for the cost at the pharmacy  They didn't fill them yesterday because the cost was not covered under my insurance, so I did get those filled  So anyway, if you need to speak to me, [phone]  And if you think I should get another COVID test, Thank you

## 2023-06-30 NOTE — PROGRESS NOTES
Assessment/Plan:    No problem-specific Assessment & Plan notes found for this encounter  Diagnoses and all orders for this visit:    Tracheobronchitis  -     doxycycline monohydrate (MONODOX) 100 mg capsule; Take 1 capsule (100 mg total) by mouth 2 (two) times a day for 10 days  -     benzonatate (TESSALON) 200 MG capsule; Take 1 capsule (200 mg total) by mouth 3 (three) times a day as needed for cough  -     XR chest pa & lateral; Future    Severe chronic obstructive pulmonary disease (HCC)  -     doxycycline monohydrate (MONODOX) 100 mg capsule; Take 1 capsule (100 mg total) by mouth 2 (two) times a day for 10 days  -     benzonatate (TESSALON) 200 MG capsule; Take 1 capsule (200 mg total) by mouth 3 (three) times a day as needed for cough  -     XR chest pa & lateral; Future     Orders and recommendations as noted above  Reviewed recent urgent care visit  Reviewed COVID and strep testing with her  She was informed that the COVID was negative and 1 portion of the COVID documentation is negative but another is positive  Discussed with her that this was likely documented wrong especially since she was told it was negative  Symptoms seem most consistent with a COPD exacerbation with possible early left-sided pneumonia  Continue with the prednisone  We will add doxycycline as noted above  Fluids  Rest   Use inhaler if needed  Discussed repeat chest x-ray  Watch for any persistent or worsening symptoms  Recommend emergency room evaluation if symptoms worsen  Subjective:      Patient ID: Mohan Bacon is a 74 y o  female  She presents for acute visit  Had been on vacation with her family last week  Had began with some respiratory symptoms with cough and some congestion as well as a sore throat  Symptoms had worsened a few days ago with increasing cough and chest congestion  Had significant sore throat as well    Had presented to the urgent care and had COVID and strep testing which were negative  Had chest x-ray which was also negative  Was started on prednisone  Symptoms have not improved significantly  Still feels short of breath  Still with a slight sore throat  Severe cough at times  Feeling more tired  Feeling hot at times but has not had a fever  Has some chest wall pain especially with coughing  Left side is worse than right  Hurts to take a deep breath at times  Denies any leg swelling  Daughter had similar symptoms when returning home  The following portions of the patient's history were reviewed and updated as appropriate:   She  has a past medical history of Amaurosis fugax (02/18/2021), Basal cell carcinoma of skin (02/03/2020), Clotting disorder ([facility] [address] COPD (chronic obstructive pulmonary disease) ([facility] ), Tracheobronchitis (10/04/2021), Transient ischemic attack (10/29/2020), Vision disturbance (02/09/2021), and Vision loss, bilateral (02/09/2021)    She   Patient Active Problem List    Diagnosis Date Noted   • Tracheobronchitis 06/29/2023   • Cystocele, midline 04/24/2023   • Pelvic muscle wasting 04/24/2023   • 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   • Stress incontinence (female) (male) 04/24/2023   • Postoperative state 04/24/2023   • Pre-op evaluation 04/17/2023   • Rectocele 02/06/2023   • Acquired trigger finger 01/26/2023   • Uterine prolapse 01/26/2023   • Mild protein-calorie malnutrition ([facility] ) 06/21/2021   • Age-related osteoporosis without current pathological fracture 06/21/2021   • Right carotid bruit 06/21/2021   • Mild mitral regurgitation 06/21/2021   • Anti-cardiolipin antibody positive 03/11/2021   • Compression fracture of body of [facility] vertebra ([facility] ) 02/18/2021   • Ectopic beat 02/18/2021   • Severe chronic obstructive pulmonary disease ([facility] ) 01/25/2021   • Hyperglycemia 04/27/2020   • Hyperlipidemia 04/27/2020   • Vitamin D deficiency 04/27/2020   • Smoker 02/03/2020     She  has a past surgical history that includes Hemorrhoid surgery; pr colpopexy vaginal extraperitoneal approach (N/A, 4/24/2023); pr cmbnd anterpost colporraphy w/cysto (N/A, 4/24/2023); pr sling operation stress incontinence (N/A, 4/24/2023); and pr cystourethroscopy (N/A, 4/24/2023)  Her family history includes Breast cancer in her maternal uncle; No Known Problems in her daughter, father, maternal aunt, maternal aunt, maternal grandmother, mother, paternal aunt, paternal aunt, paternal aunt, paternal grandmother, and sister  She  reports that she has been smoking cigarettes  She has a 29 00 pack-year smoking history  She has never used smokeless tobacco  She reports current alcohol use  She reports that she does not use drugs    Current Outpatient Medications   Medication Sig Dispense Refill   • benzonatate (TESSALON) 200 MG capsule Take 1 capsule (200 mg total) by mouth 3 (three) times a day as needed for cough 20 capsule 0   • doxycycline monohydrate (MONODOX) 100 mg capsule Take 1 capsule (100 mg total) by mouth 2 (two) times a day for 10 days 20 capsule 0   • acetaminophen (TYLENOL) 650 mg CR tablet Take 1 tablet (650 mg total) by mouth every 8 (eight) hours as needed for mild pain 30 tablet 0   • albuterol (ProAir HFA) 90 mcg/act inhaler Inhale 2 puffs every 6 (six) hours as needed for wheezing or shortness of breath 8 5 g 0   • alendronate (Fosamax) 70 mg tablet Take 1 tablet (70 mg total) by mouth every 7 days 12 tablet 3   • Ascorbic Acid (Vitamin C) 125 MG CHEW Chew 1 tablet daily     • aspirin 81 mg chewable tablet Chew 81 mg daily     • B Complex Vitamins (VITAMIN B COMPLEX 100 IJ)      • Calcium Carbonate-Vit D-Min (CALTRATE 600+D PLUS MINERALS PO) Take 1 tablet by mouth daily     • Cetirizine HCl (ZYRTEC ALLERGY PO) Take 1 tablet by mouth as needed       • docusate sodium (COLACE) 100 mg capsule Take 1 capsule (100 mg total) by mouth 2 (two) times a day 30 capsule 0   • estradiol (ESTRACE) 0 1 mg/g vaginal cream      • ibuprofen (MOTRIN) 600 mg tablet Take 1 tablet (600 mg total) by mouth every 6 (six) hours as needed for mild pain 30 tablet 0   • Multiple Vitamin (MULTI-VITAMIN DAILY) TABS Take by mouth daily     • Multiple Vitamins-Minerals (HAIR SKIN NAILS PO) Take by mouth     • predniSONE 10 mg tablet Take 5 tabs po x 2 days; 4 tabs po x 2 days; 3 tabs po x 1 day; 2 tabs po x 1 day  1 tab po x 1 day  24 tablet 0   • Trelegy Ellipta 200-62 5-25 MCG/INH AEPB inhaler Inhale 1 puff daily 60 blister 5   • VITAMIN D PO Take 1 tablet by mouth every other day     • Zinc Sulfate (ZINC-220 PO) Take 1 tablet by mouth daily       No current facility-administered medications for this visit  Current Outpatient Medications on File Prior to Visit   Medication Sig   • acetaminophen (TYLENOL) 650 mg CR tablet Take 1 tablet (650 mg total) by mouth every 8 (eight) hours as needed for mild pain   • albuterol (ProAir HFA) 90 mcg/act inhaler Inhale 2 puffs every 6 (six) hours as needed for wheezing or shortness of breath   • alendronate (Fosamax) 70 mg tablet Take 1 tablet (70 mg total) by mouth every 7 days   • Ascorbic Acid (Vitamin C) 125 MG CHEW Chew 1 tablet daily   • aspirin 81 mg chewable tablet Chew 81 mg daily   • B Complex Vitamins (VITAMIN B COMPLEX 100 IJ)    • Calcium Carbonate-Vit D-Min (CALTRATE 600+D PLUS MINERALS PO) Take 1 tablet by mouth daily   • Cetirizine HCl (ZYRTEC ALLERGY PO) Take 1 tablet by mouth as needed     • docusate sodium (COLACE) 100 mg capsule Take 1 capsule (100 mg total) by mouth 2 (two) times a day   • estradiol (ESTRACE) 0 1 mg/g vaginal cream    • ibuprofen (MOTRIN) 600 mg tablet Take 1 tablet (600 mg total) by mouth every 6 (six) hours as needed for mild pain   • Multiple Vitamin (MULTI-VITAMIN DAILY) TABS Take by mouth daily   • Multiple Vitamins-Minerals (HAIR SKIN NAILS PO) Take by mouth   • predniSONE 10 mg tablet Take 5 tabs po x 2 days; 4 tabs po x 2 days; 3 tabs po x 1 day; 2 tabs po x 1 day  1 tab po x 1 day     • Janki Rangel 089-95 5-58 MCG/INH AEPB inhaler Inhale 1 puff daily   • VITAMIN D PO Take 1 tablet by mouth every other day   • Zinc Sulfate (ZINC-220 PO) Take 1 tablet by mouth daily     No current facility-administered medications on file prior to visit  She has No Known Allergies       Review of Systems   Constitutional: Positive for activity change, fatigue and fever  Negative for chills and unexpected weight change  HENT: Positive for congestion and postnasal drip  Respiratory: Positive for cough and shortness of breath  Cardiovascular: Negative for chest pain and palpitations  Gastrointestinal: Negative for abdominal pain, diarrhea, nausea and vomiting  Musculoskeletal: Negative for arthralgias  Skin: Negative for rash  Allergic/Immunologic: Negative for immunocompromised state  Neurological: Negative for light-headedness  Objective:      Pulse 84   Temp 98 7 °F (37 1 °C)   LMP  (LMP Unknown)   SpO2 95%          Physical Exam  Vitals and nursing note reviewed  Constitutional:       General: She is not in acute distress  Appearance: She is well-developed and well-groomed  HENT:      Head:      Comments: Moist mucous membranes; slight posterior pharyngeal erythema  Cardiovascular:      Rate and Rhythm: Normal rate and regular rhythm  Heart sounds: No murmur heard  Pulmonary:      Breath sounds: Decreased breath sounds, wheezing and rhonchi present  Comments: Rhonchi noted left greater than right; slight expiratory wheeze  Musculoskeletal:      Cervical back: Neck supple  Neurological:      Mental Status: She is alert  Psychiatric:         Behavior: Behavior is cooperative

## 2023-07-03 NOTE — TELEPHONE ENCOUNTER
Can you call her and see how she is doing? She was told she was negative and looks like was documented incorrectly.

## 2023-07-24 ENCOUNTER — OFFICE VISIT (OUTPATIENT)
Dept: INTERNAL MEDICINE CLINIC | Facility: CLINIC | Age: 73
End: 2023-07-24
Payer: MEDICARE

## 2023-07-24 VITALS
DIASTOLIC BLOOD PRESSURE: 76 MMHG | HEART RATE: 72 BPM | OXYGEN SATURATION: 96 % | SYSTOLIC BLOOD PRESSURE: 130 MMHG | BODY MASS INDEX: 21.14 KG/M2 | WEIGHT: 119.3 LBS | HEIGHT: 63 IN | TEMPERATURE: 97.7 F

## 2023-07-24 DIAGNOSIS — M81.0 AGE-RELATED OSTEOPOROSIS WITHOUT CURRENT PATHOLOGICAL FRACTURE: ICD-10-CM

## 2023-07-24 DIAGNOSIS — E55.9 VITAMIN D DEFICIENCY: ICD-10-CM

## 2023-07-24 DIAGNOSIS — J44.9 SEVERE CHRONIC OBSTRUCTIVE PULMONARY DISEASE (HCC): Primary | ICD-10-CM

## 2023-07-24 DIAGNOSIS — S22.000A COMPRESSION FRACTURE OF BODY OF THORACIC VERTEBRA (HCC): ICD-10-CM

## 2023-07-24 DIAGNOSIS — E78.5 HYPERLIPIDEMIA, UNSPECIFIED HYPERLIPIDEMIA TYPE: ICD-10-CM

## 2023-07-24 PROBLEM — J40 TRACHEOBRONCHITIS: Status: RESOLVED | Noted: 2023-06-29 | Resolved: 2023-07-24

## 2023-07-24 PROCEDURE — 99214 OFFICE O/P EST MOD 30 MIN: CPT | Performed by: FAMILY MEDICINE

## 2023-07-24 PROCEDURE — G0439 PPPS, SUBSEQ VISIT: HCPCS | Performed by: FAMILY MEDICINE

## 2023-07-24 RX ORDER — ALENDRONATE SODIUM 70 MG/1
70 TABLET ORAL
Qty: 12 TABLET | Refills: 3 | Status: SHIPPED | OUTPATIENT
Start: 2023-07-24

## 2023-07-24 NOTE — PROGRESS NOTES
Assessment and Plan:     Problem List Items Addressed This Visit        Respiratory    Severe chronic obstructive pulmonary disease ([address]) - Primary    Relevant Orders    CBC and differential    Comprehensive metabolic panel       Musculoskeletal and Integument    Compression fracture of body of thoracic vertebra (HCC)    Relevant Medications    alendronate (Fosamax) 70 mg tablet    Other Relevant Orders    CBC and differential    Comprehensive metabolic panel    Age-related osteoporosis without current pathological fracture    Relevant Medications    alendronate (Fosamax) 70 mg tablet    Other Relevant Orders    CBC and differential    Comprehensive metabolic panel       Other    Hyperlipidemia    Relevant Orders    CBC and differential    Comprehensive metabolic panel    Lipid panel    TSH, 3rd generation    Vitamin D deficiency    Relevant Orders    CBC and differential    Comprehensive metabolic panel    Vitamin D 25 hydroxy     Orders and recommendations as noted above. Reviewed her current medications with her. Continue with the Fosamax. Be careful to avoid falls. Continue with calcium and vitamin D supplementation. Respiratory symptoms have improved significantly after recent illness. Continue with the Trelegy on a daily basis. Continue with the albuterol if needed. Watch for any worsening respiratory status. Continue with vitamin D supplementation. Slip given for laboratory testing. Cholesterol has been elevated in the past.  We will recheck this with upcoming laboratory testing. Continue with mammograms yearly. Recommend bone densities every other year. Discussed flu shot in the fall and she may consider this although she has not typically gotten this in the past.  We will have her follow-up in about 3 to 6 months or sooner if needed. Depression Screening and Follow-up Plan: Patient was screened for depression during today's encounter.  They screened negative with a PHQ-2 score of 0. Urinary Incontinence Plan of Care: counseling topics discussed: use restroom every 2 hours and limiting fluid intake 3-4 hours before bed. Preventive health issues were discussed with patient, and age appropriate screening tests were ordered as noted in patient's After Visit Summary. Personalized health advice and appropriate referrals for health education or preventive services given if needed, as noted in patient's After Visit Summary. History of Present Illness:     Patient presents for a Medicare Wellness Visit    She presents for routine follow-up as well as Medicare wellness visit. Has generally been doing better. Previous respiratory illness has resolved and she feels she is back to baseline. Cough has improved. Shortness of breath has improved. Continues with the Trelegy daily. Uses the albuterol if needed. Tolerating the Fosamax without difficulty. Denies any GI side effects with this. Appetite has been stable. Continues to work 4 days a week. No significant joint symptoms. Denies any nausea, vomiting, or diarrhea. Continues with vitamin D supplementation. Patient Care Team:  Mark Rogers MD as PCP - General (Family Medicine)  Sommer Gallegos CNM (Midwifery)     Review of Systems:     Review of Systems   Constitutional: Negative for activity change, appetite change, chills and fever. HENT: Negative for congestion and rhinorrhea. Eyes: Negative for visual disturbance. Respiratory: Positive for shortness of breath. Negative for cough and chest tightness. Cardiovascular: Negative for chest pain and palpitations. Gastrointestinal: Negative for abdominal pain, blood in stool, diarrhea, nausea and vomiting. Endocrine: Negative for polydipsia, polyphagia and polyuria. Genitourinary: Negative for dysuria, frequency and urgency. Musculoskeletal: Negative for gait problem. Skin: Negative for color change. Neurological: Negative for dizziness and headaches. Hematological: Does not bruise/bleed easily. Psychiatric/Behavioral: Negative for confusion and sleep disturbance. The patient is not nervous/anxious. Problem List:     Patient Active Problem List   Diagnosis   • Hyperglycemia   • Hyperlipidemia   • Severe chronic obstructive pulmonary disease (HCC)   • Smoker   • Vitamin D deficiency   • Compression fracture of body of thoracic vertebra (HCC)   • Ectopic beat   • Anti-cardiolipin antibody positive   • Mild protein-calorie malnutrition (HCC)   • Age-related osteoporosis without current pathological fracture   • Right carotid bruit   • Mild mitral regurgitation   • Acquired trigger finger   • Uterine prolapse   • Rectocele   • Pre-op evaluation   • Cystocele, midline   • Pelvic muscle wasting   • Hypermobility of urethra   • Stress incontinence (female) (male)   • Postoperative state      Past Medical and Surgical History:     Past Medical History:   Diagnosis Date   • Amaurosis fugax 02/18/2021   • Basal cell carcinoma of skin 02/03/2020   • Clotting disorder (HCC)     + cardiolipins antibodies   • COPD (chronic obstructive pulmonary disease) ([address])    • Tracheobronchitis 10/04/2021   • Transient ischemic attack 10/29/2020   • Vision disturbance 02/09/2021   • Vision loss, bilateral 02/09/2021     Past Surgical History:   Procedure Laterality Date   • HEMORRHOID SURGERY     • MS CMBND ANTERPOST COLPORRAPHY W/CYSTO N/A 4/24/2023    Procedure: A&P COLPORRHAPHY;  Surgeon: Noah Lock MD;  Location: [facility];  Service: UroGynecology          • MS COLPOPEXY VAGINAL EXTRAPERITONEAL APPROACH N/A 4/24/2023    Procedure: COLPOSUSPENSION VAGINAL EXTRAPERITONEAL(ENPLACE);   Surgeon: Noah Lock MD;  Location: [facility];  Service: UroGynecology          • MS CYSTOURETHROSCOPY N/A 4/24/2023    Procedure: Rusty Rico;  Surgeon: Noah Lock MD;  Location: [facility];  Service: UroGynecology          • MS SLING OPERATION STRESS INCONTINENCE N/A 4/24/2023 Procedure: PV SLING (RITESH); Surgeon: Joe Marsh MD;  Location: [facility] OR;  Service: UroGynecology             Family History:     Family History   Problem Relation Age of Onset   • No Known Problems Mother    • No Known Problems Father    • No Known Problems Sister    • No Known Problems Daughter    • No Known Problems Maternal Grandmother    • No Known Problems Paternal Grandmother    • Breast cancer Maternal Uncle    • No Known Problems Maternal Aunt    • No Known Problems Maternal Aunt    • No Known Problems Paternal Aunt    • No Known Problems Paternal Aunt    • No Known Problems Paternal Aunt       Social History:     Social History     Socioeconomic History   • Marital status:      Spouse name: None   • Number of children: None   • Years of education: None   • Highest education level: None   Occupational History   • None   Tobacco Use   • Smoking status: Every Day     Packs/day: 0.50     Years: 58.00     Total pack years: 29.00     Types: Cigarettes   • Smokeless tobacco: Never   • Tobacco comments:     Last smoked 4/22/23   Vaping Use   • Vaping Use: Never used   Substance and Sexual Activity   • Alcohol use: Yes     Comment: rarely   • Drug use: No   • Sexual activity: Not Currently     Partners: Male     Birth control/protection: Post-menopausal   Other Topics Concern   • None   Social History Narrative   • None     Social Determinants of Health     Financial Resource Strain: Low Risk  (7/24/2023)    Overall Financial Resource Strain (CARDIA)    • Difficulty of Paying Living Expenses: Not hard at all   Food Insecurity: Not on file   Transportation Needs: No Transportation Needs (7/24/2023)    PRAPARE - Transportation    • Lack of Transportation (Medical): No    • Lack of Transportation (Non-Medical):  No   Physical Activity: Not on file   Stress: Not on file   Social Connections: Not on file   Intimate Partner Violence: Not on file   Housing Stability: Not on file      Medications and Allergies:     Current Outpatient Medications   Medication Sig Dispense Refill   • acetaminophen (TYLENOL) 650 mg CR tablet Take 1 tablet (650 mg total) by mouth every 8 (eight) hours as needed for mild pain 30 tablet 0   • albuterol (ProAir HFA) 90 mcg/act inhaler Inhale 2 puffs every 6 (six) hours as needed for wheezing or shortness of breath 8.5 g 0   • alendronate (Fosamax) 70 mg tablet Take 1 tablet (70 mg total) by mouth every 7 days 12 tablet 3   • Ascorbic Acid (Vitamin C) 125 MG CHEW Chew 1 tablet daily     • aspirin 81 mg chewable tablet Chew 81 mg daily     • B Complex Vitamins (VITAMIN B COMPLEX 100 IJ)      • Calcium Carbonate-Vit D-Min (CALTRATE 600+D PLUS MINERALS PO) Take 1 tablet by mouth daily     • Cetirizine HCl (ZYRTEC ALLERGY PO) Take 1 tablet by mouth as needed       • docusate sodium (COLACE) 100 mg capsule Take 1 capsule (100 mg total) by mouth 2 (two) times a day 30 capsule 0   • estradiol (ESTRACE) 0.1 mg/g vaginal cream      • ibuprofen (MOTRIN) 600 mg tablet Take 1 tablet (600 mg total) by mouth every 6 (six) hours as needed for mild pain 30 tablet 0   • Multiple Vitamin (MULTI-VITAMIN DAILY) TABS Take by mouth daily     • Multiple Vitamins-Minerals (HAIR SKIN NAILS PO) Take by mouth     • Trelegy Ellipta 200-62.5-25 MCG/INH AEPB inhaler Inhale 1 puff daily 60 blister 5   • VITAMIN D PO Take 1 tablet by mouth every other day     • Zinc Sulfate (ZINC-220 PO) Take 1 tablet by mouth daily       No current facility-administered medications for this visit.      No Known Allergies   Immunizations:     Immunization History   Administered Date(s) Administered   • COVID-19 MODERNA VACC 0.5 ML IM 03/15/2021, 04/14/2021   • Pneumococcal Conjugate 13-Valent 11/22/2021   • Pneumococcal Polysaccharide PPV23 12/25/2014      Health Maintenance:         Topic Date Due   • Hepatitis C Screening  Never done   • Lung Cancer Screening  02/08/2022   • Colorectal Cancer Screening  07/19/2024   • Breast Cancer Screening: Mammogram  02/08/2025         Topic Date Due   • COVID-19 Vaccine (3 - Moderna series) 06/09/2021   • Influenza Vaccine (1) 09/01/2023      Medicare Screening Tests and Risk Assessments:     Preston Marks is here for her Subsequent Wellness visit. Last Medicare Wellness visit information reviewed, patient interviewed and updates made to the record today. Health Risk Assessment:   Patient rates overall health as excellent. Patient feels that their physical health rating is same. Patient is very satisfied with their life. Eyesight was rated as same. Hearing was rated as slightly worse. Patient feels that their emotional and mental health rating is same. Patients states they are never, rarely angry. Patient states they are sometimes unusually tired/fatigued. Pain experienced in the last 7 days has been none. Patient states that she has experienced no weight loss or gain in last 6 months. Depression Screening:   PHQ-2 Score: 0      Fall Risk Screening: In the past year, patient has experienced: no history of falling in past year      Urinary Incontinence Screening:   Patient has leaked urine accidently in the last six months. Home Safety:  Patient does not have trouble with stairs inside or outside of their home. Patient has working smoke alarms and has working carbon monoxide detector. Home safety hazards include: none. Nutrition:   Current diet is Regular. Medications:   Patient is not currently taking any over-the-counter supplements. Patient is able to manage medications. Activities of Daily Living (ADLs)/Instrumental Activities of Daily Living (IADLs):   Walk and transfer into and out of bed and chair?: Yes  Dress and groom yourself?: Yes    Bathe or shower yourself?: Yes    Feed yourself?  Yes  Do your laundry/housekeeping?: Yes  Manage your money, pay your bills and track your expenses?: Yes  Make your own meals?: Yes    Do your own shopping?: Yes    Previous Hospitalizations:   Any hospitalizations or ED visits within the last 12 months?: Yes    How many hospitalizations have you had in the last year?: 1-2    Advance Care Planning:   Living will: Yes    Durable POA for healthcare: Yes    Advanced directive: Yes    Provider agrees with end of life decisions: Yes      Cognitive Screening:   Provider or family/friend/caregiver concerned regarding cognition?: No    PREVENTIVE SCREENINGS      Cardiovascular Screening:    General: Screening Not Indicated and History Lipid Disorder      Diabetes Screening:     General: Screening Current      Colorectal Cancer Screening:     General: Screening Current      Breast Cancer Screening:     General: Screening Current      Cervical Cancer Screening:    General: Screening Not Indicated      Osteoporosis Screening:    General: Screening Not Indicated and History Osteoporosis      Abdominal Aortic Aneurysm (AAA) Screening:        General: Screening Not Indicated      Hepatitis C Screening:    General: Screening Not Indicated    Screening, Brief Intervention, and Referral to Treatment (SBIRT)    Screening  Typical number of drinks in a day: 0  Typical number of drinks in a week: 0  Interpretation: Low risk drinking behavior. AUDIT-C Screenin) How often did you have a drink containing alcohol in the past year? monthly or less  2) How many drinks did you have on a typical day when you were drinking in the past year? 0  3) How often did you have 6 or more drinks on one occasion in the past year? never    AUDIT-C Score: 1  Interpretation: Score 0-2 (female): Negative screen for alcohol misuse    Single Item Drug Screening:  How often have you used an illegal drug (including marijuana) or a prescription medication for non-medical reasons in the past year? never    Single Item Drug Screen Score: 0  Interpretation: Negative screen for possible drug use disorder    Brief Intervention  Alcohol & drug use screenings were reviewed.  No concerns regarding substance use disorder identified. No results found. Physical Exam:     /76 (BP Location: Left arm, Patient Position: Sitting, Cuff Size: Standard)   Pulse 72   Temp 97.7 °F (36.5 °C)   Ht 5' 3" (1.6 m)   Wt 54.1 kg (119 lb 4.8 oz)   LMP  (LMP Unknown)   SpO2 96%   BMI 21.13 kg/m²     Physical Exam  Vitals and nursing note reviewed. Constitutional:       General: She is not in acute distress. Appearance: She is well-developed and well-groomed. HENT:      Head: Normocephalic and atraumatic. Eyes:      Conjunctiva/sclera: Conjunctivae normal.   Cardiovascular:      Rate and Rhythm: Normal rate and regular rhythm. Heart sounds: No murmur heard. Pulmonary:      Effort: Pulmonary effort is normal. No respiratory distress. Breath sounds: Decreased breath sounds present. Abdominal:      Palpations: Abdomen is soft. Tenderness: There is no abdominal tenderness. Musculoskeletal:         General: No swelling. Cervical back: Neck supple. Skin:     General: Skin is warm and dry. Capillary Refill: Capillary refill takes less than 2 seconds. Neurological:      Mental Status: She is alert. Psychiatric:         Mood and Affect: Mood and affect normal.         Behavior: Behavior is cooperative.          Cognition and Memory: Cognition and memory normal.          Bernadette Allen MD

## 2023-07-24 NOTE — PATIENT INSTRUCTIONS
Medicare Preventive Visit Patient Instructions  Thank you for completing your Welcome to Medicare Visit or Medicare Annual Wellness Visit today. Your next wellness visit will be due in one year (7/24/2024). The screening/preventive services that you may require over the next 5-10 years are detailed below. Some tests may not apply to you based off risk factors and/or age. Screening tests ordered at today's visit but not completed yet may show as past due. Also, please note that scanned in results may not display below. Preventive Screenings:  Service Recommendations Previous Testing/Comments   Colorectal Cancer Screening  * Colonoscopy    * Fecal Occult Blood Test (FOBT)/Fecal Immunochemical Test (FIT)  * Fecal DNA/Cologuard Test  * Flexible Sigmoidoscopy Age: 43-73 years old   Colonoscopy: every 10 years (may be performed more frequently if at higher risk)  OR  FOBT/FIT: every 1 year  OR  Cologuard: every 3 years  OR  Sigmoidoscopy: every 5 years  Screening may be recommended earlier than age 39 if at higher risk for colorectal cancer. Also, an individualized decision between you and your healthcare provider will decide whether screening between the ages of 77-80 would be appropriate. Colonoscopy: Not on file  FOBT/FIT: Not on file  Cologuard: 07/19/2021  Sigmoidoscopy: Not on file    Screening Current     Breast Cancer Screening Age: 36 years old  Frequency: every 1-2 years  Not required if history of left and right mastectomy Mammogram: 02/08/2023    Screening Current   Cervical Cancer Screening Between the ages of 21-29, pap smear recommended once every 3 years. Between the ages of 32-69, can perform pap smear with HPV co-testing every 5 years.    Recommendations may differ for women with a history of total hysterectomy, cervical cancer, or abnormal pap smears in past. Pap Smear: 08/31/2021    Screening Not Indicated   Hepatitis C Screening Once for adults born between 1945 and 1965  More frequently in patients at high risk for Hepatitis C Hep C Antibody: Not on file        Diabetes Screening 1-2 times per year if you're at risk for diabetes or have pre-diabetes Fasting glucose: 85 mg/dL (3/30/2023)  A1C: 5.6 % (11/15/2021)  Screening Current   Cholesterol Screening Once every 5 years if you don't have a lipid disorder. May order more often based on risk factors. Lipid panel: 01/03/2023    Screening Not Indicated  History Lipid Disorder     Other Preventive Screenings Covered by Medicare:  1. Abdominal Aortic Aneurysm (AAA) Screening: covered once if your at risk. You're considered to be at risk if you have a family history of AAA. 2. Lung Cancer Screening: covers low dose CT scan once per year if you meet all of the following conditions: (1) Age 48-67; (2) No signs or symptoms of lung cancer; (3) Current smoker or have quit smoking within the last 15 years; (4) You have a tobacco smoking history of at least 20 pack years (packs per day multiplied by number of years you smoked); (5) You get a written order from a healthcare provider. 3. Glaucoma Screening: covered annually if you're considered high risk: (1) You have diabetes OR (2) Family history of glaucoma OR (3)  aged 48 and older OR (3)  American aged 72 and older  3. Osteoporosis Screening: covered every 2 years if you meet one of the following conditions: (1) You're estrogen deficient and at risk for osteoporosis based off medical history and other findings; (2) Have a vertebral abnormality; (3) On glucocorticoid therapy for more than 3 months; (4) Have primary hyperparathyroidism; (5) On osteoporosis medications and need to assess response to drug therapy. · Last bone density test (DXA Scan): 04/19/2021.  5. HIV Screening: covered annually if you're between the age of 15-65. Also covered annually if you are younger than 13 and older than 72 with risk factors for HIV infection.  For pregnant patients, it is covered up to 3 times per pregnancy. Immunizations:  Immunization Recommendations   Influenza Vaccine Annual influenza vaccination during flu season is recommended for all persons aged >= 6 months who do not have contraindications   Pneumococcal Vaccine   * Pneumococcal conjugate vaccine = PCV13 (Prevnar 13), PCV15 (Vaxneuvance), PCV20 (Prevnar 20)  * Pneumococcal polysaccharide vaccine = PPSV23 (Pneumovax) Adults 20-63 years old: 1-3 doses may be recommended based on certain risk factors  Adults 72 years old: 1-2 doses may be recommended based off what pneumonia vaccine you previously received   Hepatitis B Vaccine 3 dose series if at intermediate or high risk (ex: diabetes, end stage renal disease, liver disease)   Tetanus (Td) Vaccine - COST NOT COVERED BY MEDICARE PART B Following completion of primary series, a booster dose should be given every 10 years to maintain immunity against tetanus. Td may also be given as tetanus wound prophylaxis. Tdap Vaccine - COST NOT COVERED BY MEDICARE PART B Recommended at least once for all adults. For pregnant patients, recommended with each pregnancy. Shingles Vaccine (Shingrix) - COST NOT COVERED BY MEDICARE PART B  2 shot series recommended in those aged 48 and above     Health Maintenance Due:      Topic Date Due   • Hepatitis C Screening  Never done   • Lung Cancer Screening  02/08/2022   • Colorectal Cancer Screening  07/19/2024   • Breast Cancer Screening: Mammogram  02/08/2025     Immunizations Due:      Topic Date Due   • COVID-19 Vaccine (3 - Moderna series) 06/09/2021   • Influenza Vaccine (1) 09/01/2023     Advance Directives   What are advance directives? Advance directives are legal documents that state your wishes and plans for medical care. These plans are made ahead of time in case you lose your ability to make decisions for yourself. Advance directives can apply to any medical decision, such as the treatments you want, and if you want to donate organs.    What are the types of advance directives? There are many types of advance directives, and each state has rules about how to use them. You may choose a combination of any of the following:  · Living will: This is a written record of the treatment you want. You can also choose which treatments you do not want, which to limit, and which to stop at a certain time. This includes surgery, medicine, IV fluid, and tube feedings. · Durable power of  for [facility]): This is a written record that states who you want to make healthcare choices for you when you are unable to make them for yourself. This person, called a proxy, is usually a family member or a friend. You may choose more than 1 proxy. · Do not resuscitate (DNR) order:  A DNR order is used in case your heart stops beating or you stop breathing. It is a request not to have certain forms of treatment, such as CPR. A DNR order may be included in other types of advance directives. · Medical directive: This covers the care that you want if you are in a coma, near death, or unable to make decisions for yourself. You can list the treatments you want for each condition. Treatment may include pain medicine, surgery, blood transfusions, dialysis, IV or tube feedings, and a ventilator (breathing machine). · Values history: This document has questions about your views, beliefs, and how you feel and think about life. This information can help others choose the care that you would choose. Why are advance directives important? An advance directive helps you control your care. Although spoken wishes may be used, it is better to have your wishes written down. Spoken wishes can be misunderstood, or not followed. Treatments may be given even if you do not want them. An advance directive may make it easier for your family to make difficult choices about your care. Urinary Incontinence   Urinary incontinence (UI)  is when you lose control of your bladder.  UI develops because your bladder cannot store or empty urine properly. The 3 most common types of UI are stress incontinence, urge incontinence, or both. Medicines:   · May be given to help strengthen your bladder control. Report any side effects of medication to your healthcare provider. Do pelvic muscle exercises often:  Your pelvic muscles help you stop urinating. Squeeze these muscles tight for 5 seconds, then relax for 5 seconds. Gradually work up to squeezing for 10 seconds. Do 3 sets of 15 repetitions a day, or as directed. This will help strengthen your pelvic muscles and improve bladder control. Train your bladder:  Go to the bathroom at set times, such as every 2 hours, even if you do not feel the urge to go. You can also try to hold your urine when you feel the urge to go. For example, hold your urine for 5 minutes when you feel the urge to go. As that becomes easier, hold your urine for 10 minutes. Self-care:   · Keep a UI record. Write down how often you leak urine and how much you leak. Make a note of what you were doing when you leaked urine. · Drink liquids as directed. You may need to limit the amount of liquid you drink to help control your urine leakage. Do not drink any liquid right before you go to bed. Limit or do not have drinks that contain caffeine or alcohol. · Prevent constipation. Eat a variety of high-fiber foods. Good examples are high-fiber cereals, beans, vegetables, and whole-grain breads. Walking is the best way to trigger your intestines to have a bowel movement. · Exercise regularly and maintain a healthy weight. Weight loss and exercise will decrease pressure on your bladder and help you control your leakage. · Use a catheter as directed  to help empty your bladder. A catheter is a tiny, plastic tube that is put into your bladder to drain your urine. · Go to behavior therapy as directed.   Behavior therapy may be used to help you learn to control your urge to urinate. Cigarette Smoking and Your Health   Risks to your health if you smoke:  Nicotine and other chemicals found in tobacco damage every cell in your body. Even if you are a light smoker, you have an increased risk for cancer, heart disease, and lung disease. If you are pregnant or have diabetes, smoking increases your risk for complications. Benefits to your health if you stop smoking:   · You decrease respiratory symptoms such as coughing, wheezing, and shortness of breath. · You reduce your risk for cancers of the lung, mouth, throat, kidney, bladder, pancreas, stomach, and cervix. If you already have cancer, you increase the benefits of chemotherapy. You also reduce your risk for cancer returning or a second cancer from developing. · You reduce your risk for heart disease, blood clots, heart attack, and stroke. · You reduce your risk for lung infections, and diseases such as pneumonia, asthma, chronic bronchitis, and emphysema. · Your circulation improves. More oxygen can be delivered to your body. If you have diabetes, you lower your risk for complications, such as kidney, artery, and eye diseases. You also lower your risk for nerve damage. Nerve damage can lead to amputations, poor vision, and blindness. · You improve your body's ability to heal and to fight infections. For more information and support to stop smoking:   · Smoke[employer]. gov  Phone: 1- 482 - 544-9015  Web Address: www.[employer]. [employer] (app) [address] Information is for End User's use only and may not be sold, redistributed or otherwise used for commercial purposes.  All illustrations and images included in CareNotes® are the copyrighted property of [employer] Inc. or  [employer]

## 2023-09-08 NOTE — PROGRESS NOTES
[facility] [address] [place] [address]    ASSESSMENT/PLAN: Sowmya Sharma is a 74 y.o. [id number] who presents for annual gynecologic exam.    Encounter for routine gynecologic examination  - Routine well woman exam completed today. - Cervical Cancer Screening complete   - STI screening offered including HIV: not indicated based on hx or requested at time of visit  - Breast Cancer Screening: Last Mammogram 02/08/2023, script given  - Colorectal cancer screening was not ordered. Cologuard 2021- due next year  - The following were reviewed in today's visit: breast self exam, mammography screening ordered, osteoporosis, adequate intake of calcium and vitamin D, exercise and healthy diet  - continue use of vaginal estrogen. - Follow-up with Dr Gustavo Levi in Nov.   - next visit 2 yrs    Additional problems addressed at this visit:  1. Encounter for annual routine gynecological examination  -     Mammo screening bilateral w 3d & cad; Future; Expected date: 02/08/2024    2. Encounter for screening mammogram for breast cancer  -     Mammo screening bilateral w 3d & cad; Future; Expected date: 02/08/2024          CC: Annual Gynecologic Examination    HPI: Sowmya Sharma is a 74 y.o. [id number] who presents for annual gynecologic examination. Tonio Ingram presents today for gyn exam. No vaginal bleeding since onset of menopause. 8/2021 last pap smear- normal, Hx of abnormal pap smear no. Sexually active- no.  2/2023 mammogram- normal, and 7/2021 Cologuard- negative- 3 yrs repeat. Reports 7-8 hrs of sleep daily, 1-2 servings of calcium rich foods daily- takes calcium supplement. Exercises : no routine exercise- active daily. 1 servings of caffeine daily. Occasionally SBE. Safe at home- yes. Wears seatbelt - yes Concerns: Had surgery with Dr Gustavo Levi in April 2023. No longer having any leakage or incontinence. No vaginal discharge. No difficulty initiating stream of urine.          The following portions of the patient's history were reviewed and updated as appropriate: allergies, current medications, past family history, past medical history, obstetric history, gynecologic history, past social history, past surgical history and problem list.    Review of Systems   Constitutional: Negative for chills, fatigue and fever. Respiratory: Negative for cough and shortness of breath. Cardiovascular: Negative for chest pain, palpitations and leg swelling. Gastrointestinal: Negative for constipation and diarrhea. Genitourinary: Negative for difficulty urinating, dysuria, frequency, pelvic pain, urgency, vaginal bleeding, vaginal discharge and vaginal pain. Neurological: Negative for light-headedness and headaches. Psychiatric/Behavioral: The patient is not nervous/anxious. Objective:  /74   Ht 5' 3" (1.6 m)   Wt 54.1 kg (119 lb 3.2 oz)   LMP  (LMP Unknown)   BMI 21.12 kg/m²    Physical Exam  Vitals reviewed. Constitutional:       Appearance: Normal appearance. HENT:      Head: Normocephalic. Neck:      Thyroid: No thyroid mass or thyroid tenderness. Cardiovascular:      Rate and Rhythm: Normal rate and regular rhythm. Heart sounds: Normal heart sounds. Pulmonary:      Effort: Pulmonary effort is normal.      Breath sounds: Normal breath sounds. Chest:   Breasts:     Right: No mass, nipple discharge, skin change or tenderness. Left: No mass, nipple discharge, skin change or tenderness. Abdominal:      General: There is no distension. Palpations: There is no mass. Tenderness: There is no abdominal tenderness. There is no guarding. Genitourinary:     General: Normal vulva. Exam position: Lithotomy position. Labia:         Right: No tenderness or lesion. Left: No tenderness or lesion. Vagina: No vaginal discharge, tenderness, bleeding or lesions. Cervix: No discharge, lesion, erythema or cervical bleeding.       Uterus: Normal. Not enlarged and not tender. Adnexa:         Right: No mass, tenderness or fullness. Left: No mass, tenderness or fullness. Comments: Well healed. No discharge, redness or lesions. Musculoskeletal:      Cervical back: Normal range of motion. Lymphadenopathy:      Upper Body:      Right upper body: No axillary adenopathy. Left upper body: No axillary adenopathy. Skin:     General: Skin is warm and dry. Neurological:      Mental Status: She is alert.    Psychiatric:         Mood and Affect: Mood normal.         Behavior: Behavior normal.         Judgment: Judgment normal.             Marina Powell CNM  [facility]  09/11/23 10:23 AM

## 2023-09-11 ENCOUNTER — ANNUAL EXAM (OUTPATIENT)
Dept: OBGYN CLINIC | Facility: CLINIC | Age: 73
End: 2023-09-11
Payer: MEDICARE

## 2023-09-11 VITALS
SYSTOLIC BLOOD PRESSURE: 136 MMHG | HEIGHT: 63 IN | DIASTOLIC BLOOD PRESSURE: 74 MMHG | WEIGHT: 119.2 LBS | BODY MASS INDEX: 21.12 KG/M2

## 2023-09-11 DIAGNOSIS — Z01.419 ENCOUNTER FOR ANNUAL ROUTINE GYNECOLOGICAL EXAMINATION: Primary | ICD-10-CM

## 2023-09-11 DIAGNOSIS — Z12.31 ENCOUNTER FOR SCREENING MAMMOGRAM FOR BREAST CANCER: ICD-10-CM

## 2023-09-11 PROBLEM — N39.3 STRESS INCONTINENCE (FEMALE) (MALE): Status: RESOLVED | Noted: 2023-04-24 | Resolved: 2023-09-11

## 2023-09-11 PROBLEM — N81.11 CYSTOCELE, MIDLINE: Status: RESOLVED | Noted: 2023-04-24 | Resolved: 2023-09-11

## 2023-09-11 PROBLEM — N81.6 RECTOCELE: Status: RESOLVED | Noted: 2023-02-06 | Resolved: 2023-09-11

## 2023-09-11 PROBLEM — N36.41 HYPERMOBILITY OF URETHRA: Status: RESOLVED | Noted: 2023-04-24 | Resolved: 2023-09-11

## 2023-09-11 PROBLEM — N81.4 UTERINE PROLAPSE: Status: RESOLVED | Noted: 2023-01-26 | Resolved: 2023-09-11

## 2023-09-11 PROCEDURE — G0101 CA SCREEN;PELVIC/BREAST EXAM: HCPCS | Performed by: ADVANCED PRACTICE MIDWIFE

## 2023-09-11 RX ORDER — UBIDECARENONE 75 MG
CAPSULE ORAL DAILY
COMMUNITY

## 2023-09-25 ENCOUNTER — TELEPHONE (OUTPATIENT)
Dept: INTERNAL MEDICINE CLINIC | Facility: CLINIC | Age: 73
End: 2023-09-25

## 2023-09-26 ENCOUNTER — TELEPHONE (OUTPATIENT)
Dept: INTERNAL MEDICINE CLINIC | Facility: CLINIC | Age: 73
End: 2023-09-26

## 2023-09-26 DIAGNOSIS — D49.2 ABNORMAL SKIN GROWTH: Primary | ICD-10-CM

## 2023-09-26 NOTE — TELEPHONE ENCOUNTER
Patient called asking if you could recommend a dermatologist for her she has a round crusty [place] on her back and a growth on her ear lobe .      Provider recommended [facility]

## 2023-10-05 ENCOUNTER — APPOINTMENT (RX ONLY)
Dept: URBAN - NONMETROPOLITAN AREA CLINIC 4 | Facility: CLINIC | Age: 73
Setting detail: DERMATOLOGY
End: 2023-10-05

## 2023-10-05 DIAGNOSIS — L82.0 INFLAMED SEBORRHEIC KERATOSIS: ICD-10-CM

## 2023-10-05 PROCEDURE — 17110 DESTRUCTION B9 LES UP TO 14: CPT

## 2023-10-05 PROCEDURE — ? LIQUID NITROGEN

## 2023-10-05 PROCEDURE — ? COUNSELING

## 2023-10-05 ASSESSMENT — LOCATION DETAILED DESCRIPTION DERM
LOCATION DETAILED: RIGHT LATERAL ABDOMEN
LOCATION DETAILED: LEFT SUPERIOR MEDIAL UPPER BACK
LOCATION DETAILED: RIGHT LATERAL DORSAL FOOT
LOCATION DETAILED: RIGHT SUPERIOR HELIX
LOCATION DETAILED: RIGHT MID-UPPER BACK
LOCATION DETAILED: LEFT POSTERIOR SHOULDER

## 2023-10-05 ASSESSMENT — LOCATION ZONE DERM
LOCATION ZONE: ARM
LOCATION ZONE: TRUNK
LOCATION ZONE: EAR
LOCATION ZONE: FEET

## 2023-10-05 ASSESSMENT — LOCATION SIMPLE DESCRIPTION DERM
LOCATION SIMPLE: RIGHT FOOT
LOCATION SIMPLE: LEFT SHOULDER
LOCATION SIMPLE: RIGHT UPPER BACK
LOCATION SIMPLE: RIGHT EAR
LOCATION SIMPLE: ABDOMEN
LOCATION SIMPLE: LEFT UPPER BACK

## 2023-10-05 NOTE — PROCEDURE: LIQUID NITROGEN
Consent: The patient's consent was obtained including but not limited to risks of crusting, scabbing, blistering, scarring, darker or lighter pigmentary change, recurrence, incomplete removal and infection.
Add 52 Modifier (Optional): no
Post-Care Instructions: I reviewed with the patient in detail post-care instructions. Patient is to wear sunprotection, and avoid picking at any of the treated lesions. Pt may apply Vaseline to crusted or scabbing areas.
Duration Of Freeze Thaw-Cycle (Seconds): 5-10
Medical Necessity Information: It is in your best interest to select a reason for this procedure from the list below. All of these items fulfill various CMS LCD requirements except the new and changing color options.
Render Post-Care Instructions In Note?: yes
Number Of Freeze-Thaw Cycles: 1 freeze-thaw cycle
Spray Paint Text: The liquid nitrogen was applied to the skin utilizing a spray paint frosting technique.
Medical Necessity Clause: This procedure was medically necessary because the lesions that were treated were:
Detail Level: Zone

## 2023-11-07 ENCOUNTER — APPOINTMENT (RX ONLY)
Dept: URBAN - NONMETROPOLITAN AREA CLINIC 4 | Facility: CLINIC | Age: 73
Setting detail: DERMATOLOGY
End: 2023-11-07

## 2023-11-07 DIAGNOSIS — L82.0 INFLAMED SEBORRHEIC KERATOSIS: ICD-10-CM

## 2023-11-07 PROCEDURE — 17110 DESTRUCTION B9 LES UP TO 14: CPT

## 2023-11-07 PROCEDURE — ? MEDICARE ABN

## 2023-11-07 PROCEDURE — ? LIQUID NITROGEN

## 2023-11-07 PROCEDURE — ? COUNSELING

## 2023-11-07 ASSESSMENT — LOCATION ZONE DERM
LOCATION ZONE: LEG
LOCATION ZONE: ARM

## 2023-11-07 ASSESSMENT — LOCATION DETAILED DESCRIPTION DERM
LOCATION DETAILED: RIGHT DISTAL PRETIBIAL REGION
LOCATION DETAILED: LEFT PROXIMAL PRETIBIAL REGION
LOCATION DETAILED: LEFT ANTERIOR SHOULDER

## 2023-11-07 ASSESSMENT — LOCATION SIMPLE DESCRIPTION DERM
LOCATION SIMPLE: LEFT PRETIBIAL REGION
LOCATION SIMPLE: RIGHT PRETIBIAL REGION
LOCATION SIMPLE: LEFT SHOULDER

## 2023-11-07 NOTE — PROCEDURE: MEDICARE ABN
Detail Level: Zone
Reason?: non-covered service
Payment Option: Option 1: Bill Medicare, await for decision on payment.
Reason?: Additional Information
Procedure (Limit To 20 Characters): benign destruction

## 2023-11-07 NOTE — PROCEDURE: LIQUID NITROGEN
Show Applicator Variable?: Yes
Add 52 Modifier (Optional): no
Consent: The patient's consent was obtained including but not limited to risks of crusting, scabbing, blistering, scarring, darker or lighter pigmentary change, recurrence, incomplete removal and infection.
Number Of Freeze-Thaw Cycles: 1 freeze-thaw cycle
Medical Necessity Clause: This procedure was medically necessary because the lesions that were treated were:
Detail Level: Zone
Post-Care Instructions: I reviewed with the patient in detail post-care instructions. Patient is to wear sunprotection, and avoid picking at any of the treated lesions. Pt may apply Vaseline to crusted or scabbing areas.
Spray Paint Text: The liquid nitrogen was applied to the skin utilizing a spray paint frosting technique.
Medical Necessity Information: It is in your best interest to select a reason for this procedure from the list below. All of these items fulfill various CMS LCD requirements except the new and changing color options.

## 2023-11-16 ENCOUNTER — APPOINTMENT (OUTPATIENT)
Age: 73
End: 2023-11-16
Payer: MEDICARE

## 2023-11-16 DIAGNOSIS — S22.000A COMPRESSION FRACTURE OF BODY OF THORACIC VERTEBRA (HCC): ICD-10-CM

## 2023-11-16 DIAGNOSIS — E78.5 HYPERLIPIDEMIA, UNSPECIFIED HYPERLIPIDEMIA TYPE: ICD-10-CM

## 2023-11-16 DIAGNOSIS — E55.9 VITAMIN D DEFICIENCY: ICD-10-CM

## 2023-11-16 DIAGNOSIS — J44.9 SEVERE CHRONIC OBSTRUCTIVE PULMONARY DISEASE (HCC): ICD-10-CM

## 2023-11-16 DIAGNOSIS — M81.0 AGE-RELATED OSTEOPOROSIS WITHOUT CURRENT PATHOLOGICAL FRACTURE: ICD-10-CM

## 2023-11-16 LAB
25(OH)D3 SERPL-MCNC: 65.3 NG/ML (ref 30–100)
ALBUMIN SERPL BCP-MCNC: 3.9 G/DL (ref 3.5–5)
ALP SERPL-CCNC: 79 U/L (ref 34–104)
ALT SERPL W P-5'-P-CCNC: 15 U/L (ref 7–52)
ANION GAP SERPL CALCULATED.3IONS-SCNC: 6 MMOL/L
AST SERPL W P-5'-P-CCNC: 19 U/L (ref 13–39)
BASOPHILS # BLD AUTO: 0.09 THOUSANDS/ÂΜL (ref 0–0.1)
BASOPHILS NFR BLD AUTO: 1 % (ref 0–1)
BILIRUB SERPL-MCNC: 0.29 MG/DL (ref 0.2–1)
BUN SERPL-MCNC: 15 MG/DL (ref 5–25)
CALCIUM SERPL-MCNC: 8.6 MG/DL (ref 8.4–10.2)
CHLORIDE SERPL-SCNC: 104 MMOL/L (ref 96–108)
CHOLEST SERPL-MCNC: 178 MG/DL
CO2 SERPL-SCNC: 28 MMOL/L (ref 21–32)
CREAT SERPL-MCNC: 0.65 MG/DL (ref 0.6–1.3)
EOSINOPHIL # BLD AUTO: 0.53 THOUSAND/ÂΜL (ref 0–0.61)
EOSINOPHIL NFR BLD AUTO: 7 % (ref 0–6)
ERYTHROCYTE [DISTWIDTH] IN BLOOD BY AUTOMATED COUNT: 15.8 % (ref 11.6–15.1)
GFR SERPL CREATININE-BSD FRML MDRD: 87 ML/MIN/1.73SQ M
GLUCOSE P FAST SERPL-MCNC: 83 MG/DL (ref 65–99)
HCT VFR BLD AUTO: 40.1 % (ref 34.8–46.1)
HDLC SERPL-MCNC: 68 MG/DL
HGB BLD-MCNC: 13.2 G/DL (ref 11.5–15.4)
IMM GRANULOCYTES # BLD AUTO: 0.02 THOUSAND/UL (ref 0–0.2)
IMM GRANULOCYTES NFR BLD AUTO: 0 % (ref 0–2)
LDLC SERPL CALC-MCNC: 95 MG/DL (ref 0–100)
LYMPHOCYTES # BLD AUTO: 2.74 THOUSANDS/ÂΜL (ref 0.6–4.47)
LYMPHOCYTES NFR BLD AUTO: 35 % (ref 14–44)
MCH RBC QN AUTO: 30.1 PG (ref 26.8–34.3)
MCHC RBC AUTO-ENTMCNC: 32.9 G/DL (ref 31.4–37.4)
MCV RBC AUTO: 91 FL (ref 82–98)
MONOCYTES # BLD AUTO: 0.81 THOUSAND/ÂΜL (ref 0.17–1.22)
MONOCYTES NFR BLD AUTO: 10 % (ref 4–12)
NEUTROPHILS # BLD AUTO: 3.76 THOUSANDS/ÂΜL (ref 1.85–7.62)
NEUTS SEG NFR BLD AUTO: 47 % (ref 43–75)
NONHDLC SERPL-MCNC: 110 MG/DL
NRBC BLD AUTO-RTO: 0 /100 WBCS
PLATELET # BLD AUTO: 264 THOUSANDS/UL (ref 149–390)
PMV BLD AUTO: 9.6 FL (ref 8.9–12.7)
POTASSIUM SERPL-SCNC: 4.2 MMOL/L (ref 3.5–5.3)
PROT SERPL-MCNC: 6.7 G/DL (ref 6.4–8.4)
RBC # BLD AUTO: 4.39 MILLION/UL (ref 3.81–5.12)
SODIUM SERPL-SCNC: 138 MMOL/L (ref 135–147)
TRIGL SERPL-MCNC: 76 MG/DL
TSH SERPL DL<=0.05 MIU/L-ACNC: 2.7 UIU/ML (ref 0.45–4.5)
WBC # BLD AUTO: 7.95 THOUSAND/UL (ref 4.31–10.16)

## 2023-11-16 PROCEDURE — 85025 COMPLETE CBC W/AUTO DIFF WBC: CPT

## 2023-11-16 PROCEDURE — 36415 COLL VENOUS BLD VENIPUNCTURE: CPT

## 2023-11-16 PROCEDURE — 82306 VITAMIN D 25 HYDROXY: CPT

## 2023-11-16 PROCEDURE — 84443 ASSAY THYROID STIM HORMONE: CPT

## 2023-11-16 PROCEDURE — 80061 LIPID PANEL: CPT

## 2023-11-16 PROCEDURE — 80053 COMPREHEN METABOLIC PANEL: CPT

## 2023-11-20 ENCOUNTER — OFFICE VISIT (OUTPATIENT)
Dept: INTERNAL MEDICINE CLINIC | Facility: CLINIC | Age: 73
End: 2023-11-20
Payer: MEDICARE

## 2023-11-20 VITALS
DIASTOLIC BLOOD PRESSURE: 78 MMHG | HEART RATE: 75 BPM | SYSTOLIC BLOOD PRESSURE: 132 MMHG | WEIGHT: 120.5 LBS | HEIGHT: 63 IN | TEMPERATURE: 97.4 F | OXYGEN SATURATION: 99 % | BODY MASS INDEX: 21.35 KG/M2

## 2023-11-20 DIAGNOSIS — E55.9 VITAMIN D DEFICIENCY: ICD-10-CM

## 2023-11-20 DIAGNOSIS — M81.0 AGE-RELATED OSTEOPOROSIS WITHOUT CURRENT PATHOLOGICAL FRACTURE: ICD-10-CM

## 2023-11-20 DIAGNOSIS — E78.5 HYPERLIPIDEMIA, UNSPECIFIED HYPERLIPIDEMIA TYPE: ICD-10-CM

## 2023-11-20 DIAGNOSIS — J44.9 SEVERE CHRONIC OBSTRUCTIVE PULMONARY DISEASE (HCC): Primary | ICD-10-CM

## 2023-11-20 PROCEDURE — 99214 OFFICE O/P EST MOD 30 MIN: CPT | Performed by: FAMILY MEDICINE

## 2023-11-20 NOTE — PROGRESS NOTES
Assessment/Plan:       1. Severe chronic obstructive pulmonary disease (HCC)  Assessment & Plan:  Breathing stable. Continue with the Trelegy. Use the albuterol if needed. Orders:  -     CBC and differential; Future    2. Vitamin D deficiency  -     CBC and differential; Future  -     Vitamin D 25 hydroxy; Future    3. Hyperlipidemia, unspecified hyperlipidemia type  Assessment & Plan:  Watch diet. Increase fiber in diet. Try to remain as active as possible. Orders:  -     CBC and differential; Future  -     Comprehensive metabolic panel; Future  -     Lipid panel; Future  -     TSH, 3rd generation; Future    4. Age-related osteoporosis without current pathological fracture  Assessment & Plan:  Be very careful to avoid falls. Continue with the calcium and vitamin D. Continue with the Fosamax. Orders:  -     CBC and differential; Future        Sciatica. I suspect it is a component of arthritis and sciatica. I will prescribe a short course of prednisone. She was advised not to take Motrin, Advil, or Aleve while taking prednisone. If it does not get better, the next step would be to get an x-ray. Immunization  Discussed with the patient about RSV and recommended the vaccine. Orders and recommendations as noted above. Will have her follow-up in about 3 to 6 months or sooner if needed. Subjective:      Patient ID: Yesi Chung is a 74 y.o. female who presents for routine follow-up. Her breathing is good. She is congested from sleeping when she gets up in the morning. She has nasal drip, which goes away by the time she takes a shower. She suspects having sciatica which started last week. She could not distinguish if she did anything that triggered it. The pain radiates down her thigh. She does not like to take pills, but she takes Aleve as needed. Her back does not wake her up at night.  She gets up once or twice to go to the bathroom, but she usually can fall right back to sleep.    Her appetite has been good. She eats a lot. She was heavier in her teens. She weighed 110 or 111 pounds at one point after her  passed away. She attributes her weight loss to stress. She denies any side effects with Fosamax and takes it every Sunday. The patient does had and not want any vaccine. The following portions of the patient's history were reviewed and updated as appropriate: She  has a past medical history of Amaurosis fugax (02/18/2021), Basal cell carcinoma of skin (02/03/2020), Cancer [facility]), Clotting disorder ([address]), COPD (chronic obstructive pulmonary disease) ([address]), Hypertension (October 2020), Tracheobronchitis (10/04/2021), Transient ischemic attack (10/29/2020), Vision disturbance (02/09/2021), and Vision loss, bilateral (02/09/2021). 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/11/2021   • Compression fracture of body of thoracic vertebra ([address]) 02/18/2021   • Ectopic beat 02/18/2021   • Severe chronic obstructive pulmonary disease ([address]) 01/25/2021   • Hyperglycemia 04/27/2020   • Hyperlipidemia 04/27/2020   • Vitamin D deficiency 04/27/2020   • Smoker 02/03/2020     She  has a past surgical history that includes Hemorrhoid surgery; pr colpopexy vaginal extraperitoneal approach (N/A, 4/24/2023); pr cmbnd anterpost colporraphy w/cysto (N/A, 4/24/2023); pr sling operation stress incontinence (N/A, 4/24/2023); and pr cystourethroscopy (N/A, 4/24/2023). Her family history includes Breast cancer in her maternal uncle;  Cancer in her brother, brother, and son; Deep vein thrombosis in her son; No Known Problems in her daughter, maternal aunt, maternal aunt, maternal grandmother, mother, paternal aunt, paternal aunt, paternal aunt, paternal grandmother, and sister; Stroke in her father. She  reports that she has been smoking cigarettes. She has a 15.00 pack-year smoking history. She has never used smokeless tobacco. She reports current alcohol use. She reports that she does not use drugs. Current Outpatient Medications   Medication Sig Dispense Refill   • acetaminophen (TYLENOL) 650 mg CR tablet Take 1 tablet (650 mg total) by mouth every 8 (eight) hours as needed for mild pain 30 tablet 0   • alendronate (Fosamax) 70 mg tablet Take 1 tablet (70 mg total) by mouth every 7 days 12 tablet 3   • Ascorbic Acid (Vitamin C) 125 MG CHEW Chew 1 tablet daily     • aspirin 81 mg chewable tablet Chew 81 mg daily     • B Complex Vitamins (VITAMIN B COMPLEX 100 IJ)      • Calcium Carbonate-Vit D-Min (CALTRATE 600+D PLUS MINERALS PO) Take 1 tablet by mouth daily     • Cetirizine HCl (ZYRTEC ALLERGY PO) Take 1 tablet by mouth as needed       • Cranberry 1000 MG CAPS Take by mouth     • cyanocobalamin (VITAMIN B-12) 100 mcg tablet Take by mouth daily     • docusate sodium (COLACE) 100 mg capsule Take 1 capsule (100 mg total) by mouth 2 (two) times a day 30 capsule 0   • estradiol (ESTRACE) 0.1 mg/g vaginal cream      • Multiple Vitamin (MULTI-VITAMIN DAILY) TABS Take by mouth daily     • Multiple Vitamins-Minerals (HAIR SKIN NAILS PO) Take by mouth     • Trelegy Ellipta 200-62.5-25 MCG/INH AEPB inhaler Inhale 1 puff daily 60 blister 5   • VITAMIN D PO Take 1 tablet by mouth every other day     • Zinc Sulfate (ZINC-220 PO) Take 1 tablet by mouth daily       No current facility-administered medications for this visit.      Current Outpatient Medications on File Prior to Visit   Medication Sig   • acetaminophen (TYLENOL) 650 mg CR tablet Take 1 tablet (650 mg total) by mouth every 8 (eight) hours as needed for mild pain   • alendronate (Fosamax) 70 mg tablet Take 1 tablet (70 mg total) by mouth every 7 days   • Ascorbic Acid (Vitamin C) 125 MG CHEW Chew 1 tablet daily   • aspirin 81 mg chewable tablet Chew 81 mg daily   • B Complex Vitamins (VITAMIN B COMPLEX 100 IJ)    • Calcium Carbonate-Vit D-Min (CALTRATE 600+D PLUS MINERALS PO) Take 1 tablet by mouth daily   • Cetirizine HCl (ZYRTEC ALLERGY PO) Take 1 tablet by mouth as needed     • Cranberry 1000 MG CAPS Take by mouth   • cyanocobalamin (VITAMIN B-12) 100 mcg tablet Take by mouth daily   • docusate sodium (COLACE) 100 mg capsule Take 1 capsule (100 mg total) by mouth 2 (two) times a day   • estradiol (ESTRACE) 0.1 mg/g vaginal cream    • Multiple Vitamin (MULTI-VITAMIN DAILY) TABS Take by mouth daily   • Multiple Vitamins-Minerals (HAIR SKIN NAILS PO) Take by mouth   • Trelegy Ellipta 200-62.5-25 MCG/INH AEPB inhaler Inhale 1 puff daily   • VITAMIN D PO Take 1 tablet by mouth every other day   • Zinc Sulfate (ZINC-220 PO) Take 1 tablet by mouth daily     No current facility-administered medications on file prior to visit. She has No Known Allergies. .    Review of Systems  Constitutional: Negative for activity change, appetite change, chills and fever. HENT: Negative for congestion and rhinorrhea. Eyes: Negative for visual disturbance. Respiratory: Negative for chest tightness and shortness of breath. Cardiovascular: Negative for chest pain and palpitations. Gastrointestinal: Negative for abdominal pain, blood in stool, diarrhea, nausea and vomiting. Endocrine: Negative for polydipsia, polyphagia and polyuria. Genitourinary: Negative for dysuria, frequency and urgency. Musculoskeletal: Negative for gait problem. Skin: Negative for color change. Neurological: Negative for dizziness and headaches. Hematological: Does not bruise/bleed easily. Psychiatric/Behavioral: Negative for confusion and sleep disturbance.  The patient is not nervous/anxious. Objective:  /78 (BP Location: Left arm, Patient Position: Sitting, Cuff Size: Standard)   Pulse 75   Temp (!) 97.4 °F (36.3 °C)   Ht 5' 3" (1.6 m)   Wt 54.7 kg (120 lb 8 oz)   LMP  (LMP Unknown)   SpO2 99%   BMI 21.35 kg/m²          Physical Exam  Vitals and nursing note reviewed. Constitutional:       General: She is not in acute distress. Appearance: She is well-developed, well-groomed and underweight. HENT:      Head: Normocephalic and atraumatic. Eyes:      General:         Right eye: No discharge. Left eye: No discharge. Conjunctiva/sclera: Conjunctivae normal.      Pupils: Pupils are equal, round, and reactive to light. Cardiovascular:      Rate and Rhythm: Normal rate and regular rhythm. Heart sounds: Normal heart sounds. No murmur heard. No friction rub. No gallop. Pulmonary:      Effort: No respiratory distress. Breath sounds: Decreased breath sounds present. No wheezing or rales. Abdominal:      General: Bowel sounds are normal. There is no distension. Tenderness: There is no abdominal tenderness. Lymphadenopathy:      Cervical: No cervical adenopathy. Skin:     General: Skin is warm and dry. Neurological:      Mental Status: She is alert and oriented to person, place, and time. Psychiatric:         Mood and Affect: Mood and affect normal.         Speech: Speech normal.         Behavior: Behavior normal. Behavior is cooperative. Cognition and Memory: Cognition and memory normal.         Below is the patient's most recent value for Albumin, ALT, AST, BUN, Calcium, Chloride, Cholesterol, CO2, Creatinine, GFR, Glucose, HDL, Hematocrit, Hemoglobin, Hemoglobin A1C, LDL, Magnesium, Phosphorus, Platelets, Potassium, PSA, Sodium, Triglycerides, and WBC.    Lab Results   Component Value Date    ALT 15 11/16/2023    AST 19 11/16/2023    BUN 15 11/16/2023    CALCIUM 8.6 11/16/2023     11/16/2023    CO2 28 11/16/2023    CREATININE 0.65 11/16/2023    HDL 68 11/16/2023    HCT 40.1 11/16/2023    HGB 13.2 11/16/2023    HGBA1C 5.6 11/15/2021     11/16/2023    K 4.2 11/16/2023    TRIG 76 11/16/2023    WBC 7.95 11/16/2023     Note: for a comprehensive list of the patient's lab results, access the Results Review activity. I have personally reviewed results with the patient. Vitamin D level: normal  CBC: normal  Eosinophils: slight elevation due to allergies. Kidney function test: normal  Blood glucose level: normal  Cholesterol: 178 mg/dL. Triglycerides: 76 mg/dL. HDL: 68 mg/dL. LDL: 95 mg/dL. Transcribed for Bacilio Chavez MD, by Yisel Mccord on 11/22/23 at 7:10 AM. Powered by [employer].

## 2023-11-27 ENCOUNTER — TELEPHONE (OUTPATIENT)
Dept: INTERNAL MEDICINE CLINIC | Facility: CLINIC | Age: 73
End: 2023-11-27

## 2023-11-27 DIAGNOSIS — U07.1 COVID-19: Primary | ICD-10-CM

## 2023-11-27 RX ORDER — NIRMATRELVIR AND RITONAVIR 300-100 MG
3 KIT ORAL 2 TIMES DAILY
Qty: 30 TABLET | Refills: 0 | Status: SHIPPED | OUTPATIENT
Start: 2023-11-27 | End: 2023-12-02

## 2023-11-27 NOTE — TELEPHONE ENCOUNTER
Patient started yesterday with symptoms of covid and tested positive today . Symptoms are sinus issues no other symptoms to date . Patient did want paxlovid sent to [address] [place] [address] Dr Cueto Side effect gone over , patient understood . Provider sent script .

## 2024-02-12 ENCOUNTER — HOSPITAL ENCOUNTER (OUTPATIENT)
Dept: MAMMOGRAPHY | Facility: HOSPITAL | Age: 74
Discharge: HOME | End: 2024-02-12
Payer: MEDICARE

## 2024-02-12 VITALS — BODY MASS INDEX: 21.35 KG/M2 | HEIGHT: 63 IN | WEIGHT: 120.5 LBS

## 2024-02-12 DIAGNOSIS — Z12.31 ENCOUNTER FOR SCREENING MAMMOGRAM FOR BREAST CANCER: ICD-10-CM

## 2024-02-12 DIAGNOSIS — Z01.419 ENCOUNTER FOR ANNUAL ROUTINE GYNECOLOGICAL EXAMINATION: ICD-10-CM

## 2024-02-12 PROCEDURE — 77067 SCR MAMMO BI INCL CAD: CPT

## 2024-02-12 PROCEDURE — 77063 BREAST TOMOSYNTHESIS BI: CPT

## 2024-02-28 ENCOUNTER — APPOINTMENT (OUTPATIENT)
Age: 74
End: 2024-02-28
Payer: MEDICARE

## 2024-02-28 DIAGNOSIS — J44.9 SEVERE CHRONIC OBSTRUCTIVE PULMONARY DISEASE (HCC): ICD-10-CM

## 2024-02-28 DIAGNOSIS — M81.0 AGE-RELATED OSTEOPOROSIS WITHOUT CURRENT PATHOLOGICAL FRACTURE: ICD-10-CM

## 2024-02-28 DIAGNOSIS — E55.9 VITAMIN D DEFICIENCY: ICD-10-CM

## 2024-02-28 DIAGNOSIS — E78.5 HYPERLIPIDEMIA, UNSPECIFIED HYPERLIPIDEMIA TYPE: ICD-10-CM

## 2024-02-28 LAB
25(OH)D3 SERPL-MCNC: 67 NG/ML (ref 30–100)
ALBUMIN SERPL BCP-MCNC: 4.3 G/DL (ref 3.5–5)
ALP SERPL-CCNC: 93 U/L (ref 34–104)
ALT SERPL W P-5'-P-CCNC: 19 U/L (ref 7–52)
ANION GAP SERPL CALCULATED.3IONS-SCNC: 7 MMOL/L
AST SERPL W P-5'-P-CCNC: 19 U/L (ref 13–39)
BASOPHILS # BLD AUTO: 0.08 THOUSANDS/ÂΜL (ref 0–0.1)
BASOPHILS NFR BLD AUTO: 1 % (ref 0–1)
BILIRUB SERPL-MCNC: 0.39 MG/DL (ref 0.2–1)
BUN SERPL-MCNC: 13 MG/DL (ref 5–25)
CALCIUM SERPL-MCNC: 9.3 MG/DL (ref 8.4–10.2)
CHLORIDE SERPL-SCNC: 104 MMOL/L (ref 96–108)
CHOLEST SERPL-MCNC: 179 MG/DL
CO2 SERPL-SCNC: 27 MMOL/L (ref 21–32)
CREAT SERPL-MCNC: 0.72 MG/DL (ref 0.6–1.3)
EOSINOPHIL # BLD AUTO: 0.44 THOUSAND/ÂΜL (ref 0–0.61)
EOSINOPHIL NFR BLD AUTO: 6 % (ref 0–6)
ERYTHROCYTE [DISTWIDTH] IN BLOOD BY AUTOMATED COUNT: 15 % (ref 11.6–15.1)
GFR SERPL CREATININE-BSD FRML MDRD: 82 ML/MIN/1.73SQ M
GLUCOSE P FAST SERPL-MCNC: 88 MG/DL (ref 65–99)
HCT VFR BLD AUTO: 44.8 % (ref 34.8–46.1)
HDLC SERPL-MCNC: 80 MG/DL
HGB BLD-MCNC: 14.1 G/DL (ref 11.5–15.4)
IMM GRANULOCYTES # BLD AUTO: 0.01 THOUSAND/UL (ref 0–0.2)
IMM GRANULOCYTES NFR BLD AUTO: 0 % (ref 0–2)
LDLC SERPL CALC-MCNC: 87 MG/DL (ref 0–100)
LYMPHOCYTES # BLD AUTO: 3 THOUSANDS/ÂΜL (ref 0.6–4.47)
LYMPHOCYTES NFR BLD AUTO: 38 % (ref 14–44)
MCH RBC QN AUTO: 29.6 PG (ref 26.8–34.3)
MCHC RBC AUTO-ENTMCNC: 31.5 G/DL (ref 31.4–37.4)
MCV RBC AUTO: 94 FL (ref 82–98)
MONOCYTES # BLD AUTO: 0.83 THOUSAND/ÂΜL (ref 0.17–1.22)
MONOCYTES NFR BLD AUTO: 10 % (ref 4–12)
NEUTROPHILS # BLD AUTO: 3.61 THOUSANDS/ÂΜL (ref 1.85–7.62)
NEUTS SEG NFR BLD AUTO: 45 % (ref 43–75)
NONHDLC SERPL-MCNC: 99 MG/DL
NRBC BLD AUTO-RTO: 0 /100 WBCS
PLATELET # BLD AUTO: 253 THOUSANDS/UL (ref 149–390)
PMV BLD AUTO: 9.9 FL (ref 8.9–12.7)
POTASSIUM SERPL-SCNC: 4.3 MMOL/L (ref 3.5–5.3)
PROT SERPL-MCNC: 7 G/DL (ref 6.4–8.4)
RBC # BLD AUTO: 4.77 MILLION/UL (ref 3.81–5.12)
SODIUM SERPL-SCNC: 138 MMOL/L (ref 135–147)
TRIGL SERPL-MCNC: 60 MG/DL
TSH SERPL DL<=0.05 MIU/L-ACNC: 2.65 UIU/ML (ref 0.45–4.5)
WBC # BLD AUTO: 7.97 THOUSAND/UL (ref 4.31–10.16)

## 2024-02-28 PROCEDURE — 80061 LIPID PANEL: CPT

## 2024-02-28 PROCEDURE — 82306 VITAMIN D 25 HYDROXY: CPT

## 2024-02-28 PROCEDURE — 84443 ASSAY THYROID STIM HORMONE: CPT

## 2024-02-28 PROCEDURE — 85025 COMPLETE CBC W/AUTO DIFF WBC: CPT

## 2024-02-28 PROCEDURE — 80053 COMPREHEN METABOLIC PANEL: CPT

## 2024-02-28 PROCEDURE — 36415 COLL VENOUS BLD VENIPUNCTURE: CPT

## 2024-03-03 NOTE — PROGRESS NOTES
Assessment/Plan:       1. Severe chronic obstructive pulmonary disease (HCC)  Assessment & Plan:  COPD controlled at present.  Continue with the Trelegy.  Smoking cessation discussed but she does not feel ready at present.  Watch for any worsening of respiratory status.    Orders:  -     CBC and differential; Future    2. Age-related osteoporosis without current pathological fracture  Assessment & Plan:  She is due for a bone density scan. I will order a bone density scan. Depending on the results of the bone density scan, I will decide on Fosamax.  Discussed with her the risk of prolonged use of Fosamax.  Continue with calcium and vitamin D supplementation.    Orders:  -     CBC and differential; Future    3. Hyperlipidemia, unspecified hyperlipidemia type  Assessment & Plan:  Cholesterol currently controlled.  Watch diet.  Continue to remain as active as possible.    Orders:  -     CBC and differential; Future  -     Comprehensive metabolic panel; Future  -     Lipid panel; Future  -     TSH, 3rd generation; Future    4. Vitamin D deficiency  Assessment & Plan:  Continue to follow vitamin D level with routine laboratory testing.  Continue with calcium and vitamin D supplementation.    Orders:  -     CBC and differential; Future  -     Vitamin D 25 hydroxy; Future    5. Postmenopausal  -     DXA bone density spine hip and pelvis; Future; Expected date: 03/04/2024    6. Mild protein-calorie malnutrition (HCC)  Assessment & Plan:  Malnutrition Findings: Slight muscle wasting into the thighs bilaterally                                BMI Findings:           Body mass index is 21.51 kg/m².     Eat regularly.  Increase protein in diet.        7. Bilateral hearing loss, unspecified hearing loss type  -     Ambulatory Referral to Audiology; Future        Hearing loss.  I will refer her to an audiologist.         Depression Screening and Follow-up Plan: Patient was screened for depression during today's encounter. They  screened negative with a PHQ-2 score of 0.    Tobacco Cessation Counseling: Tobacco cessation counseling was provided. The patient is sincerely urged to quit consumption of tobacco. She is not ready to quit tobacco.     Orders and recommendations as noted above.  Will have her follow-up in about 3 to 6 months or sooner if needed.          Subjective:      Patient ID: Elvira Reed is a 73 y.o. female who presents for a routine follow-up.    She reports feeling good. Her breathing has been satisfactory. She denies any side effects from her medications. She has been taking Fosamax for some time and denies any issues or discomfort in the stomach associated with the medication.     She reports experiencing changes in her auditory perception and would like to undergo an evaluation. She acknowledges the presence of conversations occurring behind her but struggles to comprehend the content, often attributing this difficulty to ambient noise. She often pretends that she understands despite not actually hearing. She reports not cleaning her ears, leading her to suspect possible obstruction.    She indicates that her joints are in normal condition.     Her vision is normal. She recently visited her eye doctor, and all the results were normal.     She underwent both a mammogram and blood work, and the results were normal.    She works 4 days a week, with Saturdays to Mondays off. Despite her busy schedule, she reports feeling content with her work. She finds satisfaction in her job, although there are occasional moments of uncertainty. However, she considers the overall experience to be rewarding.    The following portions of the patient's history were reviewed and updated as appropriate: She  has a past medical history of Amaurosis fugax (02/18/2021), Basal cell carcinoma of skin (02/03/2020), Cancer (HCC), Clotting disorder (HCC), COPD (chronic obstructive pulmonary disease) ([facility]), Hypertension (October 2020),  Tracheobronchitis (10/04/2021), Transient ischemic attack (10/29/2020), Vision disturbance (02/09/2021), and Vision loss, bilateral (02/09/2021).  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   • Compression fracture of body of thoracic vertebra ([facility]) 02/18/2021   • Ectopic beat 02/18/2021   • Severe chronic obstructive pulmonary disease (HCC) 01/25/2021   • Hyperglycemia 04/27/2020   • Hyperlipidemia 04/27/2020   • Vitamin D deficiency 04/27/2020   • Basal cell carcinoma of skin 02/03/2020   • Smoker 02/03/2020     She  has a past surgical history that includes Hemorrhoid surgery; pr colpopexy vaginal extraperitoneal approach (N/A, 4/24/2023); pr cmbnd anterpost colporraphy w/cysto (N/A, 4/24/2023); pr sling operation stress incontinence (N/A, 4/24/2023); and pr cystourethroscopy (N/A, 4/24/2023).  Her family history includes Breast cancer in her maternal uncle; Deep vein thrombosis in her son; No Known Problems in her daughter, maternal aunt, maternal aunt, maternal grandfather, maternal grandmother, mother, paternal aunt, paternal aunt, paternal aunt, paternal grandfather, paternal grandmother, and sister; Prostate cancer in her brother; Skin cancer in her brother and brother; Stroke in her father; Testicular cancer in her son.  She  reports that she has been smoking cigarettes. She has a 15 pack-year smoking history. She has never used smokeless tobacco. She reports current alcohol use. She reports that she does not use drugs.  Current Outpatient Medications   Medication Sig Dispense Refill   •  alendronate (Fosamax) 70 mg tablet Take 1 tablet (70 mg total) by mouth every 7 days 12 tablet 3   • Ascorbic Acid (Vitamin C) 125 MG CHEW Chew 1 tablet daily     • aspirin 81 mg chewable tablet Chew 81 mg daily     • B Complex Vitamins (VITAMIN B COMPLEX 100 IJ)      • Calcium Carbonate-Vit D-Min (CALTRATE 600+D PLUS MINERALS PO) Take 1 tablet by mouth daily     • Cetirizine HCl (ZYRTEC ALLERGY PO) Take 1 tablet by mouth as needed       • Cranberry 1000 MG CAPS Take by mouth     • cyanocobalamin (VITAMIN B-12) 100 mcg tablet Take by mouth daily     • Multiple Vitamin (MULTI-VITAMIN DAILY) TABS Take by mouth daily     • Multiple Vitamins-Minerals (HAIR SKIN NAILS PO) Take by mouth     • Trelegy Ellipta 200-62.5-25 MCG/INH AEPB inhaler Inhale 1 puff daily 60 blister 5   • VITAMIN D PO Take 1 tablet by mouth every other day     • Zinc Sulfate (ZINC-220 PO) Take 1 tablet by mouth daily       No current facility-administered medications for this visit.     Current Outpatient Medications on File Prior to Visit   Medication Sig   • alendronate (Fosamax) 70 mg tablet Take 1 tablet (70 mg total) by mouth every 7 days   • Ascorbic Acid (Vitamin C) 125 MG CHEW Chew 1 tablet daily   • aspirin 81 mg chewable tablet Chew 81 mg daily   • B Complex Vitamins (VITAMIN B COMPLEX 100 IJ)    • Calcium Carbonate-Vit D-Min (CALTRATE 600+D PLUS MINERALS PO) Take 1 tablet by mouth daily   • Cetirizine HCl (ZYRTEC ALLERGY PO) Take 1 tablet by mouth as needed     • Cranberry 1000 MG CAPS Take by mouth   • cyanocobalamin (VITAMIN B-12) 100 mcg tablet Take by mouth daily   • Multiple Vitamin (MULTI-VITAMIN DAILY) TABS Take by mouth daily   • Multiple Vitamins-Minerals (HAIR SKIN NAILS PO) Take by mouth   • Trelegy Ellipta 200-62.5-25 MCG/INH AEPB inhaler Inhale 1 puff daily   • VITAMIN D PO Take 1 tablet by mouth every other day   • Zinc Sulfate (ZINC-220 PO) Take 1 tablet by mouth daily     No current facility-administered medications on file  "prior to visit.     She has No Known Allergies..    Review of Systems   Constitutional:  Negative for activity change, appetite change, chills and fever.   HENT:  Positive for hearing loss. Negative for congestion and rhinorrhea.    Eyes:  Negative for visual disturbance.   Respiratory:  Negative for chest tightness, shortness of breath and wheezing.    Cardiovascular:  Negative for chest pain and palpitations.   Gastrointestinal:  Negative for abdominal pain, blood in stool, diarrhea, nausea and vomiting.   Endocrine: Negative for polydipsia, polyphagia and polyuria.   Genitourinary:  Negative for dysuria, frequency and urgency.   Musculoskeletal:  Negative for gait problem.   Skin:  Negative for color change.   Neurological:  Negative for dizziness and headaches.   Hematological:  Does not bruise/bleed easily.   Psychiatric/Behavioral:  Negative for confusion and sleep disturbance. The patient is not nervous/anxious.            Objective:  /76 (BP Location: Left arm, Patient Position: Sitting, Cuff Size: Large)   Pulse 65   Temp 97.6 °F (36.4 °C)   Ht 5' 3\" (1.6 m)   Wt 55.1 kg (121 lb 6.4 oz)   LMP  (LMP Unknown)   SpO2 95%   BMI 21.51 kg/m²          Physical Exam  Vitals and nursing note reviewed.   Constitutional:       General: She is not in acute distress.     Appearance: She is well-developed and well-groomed.   HENT:      Head: Normocephalic and atraumatic.      Ears:      Comments: No visible earwax. Dry skin noted.  Eyes:      General:         Right eye: No discharge.         Left eye: No discharge.      Conjunctiva/sclera: Conjunctivae normal.      Pupils: Pupils are equal, round, and reactive to light.   Cardiovascular:      Rate and Rhythm: Normal rate and regular rhythm.      Heart sounds: Normal heart sounds. No murmur heard.     No friction rub. No gallop.   Pulmonary:      Effort: No respiratory distress.      Breath sounds: Decreased breath sounds present. No wheezing or rales. "   Abdominal:      General: Bowel sounds are normal. There is no distension.      Tenderness: There is no abdominal tenderness.   Lymphadenopathy:      Cervical: No cervical adenopathy.   Skin:     General: Skin is warm and dry.   Neurological:      Mental Status: She is alert and oriented to person, place, and time.   Psychiatric:         Mood and Affect: Mood and affect normal.         Speech: Speech normal.         Behavior: Behavior normal. Behavior is cooperative.         Cognition and Memory: Cognition normal.     Below is the patient's most recent value for Albumin, ALT, AST, BUN, Calcium, Chloride, Cholesterol, CO2, Creatinine, GFR, Glucose, HDL, Hematocrit, Hemoglobin, Hemoglobin A1C, LDL, Magnesium, Phosphorus, Platelets, Potassium, PSA, Sodium, Triglycerides, and WBC.   Lab Results   Component Value Date    ALT 19 02/28/2024    AST 19 02/28/2024    BUN 13 02/28/2024    CALCIUM 9.3 02/28/2024     02/28/2024    CO2 27 02/28/2024    CREATININE 0.72 02/28/2024    HDL 80 02/28/2024    HCT 44.8 02/28/2024    HGB 14.1 02/28/2024    HGBA1C 5.6 11/15/2021     02/28/2024    K 4.3 02/28/2024    TRIG 60 02/28/2024    WBC 7.97 02/28/2024     Note: for a comprehensive list of the patient's lab results, access the Results Review activity.    I have personally reviewed results with the patient.     Her vitamin D level was 67 ng/mL, which was normal. Her blood count, kidney test, blood glucose level, and cholesterol level were normal.        Transcribed for Karla Coates MD, by Mary Ann Ledezma on 03/04/24 at 9:03 PM. Powered by [employer].

## 2024-03-04 ENCOUNTER — OFFICE VISIT (OUTPATIENT)
Dept: INTERNAL MEDICINE CLINIC | Facility: CLINIC | Age: 74
End: 2024-03-04
Payer: MEDICARE

## 2024-03-04 VITALS
DIASTOLIC BLOOD PRESSURE: 76 MMHG | HEIGHT: 63 IN | BODY MASS INDEX: 21.51 KG/M2 | OXYGEN SATURATION: 95 % | HEART RATE: 65 BPM | SYSTOLIC BLOOD PRESSURE: 128 MMHG | TEMPERATURE: 97.6 F | WEIGHT: 121.4 LBS

## 2024-03-04 DIAGNOSIS — M81.0 AGE-RELATED OSTEOPOROSIS WITHOUT CURRENT PATHOLOGICAL FRACTURE: ICD-10-CM

## 2024-03-04 DIAGNOSIS — J44.9 SEVERE CHRONIC OBSTRUCTIVE PULMONARY DISEASE (HCC): Primary | ICD-10-CM

## 2024-03-04 DIAGNOSIS — Z78.0 POSTMENOPAUSAL: ICD-10-CM

## 2024-03-04 DIAGNOSIS — H91.93 BILATERAL HEARING LOSS, UNSPECIFIED HEARING LOSS TYPE: ICD-10-CM

## 2024-03-04 DIAGNOSIS — E78.5 HYPERLIPIDEMIA, UNSPECIFIED HYPERLIPIDEMIA TYPE: ICD-10-CM

## 2024-03-04 DIAGNOSIS — E55.9 VITAMIN D DEFICIENCY: ICD-10-CM

## 2024-03-04 DIAGNOSIS — E44.1 MILD PROTEIN-CALORIE MALNUTRITION (HCC): ICD-10-CM

## 2024-03-04 PROCEDURE — 99214 OFFICE O/P EST MOD 30 MIN: CPT | Performed by: FAMILY MEDICINE

## 2024-03-04 PROCEDURE — G2211 COMPLEX E/M VISIT ADD ON: HCPCS | Performed by: FAMILY MEDICINE

## 2024-03-04 NOTE — ASSESSMENT & PLAN NOTE
She is due for a bone density scan. I will order a bone density scan. Depending on the results of the bone density scan, I will decide on Fosamax.  Discussed with her the risk of prolonged use of Fosamax.  Continue with calcium and vitamin D supplementation.

## 2024-03-05 NOTE — ASSESSMENT & PLAN NOTE
Malnutrition Findings: Slight muscle wasting into the thighs bilaterally                                BMI Findings:           Body mass index is 21.51 kg/m².     Eat regularly.  Increase protein in diet.

## 2024-03-05 NOTE — ASSESSMENT & PLAN NOTE
COPD controlled at present.  Continue with the Trelegy.  Smoking cessation discussed but she does not feel ready at present.  Watch for any worsening of respiratory status.

## 2024-03-05 NOTE — ASSESSMENT & PLAN NOTE
Continue to follow vitamin D level with routine laboratory testing.  Continue with calcium and vitamin D supplementation.

## 2024-03-11 ENCOUNTER — HOSPITAL ENCOUNTER (OUTPATIENT)
Dept: BONE DENSITY | Facility: HOSPITAL | Age: 74
Discharge: HOME | End: 2024-03-11
Payer: MEDICARE

## 2024-03-11 VITALS — BODY MASS INDEX: 20.91 KG/M2 | WEIGHT: 118 LBS | HEIGHT: 63 IN

## 2024-03-11 DIAGNOSIS — Z78.0 POSTMENOPAUSAL: ICD-10-CM

## 2024-03-11 PROCEDURE — 77080 DXA BONE DENSITY AXIAL: CPT

## 2024-03-22 DIAGNOSIS — J44.9 SEVERE CHRONIC OBSTRUCTIVE PULMONARY DISEASE (HCC): ICD-10-CM

## 2024-04-02 ENCOUNTER — OFFICE VISIT (OUTPATIENT)
Dept: AUDIOLOGY | Facility: CLINIC | Age: 74
End: 2024-04-02
Payer: MEDICARE

## 2024-04-02 DIAGNOSIS — H91.93 BILATERAL HEARING LOSS, UNSPECIFIED HEARING LOSS TYPE: ICD-10-CM

## 2024-04-02 DIAGNOSIS — H90.3 SENSORY HEARING LOSS, BILATERAL: Primary | ICD-10-CM

## 2024-04-02 PROCEDURE — 92567 TYMPANOMETRY: CPT | Performed by: AUDIOLOGIST

## 2024-04-02 PROCEDURE — 92557 COMPREHENSIVE HEARING TEST: CPT | Performed by: AUDIOLOGIST

## 2024-04-02 NOTE — PROGRESS NOTES
Diagnostic Hearing Evaluation    Name:  Elvira Reed  :  1950  Age:  73 y.o.   MRN:  [id number]  Date of Evaluation: 24     HISTORY:     Reason for visit: Difficulty Understanding    Elvira Reed is being seen today at the request of Dr. Coates for an initial  evaluation of hearing. Patient reports increased difficulty understanding.  Patient denies otalgia, otorrhea, dizziness, fullness, tinnitus, noise exposure, and notes a positive history of family history of hearing loss.     EVALUATION:    Otoscopic Evaluation:   Right Ear: Unremarkable, canal clear   Left Ear: Unremarkable, canal clear    Tympanometry:   Right Ear: Type Ad; normal middle ear pressure with increased static compliance, consistent with a hypermobile tympanic membrane    Left Ear: Type A; normal middle ear pressure and static compliance     Speech Audiometry:  Speech Reception (SRT)    Right Ear: 45 dB HL    Left Ear: 40 dB HL    Word Recognition Scores (WRS):  Right Ear: excellent (100 % correct)     Left Ear: excellent (90 % correct)    Stimuli: [id number]    Pure Tone Audiometry:  Conventional pure tone audiometry from 250 - 8000 Hz  was obtained with good reliability and revealed the following:     Right Ear: Normal sloping to profound sensorineural hearing loss (SNHL)    Left Ear: Normal sloping to profound sensorineural hearing loss (SNHL)     *see attached audiogram    IMPRESSIONS:   Normal sloping to severe/profound SNHL, bilaterally.    RECOMMENDATIONS:  Annual hearing eval, Return to [facility]. for F/U, Hearing Aid Evaluation, and Copy to Patient/Caregiver    PATIENT EDUCATION:   The results of today's results and recommendations were reviewed with the patient and her hearing thresholds were explained at length. Treatment options, including amplification and communication strategies, were discussed as appropriate. The patient voiced understanding of her test results. Questions were addressed and the patient was encouraged  to contact our department should concerns arise.      Phyllis Mcgrath., CCC-A  Clinical Audiologist  [facility] AUDIOLOGY [place]

## 2024-04-22 ENCOUNTER — OFFICE VISIT (OUTPATIENT)
Dept: AUDIOLOGY | Facility: CLINIC | Age: 74
End: 2024-04-22
Payer: COMMERCIAL

## 2024-04-22 DIAGNOSIS — H90.3 SENSORY HEARING LOSS, BILATERAL: Primary | ICD-10-CM

## 2024-04-22 PROCEDURE — V5261 HEARING AID, DIGIT, BIN, BTE: HCPCS | Performed by: AUDIOLOGIST-HEARING AID FITTER

## 2024-04-22 PROCEDURE — V5160 DISPENSING FEE BINAURAL: HCPCS | Performed by: AUDIOLOGIST-HEARING AID FITTER

## 2024-04-22 NOTE — PROGRESS NOTES
Hearing Aid Evaluation  Name:  Elvira Reed  :  1950  Age:  73 y.o.  MRN:  [id number]  Date of Evaluation: 24     HISTORY:    Elvira Reed was seen today for a hearing aid evaluation following her audiometric testing performed on 24. Elvira was unaccompanied to today's visit. Elvira was referred by Dr. Coates.     RESULTS REVIEW:    The audiometric findings were reviewed with the patient . All of the patient's questions regarding her hearing status were addressed, and the importance of realistic expectations of hearing loss and amplification were discussed.      DEVICE REVIEW & RECOMMENDATION:     Strengths and limitations of amplification, including various hearing aid styles, technology, options, and accessories were discussed at length with patient. Hearing aids are assistive devices and are not designed to restore normal hearing. The patient was counseled on the importance of self-advocacy, motivation, as well as effective communication strategies that can be used to optimize hearing aid success. Based on the degree of her hearing loss, preferences, and lifestyle needs, the following hearing recommendations were made:    The first hearing aid recommendation is Oticon Intent 2.      [facility]'s office policies regarding our hearing aid program were reviewed, including the 45 day trial period, non-refundable return fees, as well as the  warranties and service plan. Hearing aid cost, and payment, as well as insurance benefit (if applicable) were discussed with the patient.     *See attached quote sheet    DEVICE SELECTION:    At this time, patient wishes to proceed with the purchase of the below listed hearing aid(s).     The patient selected the Oticon Intent 2 hearing aids.    Level: Advanced   Color: chroma beige     size: Right 2-85 / Left 2-85   Dome size: 8mm DB   Accessories:        Devices ordered as specified above (order # [id number] ).    Leigh  Nataliya Deshpande, CCC-A  Clinical Audiologist  [facility] AUDIOLOGY [address] [address]

## 2024-05-03 ENCOUNTER — OFFICE VISIT (OUTPATIENT)
Dept: AUDIOLOGY | Facility: CLINIC | Age: 74
End: 2024-05-03

## 2024-05-03 DIAGNOSIS — H90.3 SENSORY HEARING LOSS, BILATERAL: Primary | ICD-10-CM

## 2024-05-03 NOTE — PROGRESS NOTES
Hearing Aid Fitting    Name:  Elvira Reed  :  1950  Age:  73 y.o.  MRN:  [id number]  Date of Evaluation: 24     HISTORY:    Elvira Reed was seen today for a binaural hearing aid fitting of her Oticon Intent 2  in the canal (ANGELICA) hearing aid(s). Elvira was unaccompanied to today's visit. Hearing aid purchase is being paid by private Pay.    DEVICE INFORMATION:     Left Device Right Device   Hearing Aid Make: Oticon  Oticon    Hearing Aid Model: Intent 2 Intent 2   Serial Number: [id number]   Repair Warranty Date: 27   Loss/Damage Warranty Status: Active  Active        Length/Output 2-85 2-85   Wax System: Pro Wax miniFIT Pro Wax miniFIT   Dome Size/Style: 8mm DB 8mm DB   Battery: Lithium-ion Rechargeable Lithium-ion Rechargeable      Earmold Serial Number: N/A N/A   Earmold Warranty Date:  N/A N/A    Serial Number: N/A   Warranty Date: N/A    Accessories: N/A       DEVICE SETTINGS:    Hearing aid(s) were programmed using Oticon fitting formula and were adjusted based on the patient's perceived comfort level. Hearing aid(s) was set to experience level    per patient's subjective listening preference. The patient noted good sound quality, and was happy with the overall sound quality and fit of the hearing aid(s).    DEVICE ORIENTATION:    The patient was counseled on device components and component function. Proper insertion and removal of the aid(s) was demonstrated. The patient practiced insertion and removal of the devices in the office, they demonstrated excellent ability to manipulate the hearing aids. The patient  was given the devices users manual that reviews aid usage and operation, hearing aid cleaning tools, and hearing aid carrying case.     The hearing aid warranty, including unlimited repair and a one time loss and damage per hearing aid, through the , as well as [facility]'s hearing aid service plan, including unlimited office  visits, and supplies were outlined thoroughly. The patient agreed to the terms of sale listed on the purchase agreement containing device specifications, warranties, pricing information, as well as [facility] 45-day trial period timeline. After this period has elapsed, hearing aids cannot be returned.            DEVICE EXPECTATIONS & USAGE:     Hearing aids are assistive devices and are not designed to restore normal hearing sensitivity. The importance of realistic expectations, especially in the presence of background noise, was emphasized. The need for daily, consistent usage (8-12 hours per day) for proper device adjustment, as well as the importance of self-advocacy and practicing effective communication strategies was outlined.     Effective communication strategies include:  1.) Maintaining face-to-face communication, allowing for speechreading of facial expressions, lips, and gestures.  2.) Reducing background noise and distance between communication partners.  3.) Having communication partners reduce their rate of speech when appropriate.  4.) Beginning conversation by getting communication partner's attention.  5.) Asking for rephrasing of missed aspects of conversation rather than asking for repetition.    RECOMMENDATIONS:  The patient demonstrated understanding of all the topics discussed. The patient is to follow-up in 2-3 weeks for a hearing aid check within the trial period as scheduled.     Nataliya Canseco, CCC-A  Clinical Audiologist   [facility] AUDIOLOGY [address] [address]

## 2024-05-17 ENCOUNTER — OFFICE VISIT (OUTPATIENT)
Dept: AUDIOLOGY | Facility: CLINIC | Age: 74
End: 2024-05-17

## 2024-05-17 DIAGNOSIS — H90.3 SENSORY HEARING LOSS, BILATERAL: Primary | ICD-10-CM

## 2024-05-17 NOTE — PROGRESS NOTES
Hearing Aid Visit:    Name:  Elvira Reed  :  1950  Age:  73 y.o.  MRN:  [id number]  Date of Evaluation: 24     HISTORY:    Elvira Reed was seen today (2024) for a(n) in-warranty hearing aid check of her bilateral hearing aids. Today, Elvira reported she is doing very well.    DEVICE INFORMATION:      Left Device Right Device    Hearing Aid Make: Oticon  Oticon    Hearing Aid Model: Intent 2 Intent 2   Serial Number: [id number]   Repair Warranty Date: 27   Loss/Damage Warranty Status: Active  Active         Length/Output 2-85 2-85   Wax System: Pro Wax miniFIT Pro Wax miniFIT   Dome Size/Style: 8mm DB 8mm DB   Battery: Lithium-ion Rechargeable Lithium-ion Rechargeable       Earmold Serial Number: N/A N/A   Earmold Warranty Date:  N/A N/A    Serial Number: N/A   Warranty Date: N/A    Accessories: N/A       ACTION/ADJUSTMENTS:    Activated automatic adaption manager. Reviewed wax guards and domes.     RECOMMENDATIONS:     Return Nataliya Lane, CCC-A  Clinical Audiologist  [facility] AUDIOLOGY [address] [address]

## 2024-06-11 ENCOUNTER — OFFICE VISIT (OUTPATIENT)
Dept: URGENT CARE | Facility: CLINIC | Age: 74
End: 2024-06-11
Payer: MEDICARE

## 2024-06-11 ENCOUNTER — APPOINTMENT (OUTPATIENT)
Dept: RADIOLOGY | Facility: CLINIC | Age: 74
End: 2024-06-11
Payer: MEDICARE

## 2024-06-11 VITALS
HEIGHT: 63 IN | OXYGEN SATURATION: 96 % | RESPIRATION RATE: 20 BRPM | WEIGHT: 120.8 LBS | SYSTOLIC BLOOD PRESSURE: 140 MMHG | DIASTOLIC BLOOD PRESSURE: 80 MMHG | TEMPERATURE: 98 F | HEART RATE: 64 BPM | BODY MASS INDEX: 21.4 KG/M2

## 2024-06-11 DIAGNOSIS — M54.9 MUSCULOSKELETAL BACK PAIN: Primary | ICD-10-CM

## 2024-06-11 DIAGNOSIS — M54.6 ACUTE BILATERAL THORACIC BACK PAIN: ICD-10-CM

## 2024-06-11 DIAGNOSIS — Z72.0 TOBACCO USE: ICD-10-CM

## 2024-06-11 PROCEDURE — 72070 X-RAY EXAM THORAC SPINE 2VWS: CPT

## 2024-06-11 PROCEDURE — 99213 OFFICE O/P EST LOW 20 MIN: CPT | Performed by: NURSE PRACTITIONER

## 2024-06-11 PROCEDURE — 71046 X-RAY EXAM CHEST 2 VIEWS: CPT

## 2024-06-11 PROCEDURE — G0463 HOSPITAL OUTPT CLINIC VISIT: HCPCS | Performed by: NURSE PRACTITIONER

## 2024-06-11 RX ORDER — LIDOCAINE 50 MG/G
1 PATCH TOPICAL DAILY
Qty: 15 PATCH | Refills: 0 | Status: SHIPPED | OUTPATIENT
Start: 2024-06-11

## 2024-06-11 NOTE — PROGRESS NOTES
[facility]        NAME: Elvira Reed is a 73 y.o. female  : 1950    MRN: [id number]  DATE: 2024  TIME: 11:40 AM    Assessment and Plan   Musculoskeletal back pain [M54.9]  1. Musculoskeletal back pain  lidocaine (Lidoderm) 5 %    Diclofenac Sodium (VOLTAREN) 1 %      2. Acute bilateral thoracic back pain  XR chest pa & lateral    XR spine thoracic 2 vw    lidocaine (Lidoderm) 5 %    Diclofenac Sodium (VOLTAREN) 1 %      3. Tobacco use              Patient Instructions       Follow up with PCP in 3-5 days.  Proceed to  ER if symptoms worsen.    If tests have been performed at [facility], our office will contact you with results if changes need to be made to the care plan discussed with you at the visit.  You can review your full results on [facility].      Your xray was preliminarily read by your provider.  A radiologist will read the xray and you will be notified if it is abnormal.    You are to Rest, Ice, compression (ace wrap, splint) elevate  Do not remove the splint until you are instructed to do so.   Take tylenol or motrin as needed.    You are to use your fluticaconse inhaler 1 puff daily.  Use the albuterol rescue inhaler only as needed.   You are to take tylenol or aleve for pain. Monitor how much you take as the aleve can affect your renal function.    Apply the lidoderm patches as prescribed.  You may apply 2 patches daily 12 hours on and 12 hours off.  Use the voltaren gel but do NOT apply patches over the gel.    Follow up with your PCP in 3-5 days  Go to the ED if symptoms worsen     If tests have been performed at [facility], our office will contact you with results if changes need to be made to the care plan discussed with you at the visit.  You can review your full results on [facility].      Chief Complaint     Chief Complaint   Patient presents with    Back Pain     Pain across upper back started this am  ,took aleve with slight relief. Down to 6 from  "10         History of Present Illness       This is a 73 year old female who states went to bed well last night and woke up this am with mid upper thoracic back pain.  She states that movement seems to make it worse but with sitting has a dull ache. States she took 2 advil this am with some relief. She denies any type of injury, fall. She states that she takes fosamax but doesn't know if she has osteoporosis, penia.  She states she does smoke.  She admits that yesterday she noted that she didn't seem to be breathing right and that she used an inhaler that she has.  She denies it was her rescue inhaler. She does not know the name of it.   Pt does have hx of COPD. PMH is listed.     According to EMR pt has hx of T spine compression fractures and age related osteoporosis.     Back Pain        Review of Systems   Review of Systems   Constitutional: Negative.    HENT: Negative.     Eyes: Negative.    Respiratory:          \"Breathing not right\".    Cardiovascular: Negative.    Gastrointestinal: Negative.    Endocrine: Negative.    Genitourinary: Negative.    Musculoskeletal:  Positive for back pain.   Skin: Negative.    Allergic/Immunologic: Negative.    Neurological: Negative.    Hematological: Negative.    Psychiatric/Behavioral: Negative.           Current Medications       Current Outpatient Medications:     Diclofenac Sodium (VOLTAREN) 1 %, Apply 2 g topically 4 (four) times a day, Disp: 100 g, Rfl: 0    fluticasone-umeclidinium-vilanterol (Trelegy Ellipta) 200-62.5-25 mcg/actuation AEPB inhaler, Inhale 1 puff daily, Disp: 60 blister, Rfl: 5    lidocaine (Lidoderm) 5 %, Apply 1 patch topically over 12 hours daily Remove & Discard patch within 12 hours or as directed by MD, Disp: 15 patch, Rfl: 0    alendronate (Fosamax) 70 mg tablet, Take 1 tablet (70 mg total) by mouth every 7 days, Disp: 12 tablet, Rfl: 3    Ascorbic Acid (Vitamin C) 125 MG CHEW, Chew 1 tablet daily, Disp: , Rfl:     aspirin 81 mg chewable tablet, " Chew 81 mg daily, Disp: , Rfl:     B Complex Vitamins (VITAMIN B COMPLEX 100 IJ), , Disp: , Rfl:     Calcium Carbonate-Vit D-Min (CALTRATE 600+D PLUS MINERALS PO), Take 1 tablet by mouth daily, Disp: , Rfl:     Cetirizine HCl (ZYRTEC ALLERGY PO), Take 1 tablet by mouth as needed  , Disp: , Rfl:     Cranberry 1000 MG CAPS, Take by mouth, Disp: , Rfl:     cyanocobalamin (VITAMIN B-12) 100 mcg tablet, Take by mouth daily, Disp: , Rfl:     Multiple Vitamin (MULTI-VITAMIN DAILY) TABS, Take by mouth daily, Disp: , Rfl:     Multiple Vitamins-Minerals (HAIR SKIN NAILS PO), Take by mouth, Disp: , Rfl:     VITAMIN D PO, Take 1 tablet by mouth every other day, Disp: , Rfl:     Zinc Sulfate (ZINC-220 PO), Take 1 tablet by mouth daily, Disp: , Rfl:     Current Allergies     Allergies as of 06/11/2024    (No Known Allergies)            The following portions of the patient's history were reviewed and updated as appropriate: allergies, current medications, past family history, past medical history, past social history, past surgical history and problem list.     Past Medical History:   Diagnosis Date    Amaurosis fugax 02/18/2021    Basal cell carcinoma of skin 02/03/2020    Cancer (HCC)     skin    Clotting disorder (HCC)     + cardiolipins antibodies    COPD (chronic obstructive pulmonary disease) (HCC)     Hypertension October 2020    low 85 & qrhs061/145    Tracheobronchitis 10/04/2021    Transient ischemic attack 10/29/2020    Vision disturbance 02/09/2021    Vision loss, bilateral 02/09/2021       Past Surgical History:   Procedure Laterality Date    HEMORRHOID SURGERY      AK CMBND ANTERPOST COLPORRAPHY W/CYSTO N/A 4/24/2023    Procedure: A&P COLPORRHAPHY;  Surgeon: Daryn Mccall MD;  Location: [facility];  Service: UroGynecology           AK COLPOPEXY VAGINAL EXTRAPERITONEAL APPROACH N/A 4/24/2023    Procedure: COLPOSUSPENSION VAGINAL EXTRAPERITONEAL(ENPLACE);  Surgeon: Daryn Mccall MD;  [facility];   "Service: UroGynecology           NH CYSTOURETHROSCOPY N/A 4/24/2023    Procedure: CYSTO;  Surgeon: Daryn Mccall MD;  [facility];  Service: UroGynecology           NH SLING OPERATION STRESS INCONTINENCE N/A 4/24/2023    Procedure: PV SLING (RITESH);  Surgeon: Daryn Mccall MD;  Location: [facility];  Service: UroGynecology              Family History   Problem Relation Age of Onset    No Known Problems Mother     Stroke Father     No Known Problems Sister     No Known Problems Daughter     No Known Problems Maternal Grandmother     No Known Problems Maternal Grandfather     No Known Problems Paternal Grandmother     No Known Problems Paternal Grandfather     Skin cancer Brother     Prostate cancer Brother     Skin cancer Brother     Deep vein thrombosis Son     Testicular cancer Son     No Known Problems Maternal Aunt     No Known Problems Maternal Aunt     Breast cancer Maternal Uncle         unknown age    No Known Problems Paternal Aunt     No Known Problems Paternal Aunt     No Known Problems Paternal Aunt     Cancer Neg Hx     BRCA2 Positive Neg Hx     BRCA2 Negative Neg Hx     BRCA1 Positive Neg Hx     BRCA 1/2 Neg Hx     BRCA1 Negative Neg Hx     Ovarian cancer Neg Hx     Endometrial cancer Neg Hx     Colon cancer Neg Hx     Breast cancer additional onset Neg Hx          Medications have been verified.        Objective   /80   Pulse 64   Temp 98 °F (36.7 °C)   Resp 20   Ht 5' 3\" (1.6 m)   Wt 54.8 kg (120 lb 12.8 oz)   LMP  (LMP Unknown)   SpO2 96%   BMI 21.40 kg/m²   No LMP recorded (lmp unknown). Patient is postmenopausal.       Physical Exam     Physical Exam  Vitals and nursing note reviewed.   Constitutional:       General: She is not in acute distress.     Appearance: Normal appearance. She is normal weight. She is not toxic-appearing or diaphoretic.   HENT:      Head: Normocephalic and atraumatic.   Eyes:      Extraocular Movements: Extraocular movements intact. "   Cardiovascular:      Rate and Rhythm: Normal rate and regular rhythm.      Pulses: Normal pulses.      Heart sounds: Normal heart sounds. No murmur heard.  Pulmonary:      Effort: Pulmonary effort is normal.      Breath sounds: Examination of the left-upper field reveals wheezing. Wheezing present. No decreased breath sounds, rhonchi or rales.   Musculoskeletal:      Cervical back: Normal range of motion.      Comments: No T spine tenderness with palpation. There is TTP at b/l paraspinal muscles.  No Edema, bruising, redness.  FROM.     Skin:     General: Skin is warm and dry.      Capillary Refill: Capillary refill takes less than 2 seconds.          Neurological:      General: No focal deficit present.      Mental Status: She is alert and oriented to person, place, and time.   Psychiatric:         Mood and Affect: Mood normal.         Behavior: Behavior normal.         Thought Content: Thought content normal.         Judgment: Judgment normal.         Preliminary reading CXR   No acute process seen  Waiting on rad read    Preliminary reading T spine  Older T spine compression fracture t-4/5  No acute process seen  Waiting on rad read

## 2024-06-11 NOTE — LETTER
[facility]  [address] [address]      June 14, 2024    MRN: [id number]     Phone: [phone]     Dear Ms. Reed,    You recently had a(n) Diagnostic Imaging performed on 6/11/2024 at  [facility] that was requested by SAROJ Hernadez. The study was reviewed by a radiologist, which is a physician who specializes in medical imaging. The radiologist issued a report describing his or her findings. In that report there was a finding that the radiologist felt warranted further discussion with your health care provider and that discussion would be beneficial to you.      The results were sent to SAROJ Hernadez on 06/11/2024  4:04 PM. We recommend that you contact SAROJ Hernadez at [phone] or set up an appointment to discuss the results of the imaging test. If you have already heard from SAROJ Hernadez regarding the results of your study, you can disregard this letter.     This letter is not meant to alarm you, but intended to encourage you to follow-up on your results with the provider that sent you for the imaging study. In addition, we have enclosed answers to frequently asked questions by other patients who have also received a letter to review results with their health care provider (see page two).      Thank you for choosing [facility] for your medical imaging needs.                                                                                                                                                        FREQUENTLY ASKED QUESTIONS    Why am I receiving this letter?  [place] State Law requires us to notify patients who have findings on imaging exams that may require more testing or follow-up with a health professional within the next 3 months.        How serious is the finding on the imaging test?  This letter is sent to all patients who may need  follow-up or more testing within the next 3 months.  Receiving this letter does not necessarily mean you have a life-threatening imaging finding or disease.  Recommendations in the radiologist’s imaging report are general in nature and it is up to your healthcare provider to say whether those recommendations make sense for your situation.  You are strongly encouraged to talk to your health care provider about the results and ask whether additional steps need to be taken.    Where can I get a copy of the final report for my recent radiology exam?  To get a full copy of the report you can access your records online at [email or web address] or please contact [facility]’s Medical Records Department at [phone] Monday through Friday between 8 am and 6 pm.         What do I need to do now?           Please contact your health care provider who requested the imaging study to discuss what further actions (if any) are needed.  You may have already heard from (your ordering provider) in regard to this test in which case you can disregard this letter.        NOTICE IN ACCORDANCE WITH THE [place] STATE “PATIENT TEST RESULT INFORMATION ACT OF 2018”    You are receiving this notice as a result of a determination by your diagnostic imaging service that further discussions of your test results are warranted and would be beneficial to you.    The complete results of your test or tests have been or will be sent to the health care practitioner that ordered the test or tests. It is recommended that you contact your health care practitioner to discuss your results as soon as possible.                      1 Principal Discharge DX:	Chest pain

## 2024-06-11 NOTE — RESULT ENCOUNTER NOTE
IMPRESSION:  T spine    1. New mild superior endplate deformity at T7. This is of indeterminate age. This could be further evaluated with MRI.  2. Stable compression deformity at T4.    According to TIERRA carver pt has seen abnormal xray results.   Called and pt discussed with her. She states she will call PCP first thing in AM    Please have your office call patient for immediate follow up

## 2024-06-11 NOTE — PATIENT INSTRUCTIONS
Your xray was preliminarily read by your provider.  A radiologist will read the xray and you will be notified if it is abnormal.    You are to Rest, Ice, compression (ace wrap, splint) elevate  Do not remove the splint until you are instructed to do so.   Take tylenol or motrin as needed.    You are to use your fluticaconse inhaler 1 puff daily.  Use the albuterol rescue inhaler only as needed.   You are to take tylenol or aleve for pain. Monitor how much you take as the aleve can affect your renal function.    Apply the lidoderm patches as prescribed.  You may apply 2 patches daily 12 hours on and 12 hours off.  Use the voltaren gel but do NOT apply patches over the gel.    Follow up with your PCP in 3-5 days  Go to the ED if symptoms worsen     If tests have been performed at [facility], our office will contact you with results if changes need to be made to the care plan discussed with you at the visit.  You can review your full results on [facility] MyChart.

## 2024-06-12 ENCOUNTER — TELEPHONE (OUTPATIENT)
Dept: URGENT CARE | Facility: CLINIC | Age: 74
End: 2024-06-12

## 2024-06-12 NOTE — TELEPHONE ENCOUNTER
"Contacted patient for follow up. Pt verbalized understanding of radiology report and need for PCP follow up. Pt states \"I actually had one for today, I went to the office and even received the e-check in text on my phone, but the girls at the desk said I wasn't on the schedule today\". Pt confirms follow up appointment was not provided, verbalizes she will \"keep trying\".  Provider aware of same. No further questions at this time.  "

## 2024-06-14 ENCOUNTER — TELEPHONE (OUTPATIENT)
Age: 74
End: 2024-06-14

## 2024-06-14 ENCOUNTER — OFFICE VISIT (OUTPATIENT)
Dept: INTERNAL MEDICINE CLINIC | Facility: CLINIC | Age: 74
End: 2024-06-14
Payer: MEDICARE

## 2024-06-14 VITALS
TEMPERATURE: 97.6 F | BODY MASS INDEX: 21.6 KG/M2 | HEART RATE: 62 BPM | WEIGHT: 121.9 LBS | SYSTOLIC BLOOD PRESSURE: 140 MMHG | HEIGHT: 63 IN | OXYGEN SATURATION: 96 % | DIASTOLIC BLOOD PRESSURE: 80 MMHG

## 2024-06-14 DIAGNOSIS — M43.9 COMPRESSION DEFORMITY OF VERTEBRA: Primary | ICD-10-CM

## 2024-06-14 DIAGNOSIS — M81.0 AGE-RELATED OSTEOPOROSIS WITHOUT CURRENT PATHOLOGICAL FRACTURE: ICD-10-CM

## 2024-06-14 DIAGNOSIS — M43.9 COMPRESSION DEFORMITY OF VERTEBRA: ICD-10-CM

## 2024-06-14 PROCEDURE — G2211 COMPLEX E/M VISIT ADD ON: HCPCS | Performed by: NURSE PRACTITIONER

## 2024-06-14 PROCEDURE — 99214 OFFICE O/P EST MOD 30 MIN: CPT | Performed by: NURSE PRACTITIONER

## 2024-06-14 RX ORDER — TRAMADOL HYDROCHLORIDE 50 MG/1
50 TABLET ORAL 2 TIMES DAILY
Qty: 10 TABLET | Refills: 1 | Status: SHIPPED | OUTPATIENT
Start: 2024-06-14

## 2024-06-14 RX ORDER — TRAMADOL HYDROCHLORIDE 50 MG/1
50 TABLET ORAL 2 TIMES DAILY
Qty: 10 TABLET | Refills: 1 | Status: SHIPPED | OUTPATIENT
Start: 2024-06-14 | End: 2024-06-14 | Stop reason: SDUPTHER

## 2024-06-14 NOTE — PROGRESS NOTES
"Ambulatory Visit  Name: Elvira Reed      : 1950      MRN: [id number]  Encounter Provider: SAROJ Finch  Encounter Date: 2024   Encounter department: [facility]    Assessment & Plan   1. Compression deformity of vertebra  -     traMADol (ULTRAM) 50 mg tablet; Take 1 tablet (50 mg total) by mouth 2 (two) times a day for 10 days As  needed  2. Age-related osteoporosis without current pathological fracture  -     traMADol (ULTRAM) 50 mg tablet; Take 1 tablet (50 mg total) by mouth 2 (two) times a day for 10 days As  needed     Will give a short course of Tramadol while on vacation. Will decide once she is hope about pain management or an MRI. Will follow up then.    History of Present Illness     Elvira is is for an  follow up. She was at the  for severe back pain with XR showing deformity at T7 and T4. She states the pain is getting better and is going on vacation for a week to the beach. She states she is taking Alevel and Tylenol which does help. She would like to wait to come back from vacation regarding an MRI or pain management. She offers no other issues.        Review of Systems   Musculoskeletal:  Positive for back pain.   All other systems reviewed and are negative.      Objective     /80   Pulse 62   Temp 97.6 °F (36.4 °C) (Temporal)   Ht 5' 3\" (1.6 m)   Wt 55.3 kg (121 lb 14.4 oz)   LMP  (LMP Unknown)   SpO2 96%   BMI 21.59 kg/m²     Physical Exam  Vitals reviewed.   Constitutional:       Appearance: Normal appearance. She is normal weight.   Cardiovascular:      Rate and Rhythm: Normal rate and regular rhythm.      Pulses: Normal pulses.      Heart sounds: Normal heart sounds.   Pulmonary:      Effort: Pulmonary effort is normal.      Breath sounds: Normal breath sounds.   Musculoskeletal:         General: Normal range of motion.   Skin:     General: Skin is warm and dry.      Capillary Refill: Capillary refill takes less than 2 " seconds.   Neurological:      General: No focal deficit present.      Mental Status: She is alert and oriented to person, place, and time. Mental status is at baseline.   Psychiatric:         Mood and Affect: Mood normal.         Behavior: Behavior normal.         Thought Content: Thought content normal.         Judgment: Judgment normal.       Administrative Statements

## 2024-06-14 NOTE — TELEPHONE ENCOUNTER
Rosa Maria from [facility] called stating the prescription sent in this morning for traMADol (ULTRAM) 50 mg tablet instructions are to take 1 tablet by mouth 2 times a day for 10 days, however, only 10 tablets were listed to be dispensed.  Please send corrected script to pharmacy.

## 2024-06-24 ENCOUNTER — TELEPHONE (OUTPATIENT)
Age: 74
End: 2024-06-24

## 2024-06-24 NOTE — TELEPHONE ENCOUNTER
Pt called back, said she has no metal in her body, said only thing she would have is possibly metal fillings in her mouth?

## 2024-06-25 NOTE — TELEPHONE ENCOUNTER
Patient called, states she wanted to see about the MRI being ordered. Patient states the only metal she has in her body are her metal filling, no replacements. States she would like a call back as soon as the order is placed in the system.

## 2024-06-26 DIAGNOSIS — M43.9 COMPRESSION DEFORMITY OF VERTEBRA: Primary | ICD-10-CM

## 2024-07-02 ENCOUNTER — HOSPITAL ENCOUNTER (OUTPATIENT)
Dept: MRI IMAGING | Facility: HOSPITAL | Age: 74
Discharge: HOME | End: 2024-07-02
Payer: MEDICARE

## 2024-07-02 DIAGNOSIS — M43.9 COMPRESSION DEFORMITY OF VERTEBRA: ICD-10-CM

## 2024-07-02 PROCEDURE — 72146 MRI CHEST SPINE W/O DYE: CPT

## 2024-07-03 ENCOUNTER — TELEPHONE (OUTPATIENT)
Dept: INTERNAL MEDICINE CLINIC | Facility: CLINIC | Age: 74
End: 2024-07-03

## 2024-07-03 NOTE — TELEPHONE ENCOUNTER
As per Angelina I called patient and informed her of her MRI results and asked her if she would want to see Pain Management or Spine Specialist.  She does not know which one to see or if she need PT.  Patient is requesting Angelina to confer with Dr. Coates.

## 2024-07-10 DIAGNOSIS — M43.9 COMPRESSION DEFORMITY OF VERTEBRA: ICD-10-CM

## 2024-07-10 DIAGNOSIS — S22.000A COMPRESSION FRACTURE OF BODY OF THORACIC VERTEBRA (HCC): Primary | ICD-10-CM

## 2024-07-17 ENCOUNTER — RA CDI HCC (OUTPATIENT)
Dept: OTHER | Facility: HOSPITAL | Age: 74
End: 2024-07-17

## 2024-07-24 ENCOUNTER — OFFICE VISIT (OUTPATIENT)
Dept: OBGYN CLINIC | Facility: CLINIC | Age: 74
End: 2024-07-24
Payer: MEDICARE

## 2024-07-24 ENCOUNTER — APPOINTMENT (OUTPATIENT)
Dept: RADIOLOGY | Facility: CLINIC | Age: 74
End: 2024-07-24
Payer: MEDICARE

## 2024-07-24 VITALS
HEIGHT: 63 IN | BODY MASS INDEX: 21.35 KG/M2 | HEART RATE: 60 BPM | SYSTOLIC BLOOD PRESSURE: 169 MMHG | DIASTOLIC BLOOD PRESSURE: 80 MMHG | WEIGHT: 120.5 LBS

## 2024-07-24 DIAGNOSIS — S22.060A CLOSED WEDGE COMPRESSION FRACTURE OF T7 VERTEBRA, INITIAL ENCOUNTER (HCC): ICD-10-CM

## 2024-07-24 DIAGNOSIS — M80.00XA OSTEOPOROSIS WITH CURRENT PATHOLOGICAL FRACTURE, UNSPECIFIED OSTEOPOROSIS TYPE, INITIAL ENCOUNTER: ICD-10-CM

## 2024-07-24 DIAGNOSIS — M54.9 MID BACK PAIN: ICD-10-CM

## 2024-07-24 DIAGNOSIS — M54.9 MID BACK PAIN: Primary | ICD-10-CM

## 2024-07-24 PROCEDURE — 99203 OFFICE O/P NEW LOW 30 MIN: CPT | Performed by: ORTHOPAEDIC SURGERY

## 2024-07-24 PROCEDURE — 72072 X-RAY EXAM THORAC SPINE 3VWS: CPT

## 2024-07-24 NOTE — PROGRESS NOTES
"[facility] ORTHOPEDIC SPINE SURGERY  DR.AMIR CHARLOTTE MD  [address] [address] [phone]    HISTORY OF PRESENT ILLNESS:    Elvira Reed is a 73 y.o. female who presents for initial evaluation of  T7 compression fracture. Patient reports her pain in the back started on June 11th without known injury. Patient went to Urgent Care on that date where xrays of her thoracic spine were done. Patient followed up with her PCP who ordered an MRI of her thoracic spine and referred her to Spine & Pain. Patient was instructed to follow-up with orthopedics at that time. Patient states initially pain was in the middle of her back going around her sides and into her abdomen. Patient has improved somewhat since that time and is now in the middle of her back. Patient states pain worsens with sitting for prolonged periods. Pain is not constant in nature.       Diabetic: No  Nicotine use: Yes   BMI: Estimated body mass index is 21.35 kg/m² as calculated from the following:    Height as of this encounter: 5' 3\" (1.6 m).    Weight as of this encounter: 54.7 kg (120 lb 8 oz).  Blood thinners: Aspirin    ALLERGIES: No Known Allergies    MEDICATIONS:    Current Outpatient Medications:     alendronate (Fosamax) 70 mg tablet, Take 1 tablet (70 mg total) by mouth every 7 days, Disp: 12 tablet, Rfl: 3    Ascorbic Acid (Vitamin C) 125 MG CHEW, Chew 1 tablet daily, Disp: , Rfl:     aspirin 81 mg chewable tablet, Chew 81 mg daily, Disp: , Rfl:     B Complex Vitamins (VITAMIN B COMPLEX 100 IJ), , Disp: , Rfl:     Calcium Carbonate-Vit D-Min (CALTRATE 600+D PLUS MINERALS PO), Take 1 tablet by mouth daily, Disp: , Rfl:     Cetirizine HCl (ZYRTEC ALLERGY PO), Take 1 tablet by mouth as needed  , Disp: , Rfl:     Cranberry 1000 MG CAPS, Take by mouth, Disp: , Rfl:     cyanocobalamin (VITAMIN B-12) 100 mcg tablet, Take by mouth daily, Disp: , Rfl:     fluticasone-umeclidinium-vilanterol (Trelegy Ellipta) 200-62.5-25 " mcg/actuation AEPB inhaler, Inhale 1 puff daily, Disp: 60 blister, Rfl: 5    Multiple Vitamin (MULTI-VITAMIN DAILY) TABS, Take by mouth daily, Disp: , Rfl:     Multiple Vitamins-Minerals (HAIR SKIN NAILS PO), Take by mouth, Disp: , Rfl:     VITAMIN D PO, Take 1 tablet by mouth every other day, Disp: , Rfl:     Zinc Sulfate (ZINC-220 PO), Take 1 tablet by mouth daily, Disp: , Rfl:     Diclofenac Sodium (VOLTAREN) 1 %, Apply 2 g topically 4 (four) times a day, Disp: 100 g, Rfl: 0    lidocaine (Lidoderm) 5 %, Apply 1 patch topically over 12 hours daily Remove & Discard patch within 12 hours or as directed by MD, Disp: 15 patch, Rfl: 0    traMADol (ULTRAM) 50 mg tablet, Take 1 tablet (50 mg total) by mouth 2 (two) times a day As  needed, Disp: 10 tablet, Rfl: 1     PAST MEDICAL HISTORY:   Past Medical History:   Diagnosis Date    Amaurosis fugax 02/18/2021    Basal cell carcinoma of skin 02/03/2020    Cancer (HCC)     skin    Clotting disorder (HCC)     + cardiolipins antibodies    COPD (chronic obstructive pulmonary disease) (HCC)     Hypertension October 2020    low 85 & [id number]    Tracheobronchitis 10/04/2021    Transient ischemic attack 10/29/2020    Vision disturbance 02/09/2021    Vision loss, bilateral 02/09/2021       PAST SURGICAL HISTORY:  Past Surgical History:   Procedure Laterality Date    HEMORRHOID SURGERY      MT CMBND ANTERPOST COLPORRAPHY W/CYSTO N/A 4/24/2023    Procedure: A&P COLPORRHAPHY;  Surgeon: Daryn Mccall MD;  Location: [facility];  Service: UroGynecology           MT COLPOPEXY VAGINAL EXTRAPERITONEAL APPROACH N/A 4/24/2023    Procedure: COLPOSUSPENSION VAGINAL EXTRAPERITONEAL(ENPLACE);  Surgeon: Daryn Mccall MD;  Location: [facility];  Service: UroGynecology           MT CYSTOURETHROSCOPY N/A 4/24/2023    Procedure: CYSTO;  Surgeon: Daryn Mccall MD;  Location: [facility];  Service: UroGynecology           MT SLING OPERATION STRESS INCONTINENCE N/A 4/24/2023    Procedure: PV  RONAN (RITESH);  Surgeon: Daryn Mccall MD;  Location: [facility] OR;  Service: UroGynecology              SOCIAL HISTORY:  Social History     Tobacco Use   Smoking Status Light Smoker    Current packs/day: 0.25    Average packs/day: 0.3 packs/day for 60.0 years (15.0 ttl pk-yrs)    Types: Cigarettes   Smokeless Tobacco Never   Tobacco Comments    ready to quit at times          PHYSICAL EXAM:  73 y.o. female sitting comfortably on exam chair in no apparent distress.   Ambulates with normal gait  Able to go up on toes and heels  Able to balance on one leg  TTP over mid thoracic spine  Brisk 2+ patellar reflexes bilaterally       RADIOGRAPHIC STUDIES:  Xrays, thoracic spine, 6/11/2024: Multilevel degenerative changes with mild increased kyphosis.  There is evidence of mild wedge deformity involving the T7 vertebral body.  The upper part of the thoracic spine could not be fully visualized.  MRI, thoracic spine, 7/02/2024: Acute compression deformity at T7.  Chronic deep deformity involving the T4 vertebral body.  There is evidence of edema at T7 consistent with acute injury.  Xrays, thoracic spine, 7/24/2024: Multilevel degenerative disc with increased kyphosis and mild scoliosis.  The fracture seen on prior x-rays slightly more wedge compared to the x-rays from 6/11/2024.    SPINE AND PAIN PROCEDURES:   None      ASSESSMENT:  1. Mid back pain  -     XR spine thoracic 3 vw; Future; Expected date: 07/24/2024  -     Ambulatory Referral to Physical Therapy; Future  2. Osteoporosis with current pathological fracture, unspecified osteoporosis type, initial encounter  -     Ambulatory Referral to Endocrinology; Future  3. Closed wedge compression fracture of T7 vertebra, initial encounter ([facility])  -     Ambulatory Referral to Physical Therapy; Future      PLAN:  73 y.o. female with acute T7 fracture, sustained on 6/11/2024.     Natural history of compression fractures was discussed in the office today. Patient has noticed  improvement of symptoms since onset of symptoms. Therefore, we will hold off on referring patient to IR for kyphoplasty. Patient is advised to start phyiscal therapy at this time. Patient was advised that physical therapy will not improve healing, but should help improve strength and mobility. Patient was also referred to endocrinology for management of osteoporosis as patient is not currently taking medication for osteoporosis management, and it is important to prevent future fractures.     Patient will follow-up in 2 months for re-evaluation.      Scribe Attestation      I,:  Jossie Park PA-C am acting as a scribe while in the presence of the attending physician.:       I,:  Ben Glynn MD personally performed the services described in this documentation    as scribed in my presence.:

## 2024-07-25 ENCOUNTER — APPOINTMENT (OUTPATIENT)
Age: 74
End: 2024-07-25
Payer: MEDICARE

## 2024-07-25 DIAGNOSIS — J44.9 SEVERE CHRONIC OBSTRUCTIVE PULMONARY DISEASE (HCC): ICD-10-CM

## 2024-07-25 DIAGNOSIS — M81.0 AGE-RELATED OSTEOPOROSIS WITHOUT CURRENT PATHOLOGICAL FRACTURE: ICD-10-CM

## 2024-07-25 DIAGNOSIS — E55.9 VITAMIN D DEFICIENCY: ICD-10-CM

## 2024-07-25 DIAGNOSIS — E78.5 HYPERLIPIDEMIA, UNSPECIFIED HYPERLIPIDEMIA TYPE: ICD-10-CM

## 2024-07-25 LAB
25(OH)D3 SERPL-MCNC: 75.8 NG/ML (ref 30–100)
ALBUMIN SERPL BCG-MCNC: 4 G/DL (ref 3.5–5)
ALP SERPL-CCNC: 110 U/L (ref 34–104)
ALT SERPL W P-5'-P-CCNC: 15 U/L (ref 7–52)
ANION GAP SERPL CALCULATED.3IONS-SCNC: 8 MMOL/L (ref 4–13)
AST SERPL W P-5'-P-CCNC: 16 U/L (ref 13–39)
BASOPHILS # BLD AUTO: 0.09 THOUSANDS/ÂΜL (ref 0–0.1)
BASOPHILS NFR BLD AUTO: 1 % (ref 0–1)
BILIRUB SERPL-MCNC: 0.41 MG/DL (ref 0.2–1)
BUN SERPL-MCNC: 13 MG/DL (ref 5–25)
CALCIUM SERPL-MCNC: 9.1 MG/DL (ref 8.4–10.2)
CHLORIDE SERPL-SCNC: 101 MMOL/L (ref 96–108)
CHOLEST SERPL-MCNC: 184 MG/DL
CO2 SERPL-SCNC: 27 MMOL/L (ref 21–32)
CREAT SERPL-MCNC: 0.67 MG/DL (ref 0.6–1.3)
EOSINOPHIL # BLD AUTO: 0.53 THOUSAND/ÂΜL (ref 0–0.61)
EOSINOPHIL NFR BLD AUTO: 5 % (ref 0–6)
ERYTHROCYTE [DISTWIDTH] IN BLOOD BY AUTOMATED COUNT: 14.9 % (ref 11.6–15.1)
GFR SERPL CREATININE-BSD FRML MDRD: 86 ML/MIN/1.73SQ M
GLUCOSE P FAST SERPL-MCNC: 80 MG/DL (ref 65–99)
HCT VFR BLD AUTO: 41.9 % (ref 34.8–46.1)
HDLC SERPL-MCNC: 71 MG/DL
HGB BLD-MCNC: 13.8 G/DL (ref 11.5–15.4)
IMM GRANULOCYTES # BLD AUTO: 0.02 THOUSAND/UL (ref 0–0.2)
IMM GRANULOCYTES NFR BLD AUTO: 0 % (ref 0–2)
LDLC SERPL CALC-MCNC: 97 MG/DL (ref 0–100)
LYMPHOCYTES # BLD AUTO: 2.93 THOUSANDS/ÂΜL (ref 0.6–4.47)
LYMPHOCYTES NFR BLD AUTO: 28 % (ref 14–44)
MCH RBC QN AUTO: 30.2 PG (ref 26.8–34.3)
MCHC RBC AUTO-ENTMCNC: 32.9 G/DL (ref 31.4–37.4)
MCV RBC AUTO: 92 FL (ref 82–98)
MONOCYTES # BLD AUTO: 1.01 THOUSAND/ÂΜL (ref 0.17–1.22)
MONOCYTES NFR BLD AUTO: 10 % (ref 4–12)
NEUTROPHILS # BLD AUTO: 5.8 THOUSANDS/ÂΜL (ref 1.85–7.62)
NEUTS SEG NFR BLD AUTO: 56 % (ref 43–75)
NONHDLC SERPL-MCNC: 113 MG/DL
NRBC BLD AUTO-RTO: 0 /100 WBCS
PLATELET # BLD AUTO: 284 THOUSANDS/UL (ref 149–390)
PMV BLD AUTO: 9.7 FL (ref 8.9–12.7)
POTASSIUM SERPL-SCNC: 3.9 MMOL/L (ref 3.5–5.3)
PROT SERPL-MCNC: 7.2 G/DL (ref 6.4–8.4)
RBC # BLD AUTO: 4.57 MILLION/UL (ref 3.81–5.12)
SODIUM SERPL-SCNC: 136 MMOL/L (ref 135–147)
TRIGL SERPL-MCNC: 80 MG/DL
TSH SERPL DL<=0.05 MIU/L-ACNC: 2.25 UIU/ML (ref 0.45–4.5)
WBC # BLD AUTO: 10.38 THOUSAND/UL (ref 4.31–10.16)

## 2024-07-25 PROCEDURE — 80053 COMPREHEN METABOLIC PANEL: CPT

## 2024-07-25 PROCEDURE — 36415 COLL VENOUS BLD VENIPUNCTURE: CPT

## 2024-07-25 PROCEDURE — 82306 VITAMIN D 25 HYDROXY: CPT

## 2024-07-25 PROCEDURE — 80061 LIPID PANEL: CPT

## 2024-07-25 PROCEDURE — 85025 COMPLETE CBC W/AUTO DIFF WBC: CPT

## 2024-07-25 PROCEDURE — 84443 ASSAY THYROID STIM HORMONE: CPT

## 2024-07-29 ENCOUNTER — OFFICE VISIT (OUTPATIENT)
Dept: INTERNAL MEDICINE CLINIC | Facility: CLINIC | Age: 74
End: 2024-07-29
Payer: MEDICARE

## 2024-07-29 ENCOUNTER — EVALUATION (OUTPATIENT)
Dept: PHYSICAL THERAPY | Facility: CLINIC | Age: 74
End: 2024-07-29
Payer: MEDICARE

## 2024-07-29 VITALS
DIASTOLIC BLOOD PRESSURE: 84 MMHG | BODY MASS INDEX: 21.4 KG/M2 | HEART RATE: 66 BPM | HEIGHT: 63 IN | TEMPERATURE: 97.9 F | WEIGHT: 120.8 LBS | SYSTOLIC BLOOD PRESSURE: 152 MMHG | OXYGEN SATURATION: 98 %

## 2024-07-29 VITALS — HEART RATE: 68 BPM | SYSTOLIC BLOOD PRESSURE: 129 MMHG | DIASTOLIC BLOOD PRESSURE: 71 MMHG

## 2024-07-29 DIAGNOSIS — E78.5 HYPERLIPIDEMIA, UNSPECIFIED HYPERLIPIDEMIA TYPE: ICD-10-CM

## 2024-07-29 DIAGNOSIS — M54.9 MID BACK PAIN: ICD-10-CM

## 2024-07-29 DIAGNOSIS — S22.000A COMPRESSION FRACTURE OF BODY OF THORACIC VERTEBRA (HCC): ICD-10-CM

## 2024-07-29 DIAGNOSIS — M81.0 AGE-RELATED OSTEOPOROSIS WITHOUT CURRENT PATHOLOGICAL FRACTURE: Primary | ICD-10-CM

## 2024-07-29 DIAGNOSIS — E44.1 MILD PROTEIN-CALORIE MALNUTRITION (HCC): ICD-10-CM

## 2024-07-29 DIAGNOSIS — J44.9 SEVERE CHRONIC OBSTRUCTIVE PULMONARY DISEASE (HCC): ICD-10-CM

## 2024-07-29 DIAGNOSIS — Z12.11 SCREENING FOR COLON CANCER: ICD-10-CM

## 2024-07-29 DIAGNOSIS — S22.060D CLOSED WEDGE COMPRESSION FRACTURE OF T7 VERTEBRA WITH ROUTINE HEALING, SUBSEQUENT ENCOUNTER: ICD-10-CM

## 2024-07-29 PROCEDURE — 97110 THERAPEUTIC EXERCISES: CPT | Performed by: PHYSICAL THERAPIST

## 2024-07-29 PROCEDURE — 97162 PT EVAL MOD COMPLEX 30 MIN: CPT | Performed by: PHYSICAL THERAPIST

## 2024-07-29 PROCEDURE — G0439 PPPS, SUBSEQ VISIT: HCPCS | Performed by: FAMILY MEDICINE

## 2024-07-29 PROCEDURE — 99214 OFFICE O/P EST MOD 30 MIN: CPT | Performed by: FAMILY MEDICINE

## 2024-07-29 RX ORDER — ZOLEDRONIC ACID 5 MG/100ML
5 INJECTION, SOLUTION INTRAVENOUS ONCE
OUTPATIENT
Start: 2024-07-29

## 2024-07-29 RX ORDER — SODIUM CHLORIDE 9 MG/ML
20 INJECTION, SOLUTION INTRAVENOUS ONCE
OUTPATIENT
Start: 2024-07-29

## 2024-07-29 NOTE — PROGRESS NOTES
Ambulatory Visit  Name: Elvira Reed      : 1950      MRN: [id number]  Encounter Provider: Karla Coates MD  Encounter Date: 2024   Encounter department: [facility]    Assessment & Plan   1. Age-related osteoporosis without current pathological fracture  -     CBC and differential; Future  -     Vitamin D 25 hydroxy; Future  2. Compression fracture of body of thoracic vertebra (HCC)  -     CBC and differential; Future  3. Severe chronic obstructive pulmonary disease (HCC)  -     CBC and differential; Future  4. Mild protein-calorie malnutrition (HCC)  -     CBC and differential; Future  5. Hyperlipidemia, unspecified hyperlipidemia type  -     CBC and differential; Future  -     Comprehensive metabolic panel; Future  -     Lipid panel; Future  -     TSH, 3rd generation; Future  6. Screening for colon cancer  -     Cologuard    Orders and recommendations as noted above.  Reviewed previous testing with her.  Reviewed back issues and compression fracture.  Discussed ways that we can hopefully prevent any further fractures.  Will have her set up for Reclast.  Discussed the risks and benefits of this.  When she starts the Reclast, she should discontinue the Fosamax.  Continue with calcium and vitamin D supplementation.  Continue to follow-up with back specialist regarding the compression fractures.  Very careful to avoid falls.  Continue with the Trelegy as previously.  Smoking cessation discussed.  Try to increase protein intake.  Remain as active as possible.  Cholesterol has been mildly elevated.  Wishes to avoid additional medications if possible.  Will continue to follow this with routine laboratory testing.  Recommend RSV vaccination.  Recommend flu shot in the fall.  Will have her follow-up in about 3 to 5 months or sooner if needed.    Depression Screening and Follow-up Plan: Patient was screened for depression during today's encounter. They screened negative with  a PHQ-2 score of 0.      Preventive health issues were discussed with patient, and age appropriate screening tests were ordered as noted in patient's After Visit Summary. Personalized health advice and appropriate referrals for health education or preventive services given if needed, as noted in patient's After Visit Summary.    History of Present Illness     She presents for routine follow-up as well as Medicare wellness visit.  Has generally been doing relatively well.  She has noticed improvement in the back pain from the compression fractures.  She did follow-up with back specialist and they are continuing to follow her.  She has resumed most of her normal activities.  Has not been taking anything for the pain at present.  Breathing has been relatively stable.  Continues with the Trelegy.  Appetite has been stable as has weight for the most part.  Denies any nausea, vomiting, or diarrhea.  Usually sleeps relatively well.  Denies any chest pain or palpitations.  No significant cough.       Patient Care Team:  Karla Coates MD as PCP - General (Family Medicine)  Emily Peterson CNM (Midwifery)    Review of Systems   Constitutional:  Negative for activity change, appetite change, chills and fever.   HENT:  Negative for congestion and rhinorrhea.    Eyes:  Negative for visual disturbance.   Respiratory:  Negative for chest tightness and shortness of breath.    Cardiovascular:  Negative for chest pain and palpitations.   Gastrointestinal:  Negative for abdominal pain, blood in stool, diarrhea, nausea and vomiting.   Endocrine: Negative for polydipsia, polyphagia and polyuria.   Genitourinary:  Negative for dysuria, frequency and urgency.   Musculoskeletal:  Positive for arthralgias and back pain. Negative for gait problem.   Skin:  Negative for color change.   Neurological:  Negative for dizziness and headaches.   Hematological:  Does not bruise/bleed easily.   Psychiatric/Behavioral:  Negative for confusion and  sleep disturbance. The patient is not nervous/anxious.      Medical History Reviewed by provider this encounter:       Annual Wellness Visit Questionnaire   Elvira is here for her Subsequent Wellness visit.     Health Risk Assessment:   Patient rates overall health as very good. Patient feels that their physical health rating is same. Patient is satisfied with their life. Eyesight was rated as same. Hearing was rated as same. Patient feels that their emotional and mental health rating is same. Patients states they are never, rarely angry. Patient states they are sometimes unusually tired/fatigued. Pain experienced in the last 7 days has been a lot. Patient's pain rating has been 7/10. Patient states that she has experienced no weight loss or gain in last 6 months.     Depression Screening:   PHQ-2 Score: 0      Fall Risk Screening:   In the past year, patient has experienced: no history of falling in past year      Urinary Incontinence Screening:   Patient has not leaked urine accidently in the last six months.     Home Safety:  Patient does not have trouble with stairs inside or outside of their home. Patient has working smoke alarms and has working carbon monoxide detector. Home safety hazards include: none.     Nutrition:   Current diet is No Added Salt.     Medications:   Patient is currently taking over-the-counter supplements. OTC medications include: see medication list. Patient is able to manage medications.     Activities of Daily Living (ADLs)/Instrumental Activities of Daily Living (IADLs):   Walk and transfer into and out of bed and chair?: Yes  Dress and groom yourself?: Yes    Bathe or shower yourself?: Yes    Feed yourself? Yes  Do your laundry/housekeeping?: Yes  Manage your money, pay your bills and track your expenses?: Yes  Make your own meals?: Yes    Do your own shopping?: Yes    ADL comments: I do have someone do some heavy cleaning    Previous Hospitalizations:   Any hospitalizations or ED  visits within the last 12 months?: No      Advance Care Planning:   Living will: Yes    Durable POA for healthcare: Yes    Advanced directive: Yes    Provider agrees with end of life decisions: Yes      Cognitive Screening:   Provider or family/friend/caregiver concerned regarding cognition?: No    PREVENTIVE SCREENINGS      Cardiovascular Screening:    General: Screening Not Indicated and History Lipid Disorder      Diabetes Screening:     General: Screening Current      Colorectal Cancer Screening:     General: Risks and Benefits Discussed    Due for: Cologuard      Breast Cancer Screening:     General: Screening Current      Cervical Cancer Screening:    General: Screening Not Indicated      Osteoporosis Screening:    General: Screening Not Indicated and History Osteoporosis      Abdominal Aortic Aneurysm (AAA) Screening:        General: Screening Not Indicated      Lung Cancer Screening:     General: Screening Not Indicated      Hepatitis C Screening:    General: Screening Not Indicated    Screening, Brief Intervention, and Referral to Treatment (SBIRT)    Screening  Typical number of drinks in a day: 0  Typical number of drinks in a week: 0  Interpretation: Low risk drinking behavior.    AUDIT-C Screenin) How often did you have a drink containing alcohol in the past year? never  2) How many drinks did you have on a typical day when you were drinking in the past year? 0  3) How often did you have 6 or more drinks on one occasion in the past year? never    AUDIT-C Score: 0  Interpretation: Score 0-2 (female): Negative screen for alcohol misuse    Single Item Drug Screening:  How often have you used an illegal drug (including marijuana) or a prescription medication for non-medical reasons in the past year? never    Single Item Drug Screen Score: 0  Interpretation: Negative screen for possible drug use disorder    Brief Intervention  Alcohol & drug use screenings were reviewed. No concerns regarding substance  "use disorder identified.     Social Determinants of Health     Financial Resource Strain: Low Risk  (7/24/2023)    Overall Financial Resource Strain (CARDIA)     Difficulty of Paying Living Expenses: Not hard at all   Food Insecurity: No Food Insecurity (7/29/2024)    Hunger Vital Sign     Worried About Running Out of Food in the Last Year: Never true     Ran Out of Food in the Last Year: Never true   Transportation Needs: No Transportation Needs (7/29/2024)    PRAPARE - Transportation     Lack of Transportation (Medical): No     Lack of Transportation (Non-Medical): No   Housing Stability: Low Risk  (7/29/2024)    Housing Stability Vital Sign     Unable to Pay for Housing in the Last Year: No     Number of Times Moved in the Last Year: 1     Homeless in the Last Year: No   Utilities: Not At Risk (7/29/2024)    [facility] Utilities     Threatened with loss of utilities: No     No results found.    Objective     /84 (BP Location: Left arm, Patient Position: Sitting, Cuff Size: Standard)   Pulse 66   Temp 97.9 °F (36.6 °C)   Ht 5' 3\" (1.6 m)   Wt 54.8 kg (120 lb 12.8 oz)   LMP  (LMP Unknown)   SpO2 98%   BMI 21.40 kg/m²     Physical Exam  Vitals and nursing note reviewed.   Constitutional:       General: She is not in acute distress.     Appearance: She is well-developed, well-groomed and underweight.   HENT:      Head: Normocephalic and atraumatic.   Eyes:      General:         Right eye: No discharge.         Left eye: No discharge.      Conjunctiva/sclera: Conjunctivae normal.      Pupils: Pupils are equal, round, and reactive to light.   Cardiovascular:      Rate and Rhythm: Normal rate and regular rhythm.      Heart sounds: Normal heart sounds. No murmur heard.     No friction rub. No gallop.   Pulmonary:      Effort: No respiratory distress.      Breath sounds: Decreased breath sounds present. No wheezing or rales.   Abdominal:      General: Bowel sounds are normal. There is no distension.      Tenderness: " There is no abdominal tenderness.   Lymphadenopathy:      Cervical: No cervical adenopathy.   Skin:     General: Skin is warm and dry.   Neurological:      Mental Status: She is alert and oriented to person, place, and time.   Psychiatric:         Mood and Affect: Mood and affect normal.         Speech: Speech normal.         Behavior: Behavior normal. Behavior is cooperative.         Cognition and Memory: Cognition and memory normal.

## 2024-07-29 NOTE — PROGRESS NOTES
PT Evaluation     Today's date: 2024  Patient name: Elvira Reed  : 1950  MRN: [id number]  Referring provider: Jossie Park PA-C  Dx:   Encounter Diagnosis     ICD-10-CM    1. Mid back pain  M54.9 Ambulatory Referral to Physical Therapy      2. Closed wedge compression fracture of T7 vertebra with routine healing, subsequent encounter  S22.550D Ambulatory Referral to Physical Therapy                     Assessment  Impairments: abnormal or restricted ROM, abnormal movement, activity intolerance, impaired physical strength, lacks appropriate home exercise program, pain with function, weight-bearing intolerance, poor posture , participation limitations and activity limitations  Functional limitations: avoiding bending, lifting, carrying; receives assistance with laundry; receives assistance with household chores (dusting, vacuuming)    Assessment details: Elvira Reed is a 73 y.o. female who presents with complaints of acute onset Mid back pain. She is diagnosed with a closed wedge compression fracture of T7 vertebra with routine healing. No further referral appears necessary at this time based upon examination results.  Patient presents with poor posture and will benefit from skilled PT services for strengthening and postural education as well as flexibility exercises to improve overall mobility, pain level and quality of life.  Prognosis is good given HEP compliance and PT 2x/wk.  Please contact me if you have any questions or recommendations.  Thank you for the opportunity to share in  Elvira's care.     Understanding of Dx/Px/POC: good     Prognosis: good    Goals  STGs  1) IN 4 weeks patient will report pain levels decreased 3 points or more  2) In 4 weeks patient will demonstrate 10 deg improvement in HS flexibility b/l  3) IN 4 weeks patient will demonstrate 1/2 grade improvement in MMT of shoulder flexion b/l and pain free during MMT    LTGs  1) In 6-12 weeks patient will   report little to no difficulty with completing household chores including vacuuming  2) In 6-12 weeks patient will report little to no difficulty with carrying laundry basket up/down stairs  3) In 6-12 weeks patient will demonstrate independence with HEP and readiness to join fitness center.    Plan  Patient would benefit from: skilled physical therapy  Referral necessary: No  Planned modality interventions: cryotherapy and thermotherapy: hydrocollator packs    Planned therapy interventions: IASTM, joint mobilization, kinesiology taping, manual therapy, massage, Timmons taping, abdominal trunk stabilization, balance, balance/weight bearing training, neuromuscular re-education, postural training, self care, strengthening, stretching, therapeutic activities, therapeutic exercise, flexibility, functional ROM exercises and home exercise program    Frequency: 2x week  Duration in weeks: 12  Plan of Care beginning date: 7/29/2024  Plan of Care expiration date: 10/21/2024  Treatment plan discussed with: patient        Subjective Evaluation    History of Present Illness  Mechanism of injury: -presents to PT with c/o mid-back pain  -onset of pain in June, no specific YOVANY  -was seen in [facility] and referred to spine and pain, later to Dr. Farias  -imaging as described below:    RADIOGRAPHIC STUDIES:  1. Xrays, thoracic spine, 6/11/2024: Multilevel degenerative changes with mild increased kyphosis.  There is evidence of mild wedge deformity involving the T7 vertebral body.  The upper part of the thoracic spine could not be fully visualized.  2. MRI, thoracic spine, 7/02/2024: Acute compression deformity at T7.  Chronic deep deformity involving the T4 vertebral body.  There is evidence of edema at T7 consistent with acute injury.  3. Xrays, thoracic spine, 7/24/2024: Multilevel degenerative disc with increased kyphosis and mild scoliosis.  The fracture seen on prior x-rays slightly more wedge compared to the x-rays from  2024.    -now only having pain with prolonged sitting  -works as a , bought herself a new seat cushion and back support for her office chair  -generally pain has been improving  -she has been trying not to take anything for the pain, can go a few days without any Aleve  -she has been very careful since being diagnosed with the wedge fracture  -she is limiting her lifting/carrying, asking son for help with carrying laundry basket up/down to basement  -she has been getting help with her house cleaning, hired someone for the time being  -she has not been able to enjoy her gardening due to this problem  -pain is primarily along the bra line, across both sides of her back  -no problems sleeping  -next fu with Dr. Schaffer is scheduled for 10/1  -Dr. Coates has been taking Fosomax for 3-4 years but will be getting scheduled for an infusion  Patient Goals  Patient goals for therapy: decreased pain and independence with ADLs/IADLs  Patient goal: begin routine exercise; be able to walk through [place] in October (10 day trip)  Pain  Current pain ratin  At best pain ratin  At worst pain ratin    Social Support  Stairs in house: yes (basement stairs)   Lives with: alone    Employment status: working  Treatments  No previous or current treatments        Objective     Static Posture     Head  Forward.    Thoracic Spine  Hyperkyphosis.    Postural Observations  Seated posture: poor  Correction of posture: makes symptoms better      Tenderness     Additional Tenderness Details  (+) TTP along T7 SP and b/l Tps  (+) TTP along rhomboids b/l     Strength/Myotome Testing     Left Shoulder     Planes of Motion   Flexion: 4-   Abduction: 4   Internal rotation at 0°: 4     Right Shoulder     Planes of Motion   Flexion: 4-   Abduction: 4   External rotation at 0°: 4   Internal rotation at 0°: 4     Left Elbow   Flexion: 4  Extension: 4    Right Elbow   Flexion: 4  Extension: 4    Lumbar   Left   Normal  "strength    Right   Normal strength    Additional Strength Details  Pain reproduced with MMT flexion    Tests     Left Hip   90/90 SLR: Positive.     Right Hip   90/90 SLR: Positive.     Additional Tests Details  HS 90-90: Lacks 35 deg active knee ext b/l             Precautions: osteoporosis, T7 wedge compression fracture  POC exp 10/21/24  HEP Access Code: [email or web address] - updated 7/29/24    Manuals 7/29                                       Neuro Re-Ed        UBE retro for posture        Slouch overcorrect in front of mirror Seated  5\" x10       TB rows        TB pull downs        TB b/l ER        TB anti-trunk rotation        TB horizontal abduction        Chin tucks        Supine serratus press        Bridges                Ther Ex                Doorway pec stretch 30\"x4       HS stretch Step stool  30\"x4 ea       Mini squats        Step ups                                Ther Activity                                                Modalities                           Access Code: [email or web address]  URL: [email or web address]  Date: 07/29/2024  Prepared by: Carmina Olson    Exercises  - Slouch Overcorrect on Swiss Ball  - 3 x daily - 7 x weekly - 10 reps - 5 sec hold  - Doorway Pec Stretch at 60 Degrees Abduction with Arm Straight  - 3 x daily - 7 x weekly - 4 reps - 30 sec hold  - Standing Hamstring Stretch with Step  - 3 x daily - 7 x weekly - 4 reps - 30 sec hold    "

## 2024-07-30 ENCOUNTER — OFFICE VISIT (OUTPATIENT)
Dept: PHYSICAL THERAPY | Facility: CLINIC | Age: 74
End: 2024-07-30
Payer: MEDICARE

## 2024-07-30 DIAGNOSIS — M54.9 MID BACK PAIN: Primary | ICD-10-CM

## 2024-07-30 DIAGNOSIS — S22.060D CLOSED WEDGE COMPRESSION FRACTURE OF T7 VERTEBRA WITH ROUTINE HEALING, SUBSEQUENT ENCOUNTER: ICD-10-CM

## 2024-07-30 PROCEDURE — 97110 THERAPEUTIC EXERCISES: CPT

## 2024-07-30 PROCEDURE — 97112 NEUROMUSCULAR REEDUCATION: CPT

## 2024-07-30 NOTE — PROGRESS NOTES
Problem: Pain  Goal: #Acceptable pain level achieved/maintained at rest using NRS/Faces  Description: This goal is used for patients who can self-report.  Acceptable means the level is at or below the identified comfort/function goal.  Outcome: Outcome Met, Continue evaluating goal progress toward completion      "Daily Note     Today's date: 2024  Patient name: Elvira Reed  : 1950  MRN: [id number]  Referring provider: Jossie Park PA-C  Dx:   Encounter Diagnosis     ICD-10-CM    1. Mid back pain  M54.9       2. Closed wedge compression fracture of T7 vertebra with routine healing, subsequent encounter  S22.701D                      Subjective: Pt has been compliant with her HEP. She was seated at a picnic table majority of her day. This has cause her some thoracic soreness.       Objective: See treatment diary below      Assessment: Initiated multiple self stretches and strengthening exercises as outlined in pt's POC. Tolerated treatment well. Patient demonstrated fatigue post treatment, exhibited good technique with therapeutic exercises, and would benefit from continued PT      Plan: Continue per plan of care.  Progress treatment as tolerated.       Precautions: osteoporosis, T7 wedge compression fracture  POC exp 10/21/24  HEP Access Code: [email or web address] - updated 24    Manuals                                       Neuro Re-Ed        UBE retro for posture  120 RPM x8 min      Slouch overcorrect in front of mirror Seated  5\" x10 Seated  5\" 2x10      TB rows  GTB 5\" x10      TB pull downs  GTB 5\" x10      TB b/l ER        TB anti-trunk rotation  GTB 5\" x10      TB horizontal abduction        Chin tucks  Supine 5\" x10      Supine serratus press  5\" 2x10      Bridges  5\" 2x10              Ther Ex                Doorway pec stretch 30\"x4 30\"x4      HS stretch Step stool  30\"x4 ea Step stool  30\"x4 ea      Mini squats  2x10      Step ups  6\" x20 ea                               Ther Activity                                                Modalities                             "

## 2024-08-05 ENCOUNTER — OFFICE VISIT (OUTPATIENT)
Dept: PHYSICAL THERAPY | Facility: CLINIC | Age: 74
End: 2024-08-05
Payer: MEDICARE

## 2024-08-05 DIAGNOSIS — S22.060D CLOSED WEDGE COMPRESSION FRACTURE OF T7 VERTEBRA WITH ROUTINE HEALING, SUBSEQUENT ENCOUNTER: ICD-10-CM

## 2024-08-05 DIAGNOSIS — M54.9 MID BACK PAIN: Primary | ICD-10-CM

## 2024-08-05 PROCEDURE — 97112 NEUROMUSCULAR REEDUCATION: CPT | Performed by: PHYSICAL THERAPIST

## 2024-08-05 PROCEDURE — 97110 THERAPEUTIC EXERCISES: CPT | Performed by: PHYSICAL THERAPIST

## 2024-08-05 NOTE — PROGRESS NOTES
"Daily Note     Today's date: 2024  Patient name: Elvira Reed  : 1950  MRN: [id number]  Referring provider: Jossie Park PA-C  Dx:   Encounter Diagnosis     ICD-10-CM    1. Mid back pain  M54.9       2. Closed wedge compression fracture of T7 vertebra with routine healing, subsequent encounter  S22.486A                      Subjective: Patient reports she was okay after her last treatment.       Objective: See treatment diary below      Assessment: Tolerated treatment well. Patient demonstrated fatigue post treatment, exhibited good technique with therapeutic exercises, and would benefit from continued PT      Plan: Continue per plan of care.  Progress treatment as tolerated.       Precautions: osteoporosis, T7 wedge compression fracture  POC exp 10/21/24  HEP Access Code: [email or web address] - updated 24    Manuals                                      Neuro Re-Ed        UBE retro for posture  120 RPM x8 min 120 RPM x8 min     Slouch overcorrect in front of mirror Seated  5\" x10 Seated  5\" 2x10 Seated  5\" 2x10     TB rows  GTB 5\" x10 GTB 5\" 2x10     TB pull downs  GTB 5\" x10 GTB 5\" 2x10     TB b/l ER        TB anti-trunk rotation  GTB 5\" x10 GTB 5\" 2x10     TB horizontal abduction        Chin tucks  Supine 5\" x10 Supine 5\" 2x10     Supine serratus press  5\" 2x10 1# 5\" 2x10     Bridges  5\" 2x10 5\" 2x10             Ther Ex                Doorway pec stretch 30\"x4 30\"x4 30\"x4     HS stretch Step stool  30\"x4 ea Step stool  30\"x4 ea Step stool  30\"x4 ea     Mini squats  2x10 2x10     Step ups  6\" x20 ea  6\" x20 ea                             Ther Activity                                                Modalities                               "

## 2024-08-08 ENCOUNTER — OFFICE VISIT (OUTPATIENT)
Dept: PHYSICAL THERAPY | Facility: CLINIC | Age: 74
End: 2024-08-08
Payer: MEDICARE

## 2024-08-08 DIAGNOSIS — S22.060D CLOSED WEDGE COMPRESSION FRACTURE OF T7 VERTEBRA WITH ROUTINE HEALING, SUBSEQUENT ENCOUNTER: ICD-10-CM

## 2024-08-08 DIAGNOSIS — M54.9 MID BACK PAIN: Primary | ICD-10-CM

## 2024-08-08 PROCEDURE — 97112 NEUROMUSCULAR REEDUCATION: CPT | Performed by: PHYSICAL THERAPIST

## 2024-08-08 PROCEDURE — 97110 THERAPEUTIC EXERCISES: CPT | Performed by: PHYSICAL THERAPIST

## 2024-08-08 NOTE — PROGRESS NOTES
"Daily Note     Today's date: 2024  Patient name: Elvira Reed  : 1950  MRN: [id number]  Referring provider: Jossie Park PA-C  Dx:   Encounter Diagnosis     ICD-10-CM    1. Mid back pain  M54.9       2. Closed wedge compression fracture of T7 vertebra with routine healing, subsequent encounter  S22.443S                      Subjective: Patient reports she is feeling pretty good. No complaints since last visit. She reports today was the first day she did not have pain at work.       Objective: See treatment diary below      Assessment: Tolerated treatment well. Patient is making steady progress toward goals.  Patient demonstrated fatigue post treatment, exhibited good technique with therapeutic exercises, and would benefit from continued PT      Plan: Continue per plan of care.  Progress treatment as tolerated.       Precautions: osteoporosis, T7 wedge compression fracture  POC exp 10/21/24  HEP Access Code: [email or web address] - updated 24    Manuals                                     Neuro Re-Ed        UBE retro for posture  120 RPM x8 min 120 RPM x8 min 120 rpm  8 mins    Slouch overcorrect in front of mirror Seated  5\" x10 Seated  5\" 2x10 Seated  5\" 2x10 Seated  5\" 2x10    TB rows  GTB 5\" x10 GTB 5\" 2x10 GTB  5\" 2x10    TB pull downs  GTB 5\" x10 GTB 5\" 2x10 GTB  5\" 2x10    TB b/l ER    GTB  5\" 2x10    TB anti-trunk rotation  GTB 5\" x10 GTB 5\" 2x10 GTB  5\" 2x10 ea    TB horizontal abduction        Chin tucks  Supine 5\" x10 Supine 5\" 2x10 Supine 5\" 2x10    Supine serratus press  5\" 2x10 1# 5\" 2x10 2# 5\" 2x10    Bridges  5\" 2x10 5\" 2x10 5\" 2x10            Ther Ex                Doorway pec stretch 30\"x4 30\"x4 30\"x4 30\"x4    HS stretch Step stool  30\"x4 ea Step stool  30\"x4 ea Step stool  30\"x4 ea Step stool  30\" x3 ea    Mini squats  2x10 2x10 2x10    Step ups  6\" x20 ea  6\" x20 ea 6\" x20 ea                            Ther Activity                                              "   Modalities

## 2024-08-12 ENCOUNTER — OFFICE VISIT (OUTPATIENT)
Dept: PHYSICAL THERAPY | Facility: CLINIC | Age: 74
End: 2024-08-12
Payer: MEDICARE

## 2024-08-12 DIAGNOSIS — M54.9 MID BACK PAIN: Primary | ICD-10-CM

## 2024-08-12 DIAGNOSIS — S22.060D CLOSED WEDGE COMPRESSION FRACTURE OF T7 VERTEBRA WITH ROUTINE HEALING, SUBSEQUENT ENCOUNTER: ICD-10-CM

## 2024-08-12 PROCEDURE — 97110 THERAPEUTIC EXERCISES: CPT

## 2024-08-12 PROCEDURE — 97112 NEUROMUSCULAR REEDUCATION: CPT

## 2024-08-12 NOTE — PROGRESS NOTES
"Daily Note     Today's date: 2024  Patient name: Elvira Reed  : 1950  MRN: [id number]  Referring provider: Jossie Park PA-C  Dx:   Encounter Diagnosis     ICD-10-CM    1. Mid back pain  M54.9       2. Closed wedge compression fracture of T7 vertebra with routine healing, subsequent encounter  S22.790D                      Subjective: Pt reports she was a little sore over the weekend but did do a lot of cooking and had company at her house.       Objective: See treatment diary below      Assessment: Tolerated treatment well. Occasional verbal cues for proper exercise performance. Patient demonstrated fatigue post treatment, exhibited good technique with therapeutic exercises, and would benefit from continued PT      Plan: Continue per plan of care.      Precautions: osteoporosis, T7 wedge compression fracture  POC exp 10/21/24  HEP Access Code: [email or web address] - updated 24    Manuals                                    Neuro Re-Ed        UBE retro for posture  120 RPM x8 min 120 RPM x8 min 120 rpm  8 mins 120  rpm x8 min   Slouch overcorrect in front of mirror Seated  5\" x10 Seated  5\" 2x10 Seated  5\" 2x10 Seated  5\" 2x10 Seated  5\" 2x10   TB rows  GTB 5\" x10 GTB 5\" 2x10 GTB  5\" 2x10 GTB  5\" x20   TB pull downs  GTB 5\" x10 GTB 5\" 2x10 GTB  5\" 2x10 GTB   TB b/l ER    GTB  5\" 2x10 GTB  5\" x20   TB anti-trunk rotation  GTB 5\" x10 GTB 5\" 2x10 GTB  5\" 2x10 ea GTB  5\" 2x10 ea   TB horizontal abduction        Chin tucks  Supine 5\" x10 Supine 5\" 2x10 Supine 5\" 2x10 Supine 5\" 2x10   Supine serratus press  5\" 2x10 1# 5\" 2x10 2# 5\" 2x10 2# 5\" 2x10   Bridges  5\" 2x10 5\" 2x10 5\" 2x10 5\" 2x10           Ther Ex                Doorway pec stretch 30\"x4 30\"x4 30\"x4 30\"x4 30\" x4 ea   HS stretch Step stool  30\"x4 ea Step stool  30\"x4 ea Step stool  30\"x4 ea Step stool  30\" x3 ea On Step   30\" x4   Mini squats  2x10 2x10 2x10 2x10   Step ups  6\" x20 ea  6\" x20 ea 6\" x20 ea 6\" x20 ea         "                   Ther Activity                                                Modalities

## 2024-08-14 LAB — COLOGUARD RESULT REPORTABLE: NEGATIVE

## 2024-08-15 ENCOUNTER — HOSPITAL ENCOUNTER (OUTPATIENT)
Dept: INFUSION CENTER | Facility: HOSPITAL | Age: 74
End: 2024-08-15

## 2024-08-15 ENCOUNTER — HOSPITAL ENCOUNTER (OUTPATIENT)
Dept: INFUSION CENTER | Facility: HOSPITAL | Age: 74
End: 2024-08-15
Payer: MEDICARE

## 2024-08-15 ENCOUNTER — OFFICE VISIT (OUTPATIENT)
Dept: PHYSICAL THERAPY | Facility: CLINIC | Age: 74
End: 2024-08-15
Payer: MEDICARE

## 2024-08-15 VITALS
OXYGEN SATURATION: 97 % | TEMPERATURE: 97.4 F | SYSTOLIC BLOOD PRESSURE: 163 MMHG | HEART RATE: 68 BPM | DIASTOLIC BLOOD PRESSURE: 73 MMHG

## 2024-08-15 DIAGNOSIS — M81.0 AGE-RELATED OSTEOPOROSIS WITHOUT CURRENT PATHOLOGICAL FRACTURE: Primary | ICD-10-CM

## 2024-08-15 DIAGNOSIS — M54.9 MID BACK PAIN: Primary | ICD-10-CM

## 2024-08-15 DIAGNOSIS — S22.060D CLOSED WEDGE COMPRESSION FRACTURE OF T7 VERTEBRA WITH ROUTINE HEALING, SUBSEQUENT ENCOUNTER: ICD-10-CM

## 2024-08-15 DIAGNOSIS — S22.000A COMPRESSION FRACTURE OF BODY OF THORACIC VERTEBRA (HCC): ICD-10-CM

## 2024-08-15 PROCEDURE — 97110 THERAPEUTIC EXERCISES: CPT | Performed by: PHYSICAL THERAPIST

## 2024-08-15 PROCEDURE — 97112 NEUROMUSCULAR REEDUCATION: CPT | Performed by: PHYSICAL THERAPIST

## 2024-08-15 PROCEDURE — 96365 THER/PROPH/DIAG IV INF INIT: CPT

## 2024-08-15 RX ORDER — ZOLEDRONIC ACID 5 MG/100ML
5 INJECTION, SOLUTION INTRAVENOUS ONCE
Status: COMPLETED | OUTPATIENT
Start: 2024-08-15 | End: 2024-08-15

## 2024-08-15 RX ORDER — ZOLEDRONIC ACID 5 MG/100ML
5 INJECTION, SOLUTION INTRAVENOUS ONCE
OUTPATIENT
Start: 2024-09-01

## 2024-08-15 RX ORDER — SODIUM CHLORIDE 9 MG/ML
20 INJECTION, SOLUTION INTRAVENOUS ONCE
OUTPATIENT
Start: 2024-09-01

## 2024-08-15 RX ORDER — SODIUM CHLORIDE 9 MG/ML
20 INJECTION, SOLUTION INTRAVENOUS ONCE
Status: COMPLETED | OUTPATIENT
Start: 2024-08-15 | End: 2024-08-15

## 2024-08-15 RX ADMIN — SODIUM CHLORIDE 20 ML/HR: 0.9 INJECTION, SOLUTION INTRAVENOUS at 11:36

## 2024-08-15 RX ADMIN — ZOLEDRONIC ACID 5 MG: 5 INJECTION, SOLUTION INTRAVENOUS at 11:32

## 2024-08-15 NOTE — PROGRESS NOTES
Elvira Reed  tolerated reclast treatment well with no complications.      Elvira Reed is aware she needs to follow up with her primary md to reschedule next years reclast infusion.     AVS printed and given to Elvira Reed:  No (Declined by Elvira Reed)

## 2024-08-15 NOTE — PROGRESS NOTES
"Daily Note     Today's date: 8/15/2024  Patient name: Elvira Reed  : 1950  MRN: [id number]  Referring provider: Jossie Park PA-C  Dx:   Encounter Diagnosis     ICD-10-CM    1. Mid back pain  M54.9       2. Closed wedge compression fracture of T7 vertebra with routine healing, subsequent encounter  S22.347S                      Subjective: Patient reports she is more sore today. She is not sure why, but it may be from sitting for a while during her infusion treatment earlier today.       Objective: See treatment diary below      Assessment: Tolerated treatment well. Added resistance to slouch overcorrect exercise this date with good tolerance. Patient demonstrated fatigue post treatment, exhibited good technique with therapeutic exercises, and would benefit from continued PT      Plan: Continue per plan of care.  Progress treatment as tolerated.       Precautions: osteoporosis, T7 wedge compression fracture  POC exp 10/21/24  HEP Access Code: [email or web address] - updated 24    Manuals 8/15 7/30 8/5 8/8 8/12                                   Neuro Re-Ed        UBE retro for posture 120 rpm  X8 mins 120 RPM x8 min 120 RPM x8 min 120 rpm  8 mins 120  rpm x8 min   Slouch overcorrect in front of mirror With TB resistance     Green TB    5\" x20 Seated  5\" 2x10 Seated  5\" 2x10 Seated  5\" 2x10 Seated  5\" 2x10   TB rows GTB  5\" x20 GTB 5\" x10 GTB 5\" 2x10 GTB  5\" 2x10 GTB  5\" x20   TB pull downs GTB  5\" x20 GTB 5\" x10 GTB 5\" 2x10 GTB  5\" 2x10 GTB   TB b/l ER GTB  5\" x20   GTB  5\" 2x10 GTB  5\" x20   TB anti-trunk rotation GTB 5\" x20 GTB 5\" x10 GTB 5\" 2x10 GTB  5\" 2x10 ea GTB  5\" 2x10 ea   TB horizontal abduction        Chin tucks Seated  5\" x20 Supine 5\" x10 Supine 5\" 2x10 Supine 5\" 2x10 Supine 5\" 2x10   Supine serratus press  5\" 2x10 1# 5\" 2x10 2# 5\" 2x10 2# 5\" 2x10   Bridges  5\" 2x10 5\" 2x10 5\" 2x10 5\" 2x10           Ther Ex                Doorway pec stretch 30\"x4 30\"x4 30\"x4 30\"x4 30\" x4 ea   HS stretch 6\" " "step  30\"x3 ea Step stool  30\"x4 ea Step stool  30\"x4 ea Step stool  30\" x3 ea On Step   30\" x4   Mini squats 2\" 2x10 2x10 2x10 2x10 2x10   Step ups 6\" step  X20 ea 6\" x20 ea  6\" x20 ea 6\" x20 ea 6\" x20 ea                           Ther Activity                                                Modalities                                     "

## 2024-08-19 ENCOUNTER — OFFICE VISIT (OUTPATIENT)
Dept: PHYSICAL THERAPY | Facility: CLINIC | Age: 74
End: 2024-08-19
Payer: MEDICARE

## 2024-08-19 DIAGNOSIS — S22.060D CLOSED WEDGE COMPRESSION FRACTURE OF T7 VERTEBRA WITH ROUTINE HEALING, SUBSEQUENT ENCOUNTER: ICD-10-CM

## 2024-08-19 DIAGNOSIS — M54.9 MID BACK PAIN: Primary | ICD-10-CM

## 2024-08-19 PROCEDURE — 97112 NEUROMUSCULAR REEDUCATION: CPT

## 2024-08-19 PROCEDURE — 97110 THERAPEUTIC EXERCISES: CPT

## 2024-08-19 NOTE — PROGRESS NOTES
"Daily Note     Today's date: 2024  Patient name: Elvira Reed  : 1950  MRN: [id number]  Referring provider: Jossie Park PA-C  Dx:   Encounter Diagnosis     ICD-10-CM    1. Mid back pain  M54.9       2. Closed wedge compression fracture of T7 vertebra with routine healing, subsequent encounter  S22.162X                      Subjective: Patient reports she continues to have more good days then bad days. Notes a little soreness earlier today but is feeling good overall.       Objective: See treatment diary below      Assessment: Tolerated treatment well. Pt continues to respond well to all exercises this session with no increased symptoms. Held any progressions due to being sore this morning to avoid increased soreness. Patient demonstrated fatigue post treatment, exhibited good technique with therapeutic exercises, and would benefit from continued PT      Plan: Continue per plan of care.  Progress treatment as tolerated.       Precautions: osteoporosis, T7 wedge compression fracture  POC exp 10/21/24  HEP Access Code: [email or web address] - updated 24    Manuals 8/15 8/19  8/8 8/12                                   Neuro Re-Ed        UBE retro for posture 120 rpm  X8 mins 120 rpm x8 min  120 rpm  8 mins 120  rpm x8 min   Slouch overcorrect in front of mirror With TB resistance     Green TB    5\" x20 With TB resistance     Green TB    5\" x20  Seated  5\" 2x10 Seated  5\" 2x10   TB rows GTB  5\" x20 GTB  5\" x20  GTB  5\" 2x10 GTB  5\" x20   TB pull downs GTB  5\" x20 GTB  5\" x20  GTB  5\" 2x10 GTB   TB b/l ER GTB  5\" x20 GTB 5\"x20  GTB  5\" 2x10 GTB  5\" x20   TB anti-trunk rotation GTB 5\" x20 GTB 5\" x20  GTB  5\" 2x10 ea GTB  5\" 2x10 ea   TB horizontal abduction        Chin tucks Seated  5\" x20 Seated  5\" x20  Supine 5\" 2x10 Supine 5\" 2x10   Supine serratus press    2# 5\" 2x10 2# 5\" 2x10   Bridges    5\" 2x10 5\" 2x10           Ther Ex                Doorway pec stretch 30\"x4 30\"x4  30\"x4 30\" x4 ea   HS stretch " "6\" step  30\"x3 ea 6\" step  30\"x3 ea  Step stool  30\" x3 ea On Step   30\" x4   Mini squats 2\" 2x10 2\" 2x10  2x10 2x10   Step ups 6\" step  X20 ea 6\" step  X20 ea  6\" x20 ea 6\" x20 ea                           Ther Activity                                                Modalities                                     "

## 2024-08-22 ENCOUNTER — OFFICE VISIT (OUTPATIENT)
Dept: PHYSICAL THERAPY | Facility: CLINIC | Age: 74
End: 2024-08-22
Payer: MEDICARE

## 2024-08-22 DIAGNOSIS — M54.9 MID BACK PAIN: Primary | ICD-10-CM

## 2024-08-22 DIAGNOSIS — S22.060D CLOSED WEDGE COMPRESSION FRACTURE OF T7 VERTEBRA WITH ROUTINE HEALING, SUBSEQUENT ENCOUNTER: ICD-10-CM

## 2024-08-22 PROCEDURE — 97112 NEUROMUSCULAR REEDUCATION: CPT | Performed by: PHYSICAL THERAPIST

## 2024-08-22 NOTE — PROGRESS NOTES
"Daily Note     Today's date: 2024  Patient name: Elvira Reed  : 1950  MRN: [id number]  Referring provider: Jossie Park PA-C  Dx:   Encounter Diagnosis     ICD-10-CM    1. Mid back pain  M54.9       2. Closed wedge compression fracture of T7 vertebra with routine healing, subsequent encounter  S22.531D                      Subjective: Patient reports today was the first day back in the office where she did not have any pain. \"I'm feeling really good about that.\"       Objective: See treatment diary below      Assessment: Tolerated treatment well. Patient is making good steady progress toward her goals. Pain levels are improving and patient is getting through her daily routines without experiencing symptoms. Patient demonstrated fatigue post treatment, exhibited good technique with therapeutic exercises, and would benefit from continued PT      Plan: Continue per plan of care.  Progress treatment as tolerated.       Precautions: osteoporosis, T7 wedge compression fracture  POC exp 10/21/24  HEP Access Code: [email or web address] - updated 24    Manuals 8/15 8/19 8/22 8/8 8/12                                   Neuro Re-Ed        UBE retro for posture 120 rpm  X8 mins 120 rpm x8 min 120 rpm  8 mins 120 rpm  8 mins 120  rpm x8 min   Slouch overcorrect in front of mirror With TB resistance     Green TB    5\" x20 With TB resistance     Green TB    5\" x20 With TB resistance     Green TB    5\" x20 Seated  5\" 2x10 Seated  5\" 2x10   TB rows GTB  5\" x20 GTB  5\" x20 Blue TB  5\" x20 GTB  5\" 2x10 GTB  5\" x20   TB pull downs GTB  5\" x20 GTB  5\" x20 Blue TB  5\" x20 GTB  5\" 2x10 GTB   TB b/l ER GTB  5\" x20 GTB 5\"x20 Blue   5\" X20  GTB  5\" 2x10 GTB  5\" x20   TB anti-trunk rotation GTB 5\" x20 GTB 5\" x20 Blue TB  5\" 2x10 ea GTB  5\" 2x10 ea GTB  5\" 2x10 ea   TB horizontal abduction        Chin tucks Seated  5\" x20 Seated  5\" x20 Seated 5\"x20 Supine 5\" 2x10 Supine 5\" 2x10   Supine serratus press   2# 5\" x20 2# 5\" 2x10 " "2# 5\" 2x10   Bridges   5\" x20 5\" 2x10 5\" 2x10           Ther Ex                Doorway pec stretch 30\"x4 30\"x4  30\"x4 30\" x4 ea   HS stretch 6\" step  30\"x3 ea 6\" step  30\"x3 ea 6\" step  30\"x3 ea Step stool  30\" x3 ea On Step   30\" x4   Mini squats 2\" 2x10 2\" 2x10 2\" 2x10 2x10 2x10   Step ups 6\" step  X20 ea 6\" step  X20 ea 6\" x20 ea 6\" x20 ea 6\" x20 ea                           Ther Activity                                                Modalities                                       "

## 2024-08-26 ENCOUNTER — OFFICE VISIT (OUTPATIENT)
Dept: PHYSICAL THERAPY | Facility: CLINIC | Age: 74
End: 2024-08-26
Payer: MEDICARE

## 2024-08-26 DIAGNOSIS — S22.060D CLOSED WEDGE COMPRESSION FRACTURE OF T7 VERTEBRA WITH ROUTINE HEALING, SUBSEQUENT ENCOUNTER: ICD-10-CM

## 2024-08-26 DIAGNOSIS — M54.9 MID BACK PAIN: Primary | ICD-10-CM

## 2024-08-26 PROCEDURE — 97112 NEUROMUSCULAR REEDUCATION: CPT | Performed by: PHYSICAL THERAPIST

## 2024-08-26 PROCEDURE — 97110 THERAPEUTIC EXERCISES: CPT | Performed by: PHYSICAL THERAPIST

## 2024-08-26 NOTE — PROGRESS NOTES
"Daily Note     Today's date: 2024  Patient name: Elvira Reed  : 1950  MRN: [id number]  Referring provider: Jossie Park PA-C  Dx:   Encounter Diagnosis     ICD-10-CM    1. Mid back pain  M54.9       2. Closed wedge compression fracture of T7 vertebra with routine healing, subsequent encounter  S22.635E                      Subjective: Patient reports she is feeling good today.       Objective: See treatment diary below      Assessment: Tolerated treatment well. Making steady progress toward her goals. Tolerated progressions well. Patient demonstrated fatigue post treatment, exhibited good technique with therapeutic exercises, and would benefit from continued PT      Plan: Continue per plan of care.  Progress treatment as tolerated.       Precautions: osteoporosis, T7 wedge compression fracture  POC exp 10/21/24  HEP Access Code: [email or web address] - updated 24    Manuals 8/15 8/19 8/22 8/26 8/12                                   Neuro Re-Ed        UBE retro for posture 120 rpm  X8 mins 120 rpm x8 min 120 rpm  8 mins 100 rpm  8 mins 120  rpm x8 min   Slouch overcorrect in front of mirror With TB resistance     Green TB    5\" x20 With TB resistance     Green TB    5\" x20 With TB resistance     Green TB    5\" x20  Seated  5\" 2x10   TB rows GTB  5\" x20 GTB  5\" x20 Blue TB  5\" x20 Blue TB  5\" x20 GTB  5\" x20   TB pull downs GTB  5\" x20 GTB  5\" x20 Blue TB  5\" x20 Blue TB  5\" x20 GTB   TB b/l ER GTB  5\" x20 GTB 5\"x20 Blue   5\" X20  Blue TB  5\" x20 GTB  5\" x20   TB anti-trunk rotation GTB 5\" x20 GTB 5\" x20 Blue TB  5\" 2x10 ea Blue TB  5\" 2x10 GTB  5\" 2x10 ea   TB horizontal abduction    Red TB  2\" 2x10    Chin tucks Seated  5\" x20 Seated  5\" x20 Seated 5\"x20 seated  5\" x20 Supine 5\" 2x10   Supine serratus press   2# 5\" x20 2# 5\" x20 2# 5\" 2x10   Bridges   5\" x20 5\" x20s 5\" 2x10           Ther Ex                Doorway pec stretch 30\"x4 30\"x4   30\" x4 ea   HS stretch 6\" step  30\"x3 ea 6\" step  30\"x3 ea " "6\" step  30\"x3 ea  On Step   30\" x4   Mini squats 2\" 2x10 2\" 2x10 2\" 2x10 2\" 2x10 2x10   Step ups 6\" step  X20 ea 6\" step  X20 ea 6\" x20 ea 6\" x20 ea 6\" x20 ea   Standing arm raises - flexion    1# 2x10    Standing arm raises - scaption    1# 2x10    Wall push ups    Mini   1x10            Ther Activity                                                Modalities                                         "

## 2024-08-29 ENCOUNTER — EVALUATION (OUTPATIENT)
Dept: PHYSICAL THERAPY | Facility: CLINIC | Age: 74
End: 2024-08-29
Payer: MEDICARE

## 2024-08-29 DIAGNOSIS — S22.060D CLOSED WEDGE COMPRESSION FRACTURE OF T7 VERTEBRA WITH ROUTINE HEALING, SUBSEQUENT ENCOUNTER: ICD-10-CM

## 2024-08-29 DIAGNOSIS — M54.9 MID BACK PAIN: Primary | ICD-10-CM

## 2024-08-29 PROCEDURE — 97112 NEUROMUSCULAR REEDUCATION: CPT | Performed by: PHYSICAL THERAPIST

## 2024-08-29 PROCEDURE — 97530 THERAPEUTIC ACTIVITIES: CPT | Performed by: PHYSICAL THERAPIST

## 2024-08-29 PROCEDURE — 97110 THERAPEUTIC EXERCISES: CPT | Performed by: PHYSICAL THERAPIST

## 2024-08-29 NOTE — PROGRESS NOTES
PT Re-Evaluation     Today's date: 2024  Patient name: Elvira Reed  : 1950  MRN: [id number]  Referring provider: Jossie Park*  Dx:   Encounter Diagnosis     ICD-10-CM    1. Mid back pain  M54.9       2. Closed wedge compression fracture of T7 vertebra with routine healing, subsequent encounter  S22.060D                      Assessment  Impairments: abnormal or restricted ROM, abnormal movement, activity intolerance, impaired physical strength, pain with function, participation limitations and activity limitations  Functional limitations: limited in lifting, bending, pushing and pulling; mildly limited in seated endurance (has days with pain while sitting at work/desk)    Assessment details: Elvira has been compliant with PT services. She has attended 10 visits of OPPT for c/o acute back pain and diagnosis of closed wedge compression fracture of T7 vertebra. She has demonstrated decreased pain, increased strength, increased range of motion, and increased activity tolerance since starting physical therapy services. She reports an overall improvement of 90% thus far.  She continues to present with mild pain, decreased strength, decreased range of motion, and decreased activity tolerance and would benefit from additional skilled physical therapy interventions to address impairments and maximize function. Planning on progressing patient to more functional activity (prepare for return to vacuuming and other heavy household chores)  for the next 2 weeks, then DC to [facility].   Understanding of Dx/Px/POC: good     Prognosis: good    Goals  STGs  1) IN 4 weeks patient will report pain levels decreased 3 points or more - MET  2) In 4 weeks patient will demonstrate 10 deg improvement in HS flexibility b/l  3) IN 4 weeks patient will demonstrate 1/2 grade improvement in MMT of shoulder flexion b/l and pain free during MMT    LTGs  1) In 6-12 weeks patient will  report little to no difficulty with  completing household chores including vacuuming  2) In 6-12 weeks patient will report little to no difficulty with carrying laundry basket up/down stairs  3) In 6-12 weeks patient will demonstrate independence with HEP and readiness to join fitness center.    Plan  Patient would benefit from: skilled physical therapy  Referral necessary: No  Planned modality interventions: cryotherapy and thermotherapy: hydrocollator packs    Planned therapy interventions: IASTM, joint mobilization, kinesiology taping, manual therapy, massage, Timmons taping, abdominal trunk stabilization, balance, balance/weight bearing training, neuromuscular re-education, postural training, self care, strengthening, stretching, therapeutic activities, therapeutic exercise, flexibility, functional ROM exercises and home exercise program    Frequency: 2x week  Plan of Care beginning date: 8/29/2024  Plan of Care expiration date: 10/21/2024  Treatment plan discussed with: patient      Subjective Evaluation    History of Present Illness  Mechanism of injury: -presents to PT with c/o mid-back pain  -onset of pain in June, no specific YOVANY  -was seen in [facility] and referred to spine and pain, later to Dr. Farias  -imaging as described below:    RADIOGRAPHIC STUDIES:  1. Xrays, thoracic spine, 6/11/2024: Multilevel degenerative changes with mild increased kyphosis.  There is evidence of mild wedge deformity involving the T7 vertebral body.  The upper part of the thoracic spine could not be fully visualized.  2. MRI, thoracic spine, 7/02/2024: Acute compression deformity at T7.  Chronic deep deformity involving the T4 vertebral body.  There is evidence of edema at T7 consistent with acute injury.  3. Xrays, thoracic spine, 7/24/2024: Multilevel degenerative disc with increased kyphosis and mild scoliosis.  The fracture seen on prior x-rays slightly more wedge compared to the x-rays from 6/11/2024.    -now only having pain with prolonged  sitting  -works as a , bought herself a new seat cushion and back support for her office chair  -generally pain has been improving  -she has been trying not to take anything for the pain, can go a few days without any Aleve  -she has been very careful since being diagnosed with the wedge fracture  -she is limiting her lifting/carrying, asking son for help with carrying laundry basket up/down to basement  -she has been getting help with her house cleaning, hired someone for the time being  -she has not been able to enjoy her gardening due to this problem  -pain is primarily along the bra line, across both sides of her back  -no problems sleeping  -next fu with Dr. Schaffer is scheduled for 10/1  -Dr. Coates has been taking Fosomax for 3-4 years but will be getting scheduled for an infusion    Re-evaluation 24:  -patient reports she is doing much better, rates her progress 90%  -she has more good days than bad days  -she usually only gets pain with sitting at her desk at work, but there are days where she does not have pain  -she takes aleve on occasion for the pain  -she has started to lift and carry her laundry basket  -still has not attempted vacuuming on her own and still has someone cleaning her house  Patient Goals  Patient goals for therapy: decreased pain and independence with ADLs/IADLs  Patient goal: begin routine exercise; be able to walk through [place] in October (10 day trip)  Pain  Current pain ratin  At best pain ratin  At worst pain ratin    Social Support  Stairs in house: yes (basement stairs)   Lives with: alone    Employment status: working  Treatments  Current treatment: physical therapy      Objective     Static Posture     Head  Forward.    Thoracic Spine  Hyperkyphosis.    Postural Observations  Seated posture: poor  Correction of posture: makes symptoms better      Tenderness     Additional Tenderness Details  (+) TTP along T7 SP and b/l Tps - PERSISTS  (+)  "TTP along rhomboids b/l  - PERSISTS    Strength/Myotome Testing     Left Shoulder     Planes of Motion   Flexion: 4   Abduction: 4+   External rotation at 0°: 4+   Internal rotation at 0°: 4+     Right Shoulder     Planes of Motion   Flexion: 4   Abduction: 4+   External rotation at 0°: 4+   Internal rotation at 0°: 4+     Left Elbow   Flexion: 4+  Extension: 4+    Right Elbow   Flexion: 4+  Extension: 4+    Lumbar   Left   Normal strength    Right   Normal strength    Additional Strength Details  Pain reproduced with MMT flexion- RESOLVED 8/29/24    Tests     Left Hip   90/90 SLR: Negative.     Right Hip   90/90 SLR: Negative.    Additional Tests Details  HS 90-90: Lacks 35 deg active knee ext b/l  Re-eval 8/29/24: lacks 20 deg active knee ext b/l             Precautions: osteoporosis, T7 wedge compression fracture  POC exp 10/21/24  HEP Access Code: [email or web address] - updated 7/29/24  Manuals 8/15 8/19 8/22 8/26 8/29                                   Neuro Re-Ed        UBE retro for posture 120 rpm  X8 mins 120 rpm x8 min 120 rpm  8 mins 100 rpm  8 mins 100 rpm  8 mins   Slouch overcorrect in front of mirror With TB resistance     Green TB    5\" x20 With TB resistance     Green TB    5\" x20 With TB resistance     Green TB    5\" x20     TB rows GTB  5\" x20 GTB  5\" x20 Blue TB  5\" x20 Blue TB  5\" x20 Blue TB  5\" x20   TB pull downs GTB  5\" x20 GTB  5\" x20 Blue TB  5\" x20 Blue TB  5\" x20 Blue TB  5\" x20   TB b/l ER GTB  5\" x20 GTB 5\"x20 Blue   5\" X20  Blue TB  5\" x20 Blue TB  5\" x20   TB anti-trunk rotation GTB 5\" x20 GTB 5\" x20 Blue TB  5\" 2x10 ea Blue TB  5\" 2x10 Blue TB  5\" 2x10   TB horizontal abduction    Red TB  2\" 2x10 Red TB  2\" 2x10   Chin tucks Seated  5\" x20 Seated  5\" x20 Seated 5\"x20 seated  5\" x20    Supine serratus press   2# 5\" x20 2# 5\" x20 3# 5\" x20   Bridges   5\" x20 5\" x20s 5\" x20           Ther Ex                Doorway pec stretch 30\"x4 30\"x4      HS stretch 6\" step  30\"x3 ea 6\" step  30\"x3 ea 6\" " "step  30\"x3 ea     Mini squats 2\" 2x10 2\" 2x10 2\" 2x10 2\" 2x10 2\" 2x10   Step ups 6\" step  X20 ea 6\" step  X20 ea 6\" x20 ea 6\" x20 ea 6\" x20 ea   Standing arm raises - flexion    1# 2x10 1# 2x10   Standing arm raises - scaption    1# 2x10 1# 2x10   Wall push ups    Mini   1x10 Mini   1x10           Ther Activity        Push-pull cart     10#  5 steps back/fwd  x5   Medicine ball lift from waist to overhead     6#  x10   Weighted crate lift and carry up steps     10# in crate, rested on hip, 1 handrail    4 steps, 3x ea                   Modalities                             Access Code: [email or web address]  URL: [email or web address]  Date: 07/29/2024  Prepared by: Carmina Olson    Exercises  - Slouch Overcorrect on Swiss Ball  - 3 x daily - 7 x weekly - 10 reps - 5 sec hold  - Doorway Pec Stretch at 60 Degrees Abduction with Arm Straight  - 3 x daily - 7 x weekly - 4 reps - 30 sec hold  - Standing Hamstring Stretch with Step  - 3 x daily - 7 x weekly - 4 reps - 30 sec hold    "

## 2024-09-03 ENCOUNTER — OFFICE VISIT (OUTPATIENT)
Dept: PHYSICAL THERAPY | Facility: CLINIC | Age: 74
End: 2024-09-03
Payer: MEDICARE

## 2024-09-03 DIAGNOSIS — S22.060D CLOSED WEDGE COMPRESSION FRACTURE OF T7 VERTEBRA WITH ROUTINE HEALING, SUBSEQUENT ENCOUNTER: ICD-10-CM

## 2024-09-03 DIAGNOSIS — M54.9 MID BACK PAIN: Primary | ICD-10-CM

## 2024-09-03 PROCEDURE — 97530 THERAPEUTIC ACTIVITIES: CPT

## 2024-09-03 PROCEDURE — 97112 NEUROMUSCULAR REEDUCATION: CPT

## 2024-09-03 NOTE — PROGRESS NOTES
"Daily Note     Today's date: 9/3/2024  Patient name: Elvira Reed  : 1950  MRN: [id number]  Referring provider: Jossie Park*  Dx:   Encounter Diagnosis     ICD-10-CM    1. Mid back pain  M54.9       2. Closed wedge compression fracture of T7 vertebra with routine healing, subsequent encounter  S22.719D                      Subjective: Pt has not had pain in 3 days. She is pleased with her progress this far.       Objective: See treatment diary below      Assessment: Tolerated treatment well. Patient demonstrated fatigue post treatment, exhibited good technique with therapeutic exercises, and would benefit from continued PT      Plan: Continue per plan of care.  Progress treatment as tolerated.       Precautions: osteoporosis, T7 wedge compression fracture  POC exp 10/21/24  HEP Access Code: [email or web address] - updated 24  Manuals 9/3 8/19 8/22 8/26 8/29                                   Neuro Re-Ed        UBE retro for posture 100 rpm  X8 mins 120 rpm x8 min 120 rpm  8 mins 100 rpm  8 mins 100 rpm  8 mins   Slouch overcorrect in front of mirror  With TB resistance     Green TB    5\" x20 With TB resistance     Green TB    5\" x20     TB rows BTB  5\" x20 GTB  5\" x20 Blue TB  5\" x20 Blue TB  5\" x20 Blue TB  5\" x20   TB pull downs BTB  5\" x20 GTB  5\" x20 Blue TB  5\" x20 Blue TB  5\" x20 Blue TB  5\" x20   TB b/l ER BTB  5\" x20 GTB 5\"x20 Blue   5\" X20  Blue TB  5\" x20 Blue TB  5\" x20   TB anti-trunk rotation BTB 5\" x20 ea GTB 5\" x20 Blue TB  5\" 2x10 ea Blue TB  5\" 2x10 Blue TB  5\" 2x10   TB horizontal abduction Red TB  2\" 2x10   Red TB  2\" 2x10 Red TB  2\" 2x10   Chin tucks  Seated  5\" x20 Seated 5\"x20 seated  5\" x20    Supine serratus press 3# 5\" x20  2# 5\" x20 2# 5\" x20 3# 5\" x20   Bridges 5\" x20  5\" x20 5\" x20s 5\" x20           Ther Ex                Doorway pec stretch  30\"x4      HS stretch  6\" step  30\"x3 ea 6\" step  30\"x3 ea     Mini squats 2\" 2x10 2\" 2x10 2\" 2x10 2\" 2x10 2\" 2x10   Step ups 6\" " "step  X20 ea 6\" step  X20 ea 6\" x20 ea 6\" x20 ea 6\" x20 ea   Standing arm raises - flexion 1# 2x10   1# 2x10 1# 2x10   Standing arm raises - scaption 1# 2x10   1# 2x10 1# 2x10   Wall push ups Mini   1x10   Mini   1x10 Mini   1x10           Ther Activity        Push-pull cart 20#  5 steps back/fwd  x6    10#  5 steps back/fwd  x5   Medicine ball lift from waist to overhead 6# 2x10    6#  x10   Weighted crate lift and carry up steps 10# in crate, rested on hip, 1 handrail    4 steps, 3x ea    10# in crate, rested on hip, 1 handrail    4 steps, 3x ea                   Modalities                               "

## 2024-09-09 ENCOUNTER — APPOINTMENT (OUTPATIENT)
Dept: PHYSICAL THERAPY | Facility: CLINIC | Age: 74
End: 2024-09-09
Payer: MEDICARE

## 2024-09-11 ENCOUNTER — OFFICE VISIT (OUTPATIENT)
Dept: PHYSICAL THERAPY | Facility: CLINIC | Age: 74
End: 2024-09-11
Payer: MEDICARE

## 2024-09-11 DIAGNOSIS — S22.060D CLOSED WEDGE COMPRESSION FRACTURE OF T7 VERTEBRA WITH ROUTINE HEALING, SUBSEQUENT ENCOUNTER: ICD-10-CM

## 2024-09-11 DIAGNOSIS — M54.9 MID BACK PAIN: Primary | ICD-10-CM

## 2024-09-11 PROCEDURE — 97112 NEUROMUSCULAR REEDUCATION: CPT

## 2024-09-11 PROCEDURE — 97110 THERAPEUTIC EXERCISES: CPT

## 2024-09-11 NOTE — PROGRESS NOTES
"Daily Note     Today's date: 2024  Patient name: Elvira Reed  : 1950  MRN: [id number]  Referring provider: Jossie Park*  Dx:   Encounter Diagnosis     ICD-10-CM    1. Mid back pain  M54.9       2. Closed wedge compression fracture of T7 vertebra with routine healing, subsequent encounter  S22.060D                      Subjective:  Elvira reports that she is feeling good.  She notes tolerating all the exercise progressions well. Patient reports that she gets sore on occasion for no real reason at times.      Objective: See treatment diary below      Assessment: Patient tolerated treatment well. Patient performed ex as noted with direct supervision.  Patient demonstrated a good understanding of the exercises performed- provided minimal cues as needed.    Patient reported feeling good post treatment.  Patient would benefit from continued PT intervention to address deficits and attain set goals.       Plan: Continue per plan of care.      Precautions: osteoporosis, T7 wedge compression fracture  POC exp 10/21/24  HEP Access Code: [email or web address] - updated 24  Manuals 9/3 9/11  8/26 8/29                                   Neuro Re-Ed        UBE retro for posture 100 rpm  X8 mins 100 rpm   x8 min  100 rpm  8 mins 100 rpm  8 mins   Slouch overcorrect in front of mirror        TB rows BTB  5\" x20 BTB  5\" x20   Blue TB  5\" x20 Blue TB  5\" x20 Blue TB  5\" x20   TB pull downs BTB  5\" x20 BTB  5\" x20 Blue TB  5\" x20 Blue TB  5\" x20 Blue TB  5\" x20   TB b/l ER BTB  5\" x20 BTB  5\"x20 Blue   5\" X20  Blue TB  5\" x20 Blue TB  5\" x20   TB anti-trunk rotation BTB 5\" x20 ea BTB  5\"x20 ea Blue TB  5\" 2x10 ea Blue TB  5\" 2x10 Blue TB  5\" 2x10   TB horizontal abduction Red TB  2\" 2x10 Red tb  2\"  2x10  Red TB  2\" 2x10 Red TB  2\" 2x10   Chin tucks   Seated 5\"x20 seated  5\" x20    Supine serratus press 3# 5\" x20 3# 5\"x20 2# 5\" x20 2# 5\" x20 3# 5\" x20   Bridges 5\" x20 5\" x20 5\" x20 5\" x20s 5\" x20           Ther " "Ex                Doorway pec stretch        HS stretch   6\" step  30\"x3 ea     Mini squats 2\" 2x10 2\" 2x10 2\" 2x10 2\" 2x10 2\" 2x10   Step ups 6\" step  X20 ea 6\" step  X20 ea 6\" x20 ea 6\" x20 ea 6\" x20 ea   Standing arm raises - flexion 1# 2x10 1# 2x10  1# 2x10 1# 2x10   Standing arm raises - scaption 1# 2x10 1# 2x10  1# 2x10 1# 2x10   Wall push ups Mini   1x10 Mini  1x10  Mini   1x10 Mini   1x10           Ther Activity        Push-pull cart 20#  5 steps back/fwd  x6 20#   5 steps  Back/fwd  x6   10#  5 steps back/fwd  x5   Medicine ball lift from waist to overhead 6# 2x10 6# 2x10   6#  x10   Weighted crate lift and carry up steps 10# in crate, rested on hip, 1 handrail    4 steps, 3x ea 10# in crate,   Rested on hip , 1 hand rail    4 steps,3x   10# in crate, rested on hip, 1 handrail    4 steps, 3x ea                   Modalities                                 "

## 2024-09-12 ENCOUNTER — APPOINTMENT (OUTPATIENT)
Dept: PHYSICAL THERAPY | Facility: CLINIC | Age: 74
End: 2024-09-12
Payer: MEDICARE

## 2024-09-17 ENCOUNTER — OFFICE VISIT (OUTPATIENT)
Dept: PHYSICAL THERAPY | Facility: CLINIC | Age: 74
End: 2024-09-17
Payer: MEDICARE

## 2024-09-17 DIAGNOSIS — S22.060D CLOSED WEDGE COMPRESSION FRACTURE OF T7 VERTEBRA WITH ROUTINE HEALING, SUBSEQUENT ENCOUNTER: ICD-10-CM

## 2024-09-17 DIAGNOSIS — M54.9 MID BACK PAIN: Primary | ICD-10-CM

## 2024-09-17 PROCEDURE — 97530 THERAPEUTIC ACTIVITIES: CPT

## 2024-09-17 PROCEDURE — 97112 NEUROMUSCULAR REEDUCATION: CPT

## 2024-09-17 PROCEDURE — 97110 THERAPEUTIC EXERCISES: CPT

## 2024-09-17 NOTE — PROGRESS NOTES
"Daily Note     Today's date: 2024  Patient name: Elvira Reed  : 1950  MRN: [id number]  Referring provider: Jossie Park*  Dx:   Encounter Diagnosis     ICD-10-CM    1. Mid back pain  M54.9       2. Closed wedge compression fracture of T7 vertebra with routine healing, subsequent encounter  S22.156D                      Subjective: Patient noted no new complaints.        Objective: See treatment diary below      Assessment: Tolerated treatment fair. Patient exhibited good technique with therapeutic exercises and would benefit from continued PT. Patient performed lifting create and walking up and down stairs with correct body mechanics.       Plan: Continue per plan of care.      Precautions: osteoporosis, T7 wedge compression fracture  POC exp 10/21/24  HEP Access Code: [email or web address] - updated 24  Manuals 9/3 9/11 9/17  8/29                                   Neuro Re-Ed        UBE retro for posture 100 rpm  X8 mins 100 rpm   x8 min 100 rpm x 8 min  100 rpm  8 mins   Slouch overcorrect in front of mirror        TB rows BTB  5\" x20 BTB  5\" x20   BTB 5\" x 20  Blue TB  5\" x20   TB pull downs BTB  5\" x20 BTB  5\" x20 BTB 5\" x 20  Blue TB  5\" x20   TB b/l ER BTB  5\" x20 BTB  5\"x20 BTB 5\" x 20  Blue TB  5\" x20   TB anti-trunk rotation BTB 5\" x20 ea BTB  5\"x20 ea BTB 5\" x 20  Blue TB  5\" 2x10   TB horizontal abduction Red TB  2\" 2x10 Red tb  2\"  2x10 Red TB 2'' 2 x 10  Red TB  2\" 2x10   Chin tucks        Supine serratus press 3# 5\" x20 3# 5\"x20 3# 5\" x 20  3# 5\" x20   Bridges 5\" x20 5\" x20 5\" x 20   5\" x20           Ther Ex                Doorway pec stretch        HS stretch        Mini squats 2\" 2x10 2\" 2x10 2\" 2 x 10  2\" 2x10   Step ups 6\" step  X20 ea 6\" step  X20 ea 6\" step x 20 ea  6\" x20 ea   Standing arm raises - flexion 1# 2x10 1# 2x10 1# 2 x 10  1# 2x10   Standing arm raises - scaption 1# 2x10 1# 2x10 1# 2 x 10  1# 2x10   Wall push ups Mini   1x10 Mini  1x10 Mini x 10  Mini   1x10       "     Ther Activity        Push-pull cart 20#  5 steps back/fwd  x6 20#   5 steps  Back/fwd  x6 20#   5 steps  Back/fwd  x6  10#  5 steps back/fwd  x5   Medicine ball lift from waist to overhead 6# 2x10 6# 2x10 6# 2 x 10  6#  x10   Weighted crate lift and carry up steps 10# in crate, rested on hip, 1 handrail    4 steps, 3x ea 10# in crate,   Rested on hip , 1 hand rail    4 steps,3x 10# in crate,   Rested on hip , 1 hand rail    4 steps,3x  10# in crate, rested on hip, 1 handrail    4 steps, 3x ea                   Modalities

## 2024-09-19 ENCOUNTER — OFFICE VISIT (OUTPATIENT)
Dept: PHYSICAL THERAPY | Facility: CLINIC | Age: 74
End: 2024-09-19
Payer: MEDICARE

## 2024-09-19 DIAGNOSIS — M54.9 MID BACK PAIN: Primary | ICD-10-CM

## 2024-09-19 DIAGNOSIS — S22.060D CLOSED WEDGE COMPRESSION FRACTURE OF T7 VERTEBRA WITH ROUTINE HEALING, SUBSEQUENT ENCOUNTER: ICD-10-CM

## 2024-09-19 PROCEDURE — 97110 THERAPEUTIC EXERCISES: CPT

## 2024-09-19 PROCEDURE — 97112 NEUROMUSCULAR REEDUCATION: CPT

## 2024-09-19 NOTE — PROGRESS NOTES
"Daily Note     Today's date: 2024  Patient name: Elvira Reed  : 1950  MRN: [id number]  Referring provider: Jossie Park*  Dx:   Encounter Diagnosis     ICD-10-CM    1. Mid back pain  M54.9       2. Closed wedge compression fracture of T7 vertebra with routine healing, subsequent encounter  S22.758D                      Subjective: Patient noted no new complaints.       Objective: See treatment diary below      Assessment: Tolerated treatment well. Patient exhibited good technique with therapeutic exercises.       Plan:  Patient D/C to [facility] as per primary therapist.      Precautions: osteoporosis, T7 wedge compression fracture  POC exp 10/21/24  HEP Access Code: [email or web address] - updated 24  Manuals 9/3 9/11 9/17 9/19                                    Neuro Re-Ed        UBE retro for posture 100 rpm  X8 mins 100 rpm   x8 min 100 rpm x 8 min 100rpm 8 min    Slouch overcorrect in front of mirror        TB rows BTB  5\" x20 BTB  5\" x20   BTB 5\" x 20 5\" x 20 BTB    TB pull downs BTB  5\" x20 BTB  5\" x20 BTB 5\" x 20 BTB 5\" x 20    TB b/l ER BTB  5\" x20 BTB  5\"x20 BTB 5\" x 20 BTB 5\" x 20    TB anti-trunk rotation BTB 5\" x20 ea BTB  5\"x20 ea BTB 5\" x 20 BTB 5\" x 20    TB horizontal abduction Red TB  2\" 2x10 Red tb  2\"  2x10 Red TB 2'' 2 x 10 RTB 2\" 2 x 10    Chin tucks        Supine serratus press 3# 5\" x20 3# 5\"x20 3# 5\" x 20 3# 5\" x 20    Bridges 5\" x20 5\" x20 5\" x 20  5\" x 20            Ther Ex                Doorway pec stretch        HS stretch        Mini squats 2\" 2x10 2\" 2x10 2\" 2 x 10 2\" 2 x 10     Step ups 6\" step  X20 ea 6\" step  X20 ea 6\" step x 20 ea 6\" x 20     Standing arm raises - flexion 1# 2x10 1# 2x10 1# 2 x 10 #1 2 x 10    Standing arm raises - scaption 1# 2x10 1# 2x10 1# 2 x 10 #1 2 x 10    Wall push ups Mini   1x10 Mini  1x10 Mini x 10 Mini x 10            Ther Activity        Push-pull cart 20#  5 steps back/fwd  x6 20#   5 steps  Back/fwd  x6 20#   5 steps  Back/fwd  x6 20# "   5 steps  Back/fwd  x6    Medicine ball lift from waist to overhead 6# 2x10 6# 2x10 6# 2 x 10 6# 2 x 10    Weighted crate lift and carry up steps 10# in crate, rested on hip, 1 handrail    4 steps, 3x ea 10# in crate,   Rested on hip , 1 hand rail    4 steps,3x 10# in crate,   Rested on hip , 1 hand rail    4 steps,3x 10# in crate rested on hip, 1 hand rail    4 steps 3x                    Modalities

## 2024-10-01 ENCOUNTER — OFFICE VISIT (OUTPATIENT)
Age: 74
End: 2024-10-01
Payer: MEDICARE

## 2024-10-01 VITALS
BODY MASS INDEX: 21.48 KG/M2 | HEIGHT: 63 IN | SYSTOLIC BLOOD PRESSURE: 142 MMHG | WEIGHT: 121.2 LBS | DIASTOLIC BLOOD PRESSURE: 76 MMHG

## 2024-10-01 DIAGNOSIS — S22.060D CLOSED WEDGE COMPRESSION FRACTURE OF T7 VERTEBRA WITH ROUTINE HEALING, SUBSEQUENT ENCOUNTER: Primary | ICD-10-CM

## 2024-10-01 PROCEDURE — 99212 OFFICE O/P EST SF 10 MIN: CPT | Performed by: ORTHOPAEDIC SURGERY

## 2024-10-01 NOTE — PROGRESS NOTES
"[facility]  DR.AMIR CHARLOTTE MD  [address] [facility] [address] [phone]    HISTORY OF PRESENT ILLNESS:    Elvira Reed is a 74 y.o. female who presents for follow-up evaluation. Patient was last seen in the office on 7/24/2024 where patient was advised to continue with physical therapy and referral to endocrinology was placed for management of osteoporosis. Patient denies any significant pain at this time. Patient has attended physical therapy as directed.       Diabetic: No  Nicotine use: Yes   BMI: Estimated body mass index is 21.47 kg/m² as calculated from the following:    Height as of this encounter: 5' 3\" (1.6 m).    Weight as of this encounter: 55 kg (121 lb 3.2 oz).  Blood thinners: Aspirin    ALLERGIES: No Known Allergies    MEDICATIONS:    Current Outpatient Medications:     Ascorbic Acid (Vitamin C) 125 MG CHEW, Chew 1 tablet daily, Disp: , Rfl:     aspirin 81 mg chewable tablet, Chew 81 mg daily, Disp: , Rfl:     B Complex Vitamins (VITAMIN B COMPLEX 100 IJ), , Disp: , Rfl:     Calcium Carbonate-Vit D-Min (CALTRATE 600+D PLUS MINERALS PO), Take 1 tablet by mouth daily, Disp: , Rfl:     Cetirizine HCl (ZYRTEC ALLERGY PO), Take 1 tablet by mouth as needed  , Disp: , Rfl:     Cranberry 1000 MG CAPS, Take by mouth, Disp: , Rfl:     cyanocobalamin (VITAMIN B-12) 100 mcg tablet, Take by mouth daily, Disp: , Rfl:     fluticasone-umeclidinium-vilanterol (Trelegy Ellipta) 200-62.5-25 mcg/actuation AEPB inhaler, Inhale 1 puff daily, Disp: 60 blister, Rfl: 5    Multiple Vitamin (MULTI-VITAMIN DAILY) TABS, Take by mouth daily, Disp: , Rfl:     Multiple Vitamins-Minerals (HAIR SKIN NAILS PO), Take by mouth, Disp: , Rfl:     VITAMIN D PO, Take 1 tablet by mouth every other day, Disp: , Rfl:     Zinc Sulfate (ZINC-220 PO), Take 1 tablet by mouth daily, Disp: , Rfl:      PAST MEDICAL HISTORY:   Past Medical History:   Diagnosis Date    Amaurosis fugax 02/18/2021    Basal " cell carcinoma of skin 02/03/2020    Cancer (HCC)     skin    Clotting disorder (HCC)     + cardiolipins antibodies    COPD (chronic obstructive pulmonary disease) (HCC)     Hypertension October 2020    low 85 & [id number]    Osteoporosis     Tracheobronchitis 10/04/2021    Transient ischemic attack 10/29/2020    Vision disturbance 02/09/2021    Vision loss, bilateral 02/09/2021       PAST SURGICAL HISTORY:  Past Surgical History:   Procedure Laterality Date    HEMORRHOID SURGERY      MA CMBND ANTERPOST COLPORRAPHY W/CYSTO N/A 4/24/2023    Procedure: A&P COLPORRHAPHY;  Surgeon: Daryn Mccall MD;  Location: [facility];  Service: UroGynecology           MA COLPOPEXY VAGINAL EXTRAPERITONEAL APPROACH N/A 4/24/2023    Procedure: COLPOSUSPENSION VAGINAL EXTRAPERITONEAL(ENPLACE);  Surgeon: Daryn Mccall MD;  Location[facility];  Service: UroGynecology           MA CYSTOURETHROSCOPY N/A 4/24/2023    Procedure: CYSTO;  Surgeon: Daryn Mccall MD;  Location[facility];  Service: UroGynecology           MA SLING OPERATION STRESS INCONTINENCE N/A 4/24/2023    Procedure: PV SLING (RITESH);  Surgeon: Daryn Mccall MD;  Location: [facility];  Service: UroGynecology              SOCIAL HISTORY:  Social History     Tobacco Use   Smoking Status Light Smoker    Current packs/day: 0.25    Average packs/day: 0.3 packs/day for 60.0 years (15.0 ttl pk-yrs)    Types: Cigarettes   Smokeless Tobacco Never   Tobacco Comments    ready to quit at times          PHYSICAL EXAM:  74 y.o. female sitting comfortably on exam chair in no apparent distress.   Ambulates with normal gait  Able to go up on toes and heels  No significant TTP over thoracic or lumbar spine      RADIOGRAPHIC STUDIES:  Xrays, thoracic spine, 6/11/2024: Multilevel degenerative changes with mild increased kyphosis.  There is evidence of mild wedge deformity involving the T7 vertebral body.  The upper part of the thoracic spine could not be fully visualized.  MRI,  thoracic spine, 7/02/2024: Acute compression deformity at T7.  Chronic deep deformity involving the T4 vertebral body.  There is evidence of edema at T7 consistent with acute injury.  Xrays, thoracic spine, 7/24/2024: Multilevel degenerative disc with increased kyphosis and mild scoliosis.  The fracture seen on prior x-rays slightly more wedge compared to the x-rays from 6/11/2024.      ASSESSMENT:  1. Closed wedge compression fracture of T7 vertebra with routine healing, subsequent encounter      PLAN:  74 y.o. female with acute T7 fracture, sustained on 6/11/2024.     Patient is doing well at this time. Patient was advised that fracture is well healed at this time. Patient may continue with physical therapy as long as it is helpful. Patient should see endocrinology as scheduled for medical management of osteoporosis.     Patient may follow-up as needed.        Scribe Attestation      I,:  Jossie Park PA-C am acting as a scribe while in the presence of the attending physician.:       I,:  Ben Glynn MD personally performed the services described in this documentation    as scribed in my presence.:

## 2024-10-04 ENCOUNTER — CONSULT (OUTPATIENT)
Dept: ENDOCRINOLOGY | Facility: CLINIC | Age: 74
End: 2024-10-04
Payer: MEDICARE

## 2024-10-04 VITALS
OXYGEN SATURATION: 98 % | BODY MASS INDEX: 21.37 KG/M2 | SYSTOLIC BLOOD PRESSURE: 130 MMHG | HEIGHT: 63 IN | TEMPERATURE: 98 F | WEIGHT: 120.6 LBS | DIASTOLIC BLOOD PRESSURE: 70 MMHG | HEART RATE: 68 BPM

## 2024-10-04 DIAGNOSIS — M80.00XA OSTEOPOROSIS WITH CURRENT PATHOLOGICAL FRACTURE, UNSPECIFIED OSTEOPOROSIS TYPE, INITIAL ENCOUNTER: Primary | ICD-10-CM

## 2024-10-04 PROCEDURE — 99204 OFFICE O/P NEW MOD 45 MIN: CPT | Performed by: STUDENT IN AN ORGANIZED HEALTH CARE EDUCATION/TRAINING PROGRAM

## 2024-10-04 NOTE — PROGRESS NOTES
Ambulatory Visit  Name: Elvira Reed      : 1950      MRN: [id number]  Encounter Provider: Eunice Moran MD  Encounter Date: 10/4/2024   Encounter department: [facility] [place]    Assessment & Plan  Osteoporosis with current pathological fracture, unspecified osteoporosis type, initial encounter    Elvira has severe osteoporosis with very low T-scores and history of compression fractures in thoracic spine.  She tried Fosamax for 4 to 5 years and received first dose of Reclast injection recently.  DEXA scan showed improvement in her bone density scan.  We did reviewed pathophysiology of osteoporosis, treatment options, adverse reactions of which medications.  We did emphasize importance of proper treatment to avoid further fractures.  I believe she would greatly benefit from osteoanabolic agents given severity of osteoporosis.  However, we need to rule out secondary causes for which I ordered PTH, calcium, celiac panel, multiple myeloma panel, and I checked bone turnover markers.  I may repeat bone density scan in 1 year.  Transitioning her to anabolic agent pending above results.  Fall safety precaution reviewed.  Weightbearing exercise and tolerated.  Continue calcium and vitamin D supplementation.  Advised to quit smoking.  Return back in 4 months.            Orders:    Ambulatory Referral to Endocrinology    Protein electrophoresis, serum; Future    Protein electrophoresis, urine; Future    C-Telopeptide; Future    NTX Panel, Urine; Future    PTH, intact; Future    Calcium; Future    Albumin; Future    Celiac Antibodies Profile; Future      History of Present Illness     Elvira Reed is a 74 y.o. female who is referred by Marti Park PA-C for evaluation of osteoporosis.    Elvira was found to have compression fractures, T7 and T4, following up with orthopedics team.    Recent DEXA scan on 3/11/2024 showed BMD of 0.775 with T-score of -3.4 and  lumbar spine, BMD of 0.568 with T-score of -3.5 in left total hip, BMD of 0.620 with T-score of -3.0 in left femoral neck and BMD of 0.610 with T-score of -3.1 in right femoral neck.  Her previous DEXA scan in 2021 showed T-score of -4.1 in lumbar spine, -3.6 in left total hip, -3.2 in left femoral neck, -3.3 in right total hip and T-score of -2.9 in right femoral neck.    She received Fosamax for approximately 4 years, and recently completed first Reclast infusion on 8/15/2024.      h/o loss of 2 inches of adult height - No  H/o Falls No  H/o malabsorption No  H/o nephrolithiasisNo  H/o Rheumatoid Arthritis No  H/o hyperthyroidism No  Family History of Osteoporosis Yes    Contributing Drugs:  glucocorticoids:  Ever: no  PPI                               No  Immunnosuppressants No                                             Antiestrogens               No                                              Antiandrogens             No  Lithium                         No                                                       Anticonvulsant             No    Diet:     lactose intolerance No  calcium intake as an adult (diet and supplements): calcium citrate daily                                               vitamin D intake :yes    Tobacco use: half a pack a day  ETOH use >2 units/day:     No     Menstrual history:   Age at menopause early 48  Estrogen therapy No    History obtained from : patient  Review of Systems   Constitutional:  Negative for appetite change, fatigue and unexpected weight change.   HENT:  Negative for trouble swallowing and voice change.    Cardiovascular:  Negative for chest pain and palpitations.   Gastrointestinal:  Negative for abdominal pain, constipation, diarrhea, nausea and vomiting.   Endocrine: Negative for cold intolerance, heat intolerance, polydipsia, polyphagia and polyuria.     Medical History Reviewed by provider this encounter:       Current Outpatient Medications on File Prior to Visit    Medication Sig Dispense Refill    Ascorbic Acid (Vitamin C) 125 MG CHEW Chew 1 tablet daily      aspirin 81 mg chewable tablet Chew 81 mg daily      B Complex Vitamins (VITAMIN B COMPLEX 100 IJ)       Calcium Carbonate-Vit D-Min (CALTRATE 600+D PLUS MINERALS PO) Take 1 tablet by mouth daily      Cetirizine HCl (ZYRTEC ALLERGY PO) Take 1 tablet by mouth as needed        Cranberry 1000 MG CAPS Take by mouth      cyanocobalamin (VITAMIN B-12) 100 mcg tablet Take by mouth daily      fluticasone-umeclidinium-vilanterol (Trelegy Ellipta) 200-62.5-25 mcg/actuation AEPB inhaler Inhale 1 puff daily 60 blister 5    Multiple Vitamin (MULTI-VITAMIN DAILY) TABS Take by mouth daily      Multiple Vitamins-Minerals (HAIR SKIN NAILS PO) Take by mouth      VITAMIN D PO Take 1 tablet by mouth every other day      Zinc Sulfate (ZINC-220 PO) Take 1 tablet by mouth daily       No current facility-administered medications on file prior to visit.          Objective     LMP  (LMP Unknown)     Physical Exam  Vitals and nursing note reviewed.   Constitutional:       General: She is not in acute distress.     Appearance: She is well-developed.   HENT:      Head: Normocephalic and atraumatic.   Cardiovascular:      Rate and Rhythm: Normal rate and regular rhythm.   Pulmonary:      Effort: Pulmonary effort is normal. No respiratory distress.   Abdominal:      Palpations: Abdomen is soft.   Musculoskeletal:         General: No swelling.      Cervical back: Neck supple.   Skin:     General: Skin is warm and dry.   Neurological:      Mental Status: She is alert.       I have spent a total time of 40 minutes in caring for this patient on the day of the visit/encounter including Diagnostic results, Prognosis, Risks and benefits of tx options, Instructions for management, Patient and family education, Importance of tx compliance, Risk factor reductions, Impressions, Counseling / Coordination of care, Documenting in the medical record, Reviewing /  ordering tests, medicine, procedures  , and Obtaining or reviewing history  .      CENTRAL  DXA SCAN     CLINICAL HISTORY: 73-year-old postmenopausal female.  OTHER RISK FACTORS: Prior fracture as a result of minor injury, COPD.     PHARMACOLOGIC THERAPY FOR OSTEOPOROSIS: Fosamax.     TECHNIQUE: Bone densitometry was performed using a GE Lunar Prodigy bone densitometer.  Regions of interest appear properly placed.     COMPARISON: 4/19/2021.     RESULTS:     LUMBAR SPINE  Level: L1-L4:  BMD: 0.775 gm/cm2  T-score: -3.4        LEFT TOTAL HIP:  BMD: 0.568 gm/cm2  T-score: -3.5     LEFT FEMORAL NECK:  BMD: 0.620 gm/cm2  T score: -3.0     RIGHT TOTAL HIP:  BMD: 0.607 gm/cm2  T-score: -3.2     RIGHT FEMORAL NECK:  BMD: 0.610 gm/cm2  T score: -3.1        IMPRESSION:     1. Osteoporosis.     2.  Since a DXA study from 4/19/2021, there has been:  A  STATISTICALLY SIGNIFICANT INCREASE in bone mineral density of 0.090 g/cm2 (13.1%) in the lumbar spine.           3.  The 10 year risk of hip fracture is 18.2% with the 10 year risk of major osteoporotic fracture being 32.2% as calculated by the [employer] fracture risk assessment tool (FRAX, which is based on data generated by the WHO Collaborating   [employer] for Metabolic Bone Diseases).     4.  The current NOF guidelines recommend treating patients with a T-score of -2.5 or less in the lumbar spine or hips, or in post-menopausal women and men over the age of 50 with low bone mass (osteopenia) and a FRAX 10 year risk score of >3% for hip   fracture and/or >20% for major osteoporotic fracture.     5.  The NOF recommends follow-up DXA in 1-2 years after initiating therapy for osteoporosis and every 2 years thereafter. More frequent evaluation is appropriate for patients with conditions associated with rapid bone loss, such as glucocorticoid   therapy. The interval between DXA screenings may be longer for individuals without major risk factors and initial T-score in the  normal or upper low bone mass range.        The FRAX algorithm has certain limitations:  -FRAX has not been validated in patients currently or previously treated with pharmacotherapy for osteoporosis.  In such patients, clinical judgment must be exercised in interpreting FRAX scores.  -Prior hip, vertebral and humeral fragility fractures appear to confer greater risk of subsequent fracture than fractures at other sites (this is especially true for individuals with severe vertebral fractures), but quantification of this incremental   risk is not possible with FRAX.  -FRAX underestimates fracture risk in patients with history of multiple fragility fractures.  -FRAX may underestimate fracture risk in patients with history of frequent falls.  -It is not appropriate to use FRAX to monitor treatment response.        WHO CLASSIFICATION:  Normal (a T-score of -1.0 or higher)  Low bone mineral density (a T-score of less than -1.0 but higher than -2.5)  Osteoporosis (a T-score of -2.5 or less)  Severe osteoporosis (a T-score of -2.5 or less with a fragility fracture)     LEAST SIGNIFICANT CHANGE AT 95% C.I:  Lumbar spine: 0.030 gm/cm2 (2.5%).  Total hip: 0.015 gm/cm2 (1.7%).  Forearm: 0.034 gm/cm2 (5.2%).              Narrative & Impression   MRI THORACIC SPINE WITHOUT CONTRAST     INDICATION: M43.9: Deforming dorsopathy, unspecified.     COMPARISON: Thoracic spine radiographs 6/11/2024.     TECHNIQUE:  Multiplanar, multisequence imaging of the thoracic spine was performed. .     IMAGE QUALITY: Diagnostic.     FINDINGS:     ALIGNMENT: Dextroscoliosis of the thoracic spine. Acute/subacute T7 compression fracture with up to approximately 50% maximal height loss. No associated retropulsion.     Chronic T4 compression fracture with approximately 35% maximal height loss.     MARROW SIGNAL: Edema noted in the T7 vertebral body.     THORACIC CORD: No convincing cord signal abnormality that can be confirmed in 2 planes.      PARAVERTEBRAL SOFT TISSUES: No acute abnormality.     THORACIC DEGENERATIVE CHANGE: Multilevel spondylotic changes without high-grade neuroforaminal or spinal canal stenosis.     OTHER FINDINGS: Possible 7 mm left upper lobe nodule (series 8, image 15).     IMPRESSION:     1. Acute/subacute T7 compression fracture with up to approximately 50% maximal height loss. No associated retropulsion.     2. Chronic T4 compression fracture with approximately 35% maximal height loss.     3. No high-grade thoracic neuroforaminal or central spinal canal stenosis.     4. Possible 7 mm left upper lobe nodule, for which further evaluation with chest CT is recommended, as clinically appropriate in the context of the patient's care. Note is made that the patient has previously had lung cancer screening CTs and this could   be further evaluated on the yearly screening examination.     The study was marked in EPIC for significant notification.           Workstation performed: [id number]

## 2024-10-31 ENCOUNTER — TELEMEDICINE (OUTPATIENT)
Dept: INTERNAL MEDICINE CLINIC | Facility: CLINIC | Age: 74
End: 2024-10-31
Payer: MEDICARE

## 2024-10-31 DIAGNOSIS — U07.1 COVID-19: Primary | ICD-10-CM

## 2024-10-31 PROCEDURE — 99213 OFFICE O/P EST LOW 20 MIN: CPT | Performed by: NURSE PRACTITIONER

## 2024-10-31 PROCEDURE — G2211 COMPLEX E/M VISIT ADD ON: HCPCS | Performed by: NURSE PRACTITIONER

## 2024-10-31 RX ORDER — FLUTICASONE PROPIONATE 50 MCG
1 SPRAY, SUSPENSION (ML) NASAL DAILY
Qty: 1 G | Refills: 3 | Status: SHIPPED | OUTPATIENT
Start: 2024-10-31

## 2024-10-31 RX ORDER — METHYLPREDNISOLONE 4 MG/1
TABLET ORAL
Qty: 21 EACH | Refills: 0 | Status: SHIPPED | OUTPATIENT
Start: 2024-10-31

## 2024-10-31 RX ORDER — DOXYCYCLINE 100 MG/1
100 CAPSULE ORAL 2 TIMES DAILY
Qty: 20 CAPSULE | Refills: 0 | Status: SHIPPED | OUTPATIENT
Start: 2024-10-31 | End: 2024-11-10

## 2024-10-31 NOTE — PROGRESS NOTES
Virtual Regular Visit  Name: Elvira Reed      : 1950      MRN: [id number]  Encounter Provider: SAROJ Finch  Encounter Date: 10/31/2024   Encounter department: [facility]    Verification of patient location:    Patient is located at Home in the following state in which I hold an active license [place]    Assessment & Plan  COVID-19    Orders:    doxycycline monohydrate (MONODOX) 100 mg capsule; Take 1 capsule (100 mg total) by mouth 2 (two) times a day for 10 days    fluticasone (FLONASE) 50 mcg/act nasal spray; 1 spray into each nostril daily    methylPREDNISolone 4 MG tablet therapy pack; Use as directed on package       Will start on doxy, flonase, and medrol take as directed. If symptoms worsen did advise to call the office.    Encounter provider ASROJ Finch    The patient was identified by name and date of birth. Elvira Reed was informed that this is a telemedicine visit and that the visit is being conducted through Telephone.  My office door was closed. No one else was in the room.  She acknowledged consent and understanding of privacy and security of the video platform. The patient has agreed to participate and understands they can discontinue the visit at any time.    Patient is aware this is a billable service.     History of Present Illness     Elvira is for an acute visit. She is having ear pressure and congestion. She did test positive for covid on Tuesday. She denies any fever. She is having some fatigue. She states she is doing well other than the congestion. She offers no other issues.          Review of Systems   HENT:  Positive for congestion and ear pain.    All other systems reviewed and are negative.          Objective     LMP  (LMP Unknown)   Physical Exam  Constitutional:       Appearance: Normal appearance. She is normal weight.   Skin:     Capillary Refill: Capillary refill takes less than 2 seconds.   Neurological:       General: No focal deficit present.      Mental Status: She is alert and oriented to person, place, and time. Mental status is at baseline.   Psychiatric:         Mood and Affect: Mood normal.         Behavior: Behavior normal.         Thought Content: Thought content normal.         Judgment: Judgment normal.         Visit Time  Total Visit Duration: 15

## 2024-10-31 NOTE — ASSESSMENT & PLAN NOTE
Orders:    doxycycline monohydrate (MONODOX) 100 mg capsule; Take 1 capsule (100 mg total) by mouth 2 (two) times a day for 10 days    fluticasone (FLONASE) 50 mcg/act nasal spray; 1 spray into each nostril daily    methylPREDNISolone 4 MG tablet therapy pack; Use as directed on package

## 2025-01-24 ENCOUNTER — APPOINTMENT (OUTPATIENT)
Age: 75
End: 2025-01-24
Payer: MEDICARE

## 2025-01-24 DIAGNOSIS — M81.0 AGE-RELATED OSTEOPOROSIS WITHOUT CURRENT PATHOLOGICAL FRACTURE: ICD-10-CM

## 2025-01-24 DIAGNOSIS — S22.000A COMPRESSION FRACTURE OF BODY OF THORACIC VERTEBRA (HCC): ICD-10-CM

## 2025-01-24 DIAGNOSIS — M80.00XA OSTEOPOROSIS WITH CURRENT PATHOLOGICAL FRACTURE, UNSPECIFIED OSTEOPOROSIS TYPE, INITIAL ENCOUNTER: ICD-10-CM

## 2025-01-24 DIAGNOSIS — J44.9 SEVERE CHRONIC OBSTRUCTIVE PULMONARY DISEASE (HCC): ICD-10-CM

## 2025-01-24 DIAGNOSIS — E44.1 MILD PROTEIN-CALORIE MALNUTRITION (HCC): ICD-10-CM

## 2025-01-24 DIAGNOSIS — E78.5 HYPERLIPIDEMIA, UNSPECIFIED HYPERLIPIDEMIA TYPE: ICD-10-CM

## 2025-01-24 LAB
25(OH)D3 SERPL-MCNC: 73.5 NG/ML (ref 30–100)
ALBUMIN SERPL BCG-MCNC: 4.2 G/DL (ref 3.5–5)
ALP SERPL-CCNC: 74 U/L (ref 34–104)
ALT SERPL W P-5'-P-CCNC: 13 U/L (ref 7–52)
ANION GAP SERPL CALCULATED.3IONS-SCNC: 6 MMOL/L (ref 4–13)
AST SERPL W P-5'-P-CCNC: 14 U/L (ref 13–39)
BASOPHILS # BLD AUTO: 0.09 THOUSANDS/ΜL (ref 0–0.1)
BASOPHILS NFR BLD AUTO: 1 % (ref 0–1)
BILIRUB SERPL-MCNC: 0.37 MG/DL (ref 0.2–1)
BUN SERPL-MCNC: 24 MG/DL (ref 5–25)
CALCIUM SERPL-MCNC: 9.1 MG/DL (ref 8.4–10.2)
CHLORIDE SERPL-SCNC: 105 MMOL/L (ref 96–108)
CHOLEST SERPL-MCNC: 186 MG/DL (ref ?–200)
CO2 SERPL-SCNC: 28 MMOL/L (ref 21–32)
CREAT SERPL-MCNC: 0.71 MG/DL (ref 0.6–1.3)
EOSINOPHIL # BLD AUTO: 0.45 THOUSAND/ΜL (ref 0–0.61)
EOSINOPHIL NFR BLD AUTO: 6 % (ref 0–6)
ERYTHROCYTE [DISTWIDTH] IN BLOOD BY AUTOMATED COUNT: 15.5 % (ref 11.6–15.1)
GFR SERPL CREATININE-BSD FRML MDRD: 82 ML/MIN/1.73SQ M
GLUCOSE P FAST SERPL-MCNC: 82 MG/DL (ref 65–99)
HCT VFR BLD AUTO: 41.5 % (ref 34.8–46.1)
HDLC SERPL-MCNC: 75 MG/DL
HGB BLD-MCNC: 13.5 G/DL (ref 11.5–15.4)
IGA SERPL-MCNC: 367 MG/DL (ref 66–433)
IMM GRANULOCYTES # BLD AUTO: 0.02 THOUSAND/UL (ref 0–0.2)
IMM GRANULOCYTES NFR BLD AUTO: 0 % (ref 0–2)
LDLC SERPL CALC-MCNC: 97 MG/DL (ref 0–100)
LYMPHOCYTES # BLD AUTO: 2.49 THOUSANDS/ΜL (ref 0.6–4.47)
LYMPHOCYTES NFR BLD AUTO: 33 % (ref 14–44)
MCH RBC QN AUTO: 29.9 PG (ref 26.8–34.3)
MCHC RBC AUTO-ENTMCNC: 32.5 G/DL (ref 31.4–37.4)
MCV RBC AUTO: 92 FL (ref 82–98)
MONOCYTES # BLD AUTO: 0.68 THOUSAND/ΜL (ref 0.17–1.22)
MONOCYTES NFR BLD AUTO: 9 % (ref 4–12)
NEUTROPHILS # BLD AUTO: 3.73 THOUSANDS/ΜL (ref 1.85–7.62)
NEUTS SEG NFR BLD AUTO: 51 % (ref 43–75)
NONHDLC SERPL-MCNC: 111 MG/DL
NRBC BLD AUTO-RTO: 0 /100 WBCS
PLATELET # BLD AUTO: 245 THOUSANDS/UL (ref 149–390)
PMV BLD AUTO: 9.9 FL (ref 8.9–12.7)
POTASSIUM SERPL-SCNC: 3.8 MMOL/L (ref 3.5–5.3)
PROT SERPL-MCNC: 7.1 G/DL (ref 6.4–8.4)
PTH-INTACT SERPL-MCNC: 44.6 PG/ML (ref 12–88)
RBC # BLD AUTO: 4.52 MILLION/UL (ref 3.81–5.12)
SODIUM SERPL-SCNC: 139 MMOL/L (ref 135–147)
TRIGL SERPL-MCNC: 71 MG/DL (ref ?–150)
TSH SERPL DL<=0.05 MIU/L-ACNC: 2.97 UIU/ML (ref 0.45–4.5)
WBC # BLD AUTO: 7.46 THOUSAND/UL (ref 4.31–10.16)

## 2025-01-24 PROCEDURE — 83970 ASSAY OF PARATHORMONE: CPT

## 2025-01-24 PROCEDURE — 82306 VITAMIN D 25 HYDROXY: CPT

## 2025-01-24 PROCEDURE — 82523 COLLAGEN CROSSLINKS: CPT

## 2025-01-24 PROCEDURE — 84166 PROTEIN E-PHORESIS/URINE/CSF: CPT

## 2025-01-24 PROCEDURE — 80053 COMPREHEN METABOLIC PANEL: CPT

## 2025-01-24 PROCEDURE — 80061 LIPID PANEL: CPT

## 2025-01-24 PROCEDURE — 84443 ASSAY THYROID STIM HORMONE: CPT

## 2025-01-24 PROCEDURE — 82784 ASSAY IGA/IGD/IGG/IGM EACH: CPT

## 2025-01-24 PROCEDURE — 36415 COLL VENOUS BLD VENIPUNCTURE: CPT

## 2025-01-24 PROCEDURE — 82570 ASSAY OF URINE CREATININE: CPT

## 2025-01-24 PROCEDURE — 86364 TISS TRNSGLTMNASE EA IG CLAS: CPT

## 2025-01-24 PROCEDURE — 84165 PROTEIN E-PHORESIS SERUM: CPT

## 2025-01-24 PROCEDURE — 85025 COMPLETE CBC W/AUTO DIFF WBC: CPT

## 2025-01-27 ENCOUNTER — RESULTS FOLLOW-UP (OUTPATIENT)
Dept: ENDOCRINOLOGY | Facility: CLINIC | Age: 75
End: 2025-01-27

## 2025-01-27 ENCOUNTER — OFFICE VISIT (OUTPATIENT)
Dept: INTERNAL MEDICINE CLINIC | Facility: CLINIC | Age: 75
End: 2025-01-27
Payer: MEDICARE

## 2025-01-27 VITALS
OXYGEN SATURATION: 96 % | DIASTOLIC BLOOD PRESSURE: 80 MMHG | WEIGHT: 121 LBS | TEMPERATURE: 97.2 F | HEIGHT: 63 IN | HEART RATE: 66 BPM | SYSTOLIC BLOOD PRESSURE: 134 MMHG | BODY MASS INDEX: 21.44 KG/M2

## 2025-01-27 DIAGNOSIS — E44.1 MILD PROTEIN-CALORIE MALNUTRITION (HCC): ICD-10-CM

## 2025-01-27 DIAGNOSIS — S22.000A COMPRESSION FRACTURE OF BODY OF THORACIC VERTEBRA (HCC): ICD-10-CM

## 2025-01-27 DIAGNOSIS — M81.0 AGE-RELATED OSTEOPOROSIS WITHOUT CURRENT PATHOLOGICAL FRACTURE: ICD-10-CM

## 2025-01-27 DIAGNOSIS — E78.5 HYPERLIPIDEMIA, UNSPECIFIED HYPERLIPIDEMIA TYPE: ICD-10-CM

## 2025-01-27 DIAGNOSIS — J44.9 SEVERE CHRONIC OBSTRUCTIVE PULMONARY DISEASE (HCC): Primary | ICD-10-CM

## 2025-01-27 DIAGNOSIS — E55.9 VITAMIN D DEFICIENCY: ICD-10-CM

## 2025-01-27 LAB
COLLAGEN NTX UR-SCNC: 102 NMOL BCE
COLLAGEN NTX/CREAT UR-SRTO: 20 NM BCE/MM CR (ref 0–89)
CREAT UR-MCNC: 57.9 MG/DL
INTERPRETIVE GUIDE:: NORMAL
TTG IGA SER IA-ACNC: 0.7 U/ML (ref ?–10)

## 2025-01-27 PROCEDURE — 99214 OFFICE O/P EST MOD 30 MIN: CPT | Performed by: FAMILY MEDICINE

## 2025-01-27 PROCEDURE — G2211 COMPLEX E/M VISIT ADD ON: HCPCS | Performed by: FAMILY MEDICINE

## 2025-01-27 RX ORDER — MV-MN/OM3/DHA/EPA/FISH/LUT/ZEA 250-5-1 MG
CAPSULE ORAL
COMMUNITY
Start: 2024-08-28

## 2025-01-28 PROBLEM — R73.9 HYPERGLYCEMIA: Status: RESOLVED | Noted: 2020-04-27 | Resolved: 2025-01-28

## 2025-01-28 PROBLEM — Z01.818 PRE-OP EVALUATION: Status: RESOLVED | Noted: 2023-04-17 | Resolved: 2025-01-28

## 2025-01-28 PROBLEM — U07.1 COVID-19: Status: RESOLVED | Noted: 2024-10-31 | Resolved: 2025-01-28

## 2025-01-28 LAB
ALBUMIN SERPL ELPH-MCNC: 4.09 G/DL (ref 3.2–5.1)
ALBUMIN SERPL ELPH-MCNC: 58.4 % (ref 48–70)
ALBUMIN UR ELPH-MCNC: 100 %
ALPHA1 GLOB MFR UR ELPH: 0 %
ALPHA1 GLOB SERPL ELPH-MCNC: 0.3 G/DL (ref 0.15–0.47)
ALPHA1 GLOB SERPL ELPH-MCNC: 4.3 % (ref 1.8–7)
ALPHA2 GLOB MFR UR ELPH: 0 %
ALPHA2 GLOB SERPL ELPH-MCNC: 0.73 G/DL (ref 0.42–1.04)
ALPHA2 GLOB SERPL ELPH-MCNC: 10.4 % (ref 5.9–14.9)
B-GLOBULIN MFR UR ELPH: 0 %
BETA GLOB ABNORMAL SERPL ELPH-MCNC: 0.52 G/DL (ref 0.31–0.57)
BETA1 GLOB SERPL ELPH-MCNC: 7.4 % (ref 4.7–7.7)
BETA2 GLOB SERPL ELPH-MCNC: 6.7 % (ref 3.1–7.9)
BETA2+GAMMA GLOB SERPL ELPH-MCNC: 0.47 G/DL (ref 0.2–0.58)
GAMMA GLOB ABNORMAL SERPL ELPH-MCNC: 0.9 G/DL (ref 0.4–1.66)
GAMMA GLOB MFR UR ELPH: 0 %
GAMMA GLOB SERPL ELPH-MCNC: 12.8 % (ref 6.9–22.3)
IGG/ALB SER: 1.4 {RATIO} (ref 1.1–1.8)
PROT PATTERN SERPL ELPH-IMP: NORMAL
PROT PATTERN UR ELPH-IMP: NORMAL
PROT SERPL-MCNC: 7 G/DL (ref 6.4–8.2)
PROT UR-MCNC: 8.9 MG/DL

## 2025-01-28 PROCEDURE — 84166 PROTEIN E-PHORESIS/URINE/CSF: CPT | Performed by: PATHOLOGY

## 2025-01-28 PROCEDURE — 84165 PROTEIN E-PHORESIS SERUM: CPT | Performed by: PATHOLOGY

## 2025-01-28 NOTE — ASSESSMENT & PLAN NOTE
Continue to eat small but frequent meals.  Try to increase protein in diet.  Orders:  •  CBC and differential; Future

## 2025-01-28 NOTE — ASSESSMENT & PLAN NOTE
Previous episode of COVID after returning from vacation discussed.  Watch for any increased respiratory issues.  Continue to use the Trelegy.  Orders:  •  CBC and differential; Future

## 2025-01-28 NOTE — ASSESSMENT & PLAN NOTE
Reviewed previous laboratory testing with her.  Cholesterol slightly above goal.  Discussed goal for LDL.  Watch fatty food intake.  Increase fiber in diet.  Orders:  •  CBC and differential; Future  •  Comprehensive metabolic panel; Future  •  Lipid panel; Future  •  TSH, 3rd generation; Future

## 2025-01-28 NOTE — PROGRESS NOTES
Name: Elvira Reed      : 1950      MRN: [id number]  Encounter Provider: Karla Coates MD  Encounter Date: 2025   Encounter department: [facility] [place]  :  Assessment & Plan  Severe chronic obstructive pulmonary disease (HCC)  Previous episode of COVID after returning from vacation discussed.  Watch for any increased respiratory issues.  Continue to use the Trelegy.  Orders:  •  CBC and differential; Future    Compression fracture of body of thoracic vertebra (HCC)  Back pain discussed.  Follow-up with specialist as planned.  Discussed ongoing treatment for the osteoporosis.  Continue with calcium and vitamin D supplementation.  Be very careful to avoid falls.  Orders:  •  CBC and differential; Future    Age-related osteoporosis without current pathological fracture  See above.  Orders:  •  CBC and differential; Future  •  Vitamin D 25 hydroxy; Future    Hyperlipidemia, unspecified hyperlipidemia type  Reviewed previous laboratory testing with her.  Cholesterol slightly above goal.  Discussed goal for LDL.  Watch fatty food intake.  Increase fiber in diet.  Orders:  •  CBC and differential; Future  •  Comprehensive metabolic panel; Future  •  Lipid panel; Future  •  TSH, 3rd generation; Future    Vitamin D deficiency  Continue with vitamin D supplementation.  Will continue to follow vitamin D level with routine laboratory testing and adjust dose if needed.  Orders:  •  CBC and differential; Future  •  Vitamin D 25 hydroxy; Future    Mild protein-calorie malnutrition (HCC)  Continue to eat small but frequent meals.  Try to increase protein in diet.  Orders:  •  CBC and differential; Future      Orders and recommendations as noted above.  She is up-to-date on her immunizations.  Up-to-date on mammogram and bone density.  Will have her follow-up in about 3 to 5 months or sooner if needed.    Depression Screening and Follow-up Plan: Patient was screened for depression during  "today's encounter. They screened negative with a PHQ-2 score of 0.      History of Present Illness   She presents for routine follow-up.  Has generally been doing relatively well.  She continues with the back pain after the compression fractures.  She continues to follow-up with endocrinology for this.  Breathing has been relatively stable.  She admits that she does not always use the Trelegy on a daily basis.  She did use it daily when she was on her trip to [place] and does feel that it helped.  Tries to remain as active as possible.  Continues to work on a regular basis.  Appetite has been stable.  Eats regularly.      Review of Systems   Constitutional:  Positive for fatigue. Negative for activity change, appetite change, chills and fever.   HENT:  Negative for congestion and rhinorrhea.    Eyes:  Negative for visual disturbance.   Respiratory:  Negative for chest tightness and shortness of breath.    Cardiovascular:  Negative for chest pain and palpitations.   Gastrointestinal:  Negative for abdominal pain, blood in stool, diarrhea, nausea and vomiting.   Endocrine: Negative for polydipsia, polyphagia and polyuria.   Genitourinary:  Negative for dysuria, frequency and urgency.   Musculoskeletal:  Positive for back pain. Negative for gait problem.   Skin:  Negative for color change.   Neurological:  Negative for dizziness and headaches.   Hematological:  Does not bruise/bleed easily.   Psychiatric/Behavioral:  Negative for confusion and sleep disturbance. The patient is not nervous/anxious.        Objective   /80 (BP Location: Left arm, Patient Position: Sitting, Cuff Size: Standard)   Pulse 66   Temp (!) 97.2 °F (36.2 °C)   Ht 5' 3\" (1.6 m)   Wt 54.9 kg (121 lb)   LMP  (LMP Unknown)   SpO2 96%   BMI 21.43 kg/m²      Physical Exam  Vitals and nursing note reviewed.   Constitutional:       General: She is not in acute distress.     Appearance: She is well-developed, well-groomed and underweight. "   HENT:      Head: Normocephalic and atraumatic.   Eyes:      General:         Right eye: No discharge.         Left eye: No discharge.      Conjunctiva/sclera: Conjunctivae normal.      Pupils: Pupils are equal, round, and reactive to light.   Cardiovascular:      Rate and Rhythm: Normal rate and regular rhythm.      Heart sounds: Normal heart sounds. No murmur heard.     No friction rub. No gallop.   Pulmonary:      Effort: No respiratory distress.      Breath sounds: Decreased breath sounds present. No wheezing or rales.   Abdominal:      General: Bowel sounds are normal. There is no distension.      Tenderness: There is no abdominal tenderness.   Lymphadenopathy:      Cervical: No cervical adenopathy.   Skin:     General: Skin is warm and dry.   Neurological:      Mental Status: She is alert and oriented to person, place, and time.   Psychiatric:         Mood and Affect: Mood and affect normal.         Speech: Speech normal.         Behavior: Behavior normal. Behavior is cooperative.         Cognition and Memory: Cognition and memory normal.

## 2025-01-28 NOTE — ASSESSMENT & PLAN NOTE
Back pain discussed.  Follow-up with specialist as planned.  Discussed ongoing treatment for the osteoporosis.  Continue with calcium and vitamin D supplementation.  Be very careful to avoid falls.  Orders:  •  CBC and differential; Future

## 2025-01-30 LAB — COLLAGEN CTX SERPL-MCNC: 100 PG/ML

## 2025-02-05 ENCOUNTER — OFFICE VISIT (OUTPATIENT)
Dept: ENDOCRINOLOGY | Facility: CLINIC | Age: 75
End: 2025-02-05
Payer: MEDICARE

## 2025-02-05 VITALS
OXYGEN SATURATION: 96 % | TEMPERATURE: 97.5 F | HEIGHT: 63 IN | WEIGHT: 121.8 LBS | RESPIRATION RATE: 18 BRPM | SYSTOLIC BLOOD PRESSURE: 126 MMHG | BODY MASS INDEX: 21.58 KG/M2 | DIASTOLIC BLOOD PRESSURE: 68 MMHG | HEART RATE: 63 BPM

## 2025-02-05 DIAGNOSIS — M80.00XD AGE-RELATED OSTEOPOROSIS WITH CURRENT PATHOLOGICAL FRACTURE WITH ROUTINE HEALING: Primary | ICD-10-CM

## 2025-02-05 PROCEDURE — 99213 OFFICE O/P EST LOW 20 MIN: CPT | Performed by: STUDENT IN AN ORGANIZED HEALTH CARE EDUCATION/TRAINING PROGRAM

## 2025-02-05 NOTE — PROGRESS NOTES
Name: Elvira Reed      : 1950      MRN: [id number]  Encounter Provider: Eunice Moran MD  Encounter Date: 2025   Encounter department: [facility] [place]  :  Assessment & Plan  Age-related osteoporosis with current pathological fracture with routine healing  Severe osteoporosis with T-score of -3.4 in lumbar spine, -3.5 in femoral neck, -3.2 in right total hip and -3.1 in right femoral neck, with thoracic lumbar compression fracture.  Evaluation for secondary causes of osteoporosis unremarkable.  She was previously on oral bisphosphonate, received first dose of Reclast by primary team in 2024.  Although bone density scan showed improvement in her lumbar spine, I believe this is falsely improvement given history of compression fracture.  She will benefit from anabolic agent, although we agreed to continue with Reclast and to repeat bone density scan after next dose, if there is no improvement in bone density scan, will consider starting Evenity monthly injections.  Fall safety precautions reviewed.  Weightbearing exercises as tolerated.  Calcium daily requirement of 1200 mg and vitamin D 2000 IU discussed.  Return back in 6 months.               History of Present Illness   HPI  Elvira Reed is a 74 y.o. female who presents for follow-up for severe osteoporosis.    She has history of compression fraction of thoracic spine T4 and T7.  Previously tried Fosamax for 4 to 5 years.  Primary team started Reclast, first dose in 2024.        Review of Systems   Constitutional:  Negative for fatigue and unexpected weight change.   Endocrine: Negative for polydipsia and polyuria.          Objective   LMP  (LMP Unknown)      Physical Exam  Vitals and nursing note reviewed.   Constitutional:       General: She is not in acute distress.     Appearance: She is well-developed.   HENT:      Head: Normocephalic and atraumatic.   Cardiovascular:      Rate and  Rhythm: Normal rate and regular rhythm.   Pulmonary:      Effort: Pulmonary effort is normal. No respiratory distress.   Abdominal:      Palpations: Abdomen is soft.   Musculoskeletal:         General: No swelling.      Cervical back: Neck supple.   Skin:     General: Skin is warm and dry.   Neurological:      Mental Status: She is alert.             Component      Latest Ref Rng 1/24/2025   Albumin/Globulin Ratio      1.10 - 1.80  1.40    ALBUMIN ELP      48.0 - 70.0 % 58.4    ALBUMIN CONC ELP      3.20 - 5.10 g/dl 4.09    ALPHA 1      1.8 - 7.0 % 4.3    ALPHA 1 CONC ELP      0.15 - 0.47 g/dL 0.30    ALPHA 2      5.9 - 14.9 % 10.4    ALPHA 2 CONC ELP      0.42 - 1.04 g/dL 0.73    BETA-1      4.7 - 7.7 % 7.4    BETA 1 CONC ELP      0.31 - 0.57 g/dL 0.52    BETA-2      3.1 - 7.9 % 6.7    BETA 2 CONC ELP      0.20 - 0.58 g/dL 0.47    GAMMA GLOBULIN      6.9 - 22.3 % 12.8    GAMMA CONC ELP      0.40 - 1.66 g/dL 0.90    Total Protein      6.4 - 8.2 g/dL 7.0    SPEP INTERPRETATION See Comment    Urine Protein      mg/dL 8.9    Albumin, Urine      % 100.0    ALPHA 1 URINE      % 0.0    ALPHA 2 URINE      % 0.0    BETA URINE      % 0.0    GAMMA GLOBULIN URINE      % 0.0    UPEP INTERPRETATION See Comment    N-TELEOPEPTIDE      Not Estab. nmol     EXT Creatinine Urine      Not Estab. mg/dL 57.9    N-TELO/CREAT. RATIO      0 - 89 nM BCE/mM Cr 20    INTERPRETIVE GUIDE: Comment    TSH 3RD GENERATON      0.450 - 4.500 uIU/mL 2.974    VITAMIN D, 25-HYDROXY      30.0 - 100.0 ng/mL 73.5    C-TELOPEPTIDE,SERUM      pg/mL 100    PTH      12.0 - 88.0 pg/mL 44.6    TTG IGA      See Comment U/mL 0.7    IGA      66 - 433 mg/dL 367

## 2025-02-05 NOTE — ASSESSMENT & PLAN NOTE
Severe osteoporosis with T-score of -3.4 in lumbar spine, -3.5 in femoral neck, -3.2 in right total hip and -3.1 in right femoral neck, with thoracic lumbar compression fracture.  Evaluation for secondary causes of osteoporosis unremarkable.  She was previously on oral bisphosphonate, received first dose of Reclast by primary team in August 2024.  Although bone density scan showed improvement in her lumbar spine, I believe this is falsely improvement given history of compression fracture.  She will benefit from anabolic agent, although we agreed to continue with Reclast and to repeat bone density scan after next dose, if there is no improvement in bone density scan, will consider starting Evenity monthly injections.  Fall safety precautions reviewed.  Weightbearing exercises as tolerated.  Calcium daily requirement of 1200 mg and vitamin D 2000 IU discussed.  Return back in 6 months.

## 2025-07-22 ENCOUNTER — TELEPHONE (OUTPATIENT)
Age: 75
End: 2025-07-22

## 2025-07-22 NOTE — TELEPHONE ENCOUNTER
Patient wants to advise that her BP has been running 147/77 - 136/82 - 156/81 - 157/87. She has a med wellness appt on 8/4 and wants to know if she should come in sooner or will it be  ok to wait until then   Please call patient to advise

## 2025-07-31 ENCOUNTER — APPOINTMENT (OUTPATIENT)
Age: 75
End: 2025-07-31
Payer: MEDICARE

## 2025-07-31 DIAGNOSIS — M80.00XD AGE-RELATED OSTEOPOROSIS WITH CURRENT PATHOLOGICAL FRACTURE WITH ROUTINE HEALING: ICD-10-CM

## 2025-07-31 DIAGNOSIS — E44.1 MILD PROTEIN-CALORIE MALNUTRITION (HCC): ICD-10-CM

## 2025-07-31 DIAGNOSIS — M81.0 AGE-RELATED OSTEOPOROSIS WITHOUT CURRENT PATHOLOGICAL FRACTURE: ICD-10-CM

## 2025-07-31 DIAGNOSIS — J44.9 SEVERE CHRONIC OBSTRUCTIVE PULMONARY DISEASE (HCC): ICD-10-CM

## 2025-07-31 DIAGNOSIS — E78.5 HYPERLIPIDEMIA, UNSPECIFIED HYPERLIPIDEMIA TYPE: ICD-10-CM

## 2025-07-31 DIAGNOSIS — S22.000A COMPRESSION FRACTURE OF BODY OF THORACIC VERTEBRA (HCC): ICD-10-CM

## 2025-07-31 DIAGNOSIS — E55.9 VITAMIN D DEFICIENCY: ICD-10-CM

## 2025-07-31 LAB
25(OH)D3 SERPL-MCNC: 66.1 NG/ML (ref 30–100)
ALBUMIN SERPL BCG-MCNC: 3.9 G/DL (ref 3.5–5)
ALP SERPL-CCNC: 86 U/L (ref 34–104)
ALT SERPL W P-5'-P-CCNC: 13 U/L (ref 7–52)
ANION GAP SERPL CALCULATED.3IONS-SCNC: 8 MMOL/L (ref 4–13)
AST SERPL W P-5'-P-CCNC: 17 U/L (ref 13–39)
BASOPHILS # BLD AUTO: 0.07 THOUSANDS/ÂΜL (ref 0–0.1)
BASOPHILS NFR BLD AUTO: 1 % (ref 0–1)
BILIRUB SERPL-MCNC: 0.38 MG/DL (ref 0.2–1)
BUN SERPL-MCNC: 16 MG/DL (ref 5–25)
CALCIUM SERPL-MCNC: 8.9 MG/DL (ref 8.4–10.2)
CHLORIDE SERPL-SCNC: 103 MMOL/L (ref 96–108)
CHOLEST SERPL-MCNC: 170 MG/DL (ref ?–200)
CO2 SERPL-SCNC: 25 MMOL/L (ref 21–32)
CREAT SERPL-MCNC: 0.8 MG/DL (ref 0.6–1.3)
EOSINOPHIL # BLD AUTO: 0.44 THOUSAND/ÂΜL (ref 0–0.61)
EOSINOPHIL NFR BLD AUTO: 6 % (ref 0–6)
ERYTHROCYTE [DISTWIDTH] IN BLOOD BY AUTOMATED COUNT: 14.8 % (ref 11.6–15.1)
GFR SERPL CREATININE-BSD FRML MDRD: 71 ML/MIN/1.73SQ M
GLUCOSE P FAST SERPL-MCNC: 73 MG/DL (ref 65–99)
HCT VFR BLD AUTO: 41.9 % (ref 34.8–46.1)
HDLC SERPL-MCNC: 64 MG/DL
HGB BLD-MCNC: 13.5 G/DL (ref 11.5–15.4)
IMM GRANULOCYTES # BLD AUTO: 0.02 THOUSAND/UL (ref 0–0.2)
IMM GRANULOCYTES NFR BLD AUTO: 0 % (ref 0–2)
LDLC SERPL CALC-MCNC: 88 MG/DL (ref 0–100)
LYMPHOCYTES # BLD AUTO: 2.72 THOUSANDS/ÂΜL (ref 0.6–4.47)
LYMPHOCYTES NFR BLD AUTO: 34 % (ref 14–44)
MCH RBC QN AUTO: 29.5 PG (ref 26.8–34.3)
MCHC RBC AUTO-ENTMCNC: 32.2 G/DL (ref 31.4–37.4)
MCV RBC AUTO: 92 FL (ref 82–98)
MONOCYTES # BLD AUTO: 0.77 THOUSAND/ÂΜL (ref 0.17–1.22)
MONOCYTES NFR BLD AUTO: 10 % (ref 4–12)
NEUTROPHILS # BLD AUTO: 3.98 THOUSANDS/ÂΜL (ref 1.85–7.62)
NEUTS SEG NFR BLD AUTO: 49 % (ref 43–75)
NONHDLC SERPL-MCNC: 106 MG/DL
NRBC BLD AUTO-RTO: 0 /100 WBCS
PLATELET # BLD AUTO: 233 THOUSANDS/UL (ref 149–390)
PMV BLD AUTO: 9.6 FL (ref 8.9–12.7)
POTASSIUM SERPL-SCNC: 4.4 MMOL/L (ref 3.5–5.3)
PROT SERPL-MCNC: 6.7 G/DL (ref 6.4–8.4)
RBC # BLD AUTO: 4.58 MILLION/UL (ref 3.81–5.12)
SODIUM SERPL-SCNC: 136 MMOL/L (ref 135–147)
TRIGL SERPL-MCNC: 90 MG/DL (ref ?–150)
TSH SERPL DL<=0.05 MIU/L-ACNC: 2.57 UIU/ML (ref 0.45–4.5)
WBC # BLD AUTO: 8 THOUSAND/UL (ref 4.31–10.16)

## 2025-07-31 PROCEDURE — 82306 VITAMIN D 25 HYDROXY: CPT

## 2025-07-31 PROCEDURE — 36415 COLL VENOUS BLD VENIPUNCTURE: CPT

## 2025-07-31 PROCEDURE — 80053 COMPREHEN METABOLIC PANEL: CPT

## 2025-07-31 PROCEDURE — 85025 COMPLETE CBC W/AUTO DIFF WBC: CPT

## 2025-07-31 PROCEDURE — 84443 ASSAY THYROID STIM HORMONE: CPT

## 2025-07-31 PROCEDURE — 80061 LIPID PANEL: CPT

## 2025-08-04 ENCOUNTER — OFFICE VISIT (OUTPATIENT)
Dept: INTERNAL MEDICINE CLINIC | Facility: CLINIC | Age: 75
End: 2025-08-04
Payer: MEDICARE

## 2025-08-04 VITALS
BODY MASS INDEX: 21.42 KG/M2 | DIASTOLIC BLOOD PRESSURE: 86 MMHG | TEMPERATURE: 97.3 F | HEIGHT: 63 IN | SYSTOLIC BLOOD PRESSURE: 148 MMHG | HEART RATE: 60 BPM | WEIGHT: 120.9 LBS | OXYGEN SATURATION: 96 %

## 2025-08-04 DIAGNOSIS — M80.00XD AGE-RELATED OSTEOPOROSIS WITH CURRENT PATHOLOGICAL FRACTURE WITH ROUTINE HEALING: ICD-10-CM

## 2025-08-04 DIAGNOSIS — E78.5 HYPERLIPIDEMIA, UNSPECIFIED HYPERLIPIDEMIA TYPE: ICD-10-CM

## 2025-08-04 DIAGNOSIS — J44.9 SEVERE CHRONIC OBSTRUCTIVE PULMONARY DISEASE (HCC): ICD-10-CM

## 2025-08-04 DIAGNOSIS — E55.9 VITAMIN D DEFICIENCY: ICD-10-CM

## 2025-08-04 DIAGNOSIS — I10 PRIMARY HYPERTENSION: Primary | ICD-10-CM

## 2025-08-04 PROBLEM — Z98.890 POSTOPERATIVE STATE: Status: RESOLVED | Noted: 2023-04-24 | Resolved: 2025-08-04

## 2025-08-04 PROCEDURE — G2211 COMPLEX E/M VISIT ADD ON: HCPCS | Performed by: FAMILY MEDICINE

## 2025-08-04 PROCEDURE — 99214 OFFICE O/P EST MOD 30 MIN: CPT | Performed by: FAMILY MEDICINE

## 2025-08-04 PROCEDURE — G0439 PPPS, SUBSEQ VISIT: HCPCS | Performed by: FAMILY MEDICINE

## 2025-08-04 RX ORDER — VALSARTAN 80 MG/1
80 TABLET ORAL DAILY
Qty: 100 TABLET | Refills: 3 | Status: SHIPPED | OUTPATIENT
Start: 2025-08-04

## (undated) DEVICE — ELECTROSURGICAL DEVICE HOLSTER;FOR USE WITH MAXIMUM PEAK VOLTAGE OF 4000 V: Brand: FORCE TRIVERSE

## (undated) DEVICE — UNDER BUTTOCKS DRAPE W/FLUID CONTROL POUCH: Brand: CONVERTORS

## (undated) DEVICE — SYRINGE 3ML LL

## (undated) DEVICE — TUBING SUCTION 5MM X 12 FT

## (undated) DEVICE — SUT PDS II 2-0 CT-2 27 IN Z333H

## (undated) DEVICE — NEEDLE SPINAL 22G X 7IN QUINCKE

## (undated) DEVICE — CATH FOLEY 18FR 5ML 2 WAY UNCOATED SILICONE

## (undated) DEVICE — PACKING VAGINAL 2 IN

## (undated) DEVICE — EXIDINE 4 PCT

## (undated) DEVICE — INTENT TO BE USED WITH SUTURE MATERIAL FOR TISSUE CLOSURE: Brand: RICHARD-ALLAN® NEEDLE 1/2 CIRCLE TAPER

## (undated) DEVICE — SMOKE EVACUATION TUBING WITH 8 IN INTEGRAL WAND AND SPONGE GUARD: Brand: BUFFALO FILTER

## (undated) DEVICE — GLOVE PI ULTRA TOUCH SZ.8.0

## (undated) DEVICE — IV FLUSH NSS 10ML POSIFLUSH

## (undated) DEVICE — DECANTER: Brand: UNBRANDED

## (undated) DEVICE — ALLENTOWN DR  LUCENTE S LAP PK: Brand: CARDINAL HEALTH

## (undated) DEVICE — PREMIUM DRY TRAY LF: Brand: MEDLINE INDUSTRIES, INC.

## (undated) DEVICE — 2000CC GUARDIAN II: Brand: GUARDIAN

## (undated) DEVICE — CAUTERY TIP POLISHER: Brand: DEVON

## (undated) DEVICE — INTENDED FOR TISSUE SEPARATION, AND OTHER PROCEDURES THAT REQUIRE A SHARP SURGICAL BLADE TO PUNCTURE OR CUT.: Brand: BARD-PARKER SAFETY BLADES SIZE 11, STERILE

## (undated) DEVICE — SCD SEQUENTIAL COMPRESSION COMFORT SLEEVE MEDIUM KNEE LENGTH: Brand: KENDALL SCD

## (undated) DEVICE — BULB SYRINGE,IRRIGATION WITH PROTECTIVE CAP: Brand: DOVER

## (undated) DEVICE — SUT VICRYL 2-0 SH 27 IN UNDYED J417H

## (undated) DEVICE — NEEDLE HYPO 22G X 1-1/2 IN

## (undated) DEVICE — MEDI-VAC YANKAUER SUCTION HANDLE W/BULBOUS AND CONTROL VENT: Brand: CARDINAL HEALTH

## (undated) DEVICE — SPONGE CHERRY 1/2IN